# Patient Record
Sex: FEMALE | Race: WHITE | NOT HISPANIC OR LATINO | Employment: UNEMPLOYED | URBAN - NONMETROPOLITAN AREA
[De-identification: names, ages, dates, MRNs, and addresses within clinical notes are randomized per-mention and may not be internally consistent; named-entity substitution may affect disease eponyms.]

---

## 2017-06-22 PROCEDURE — 99284 EMERGENCY DEPT VISIT MOD MDM: CPT

## 2017-06-23 ENCOUNTER — APPOINTMENT (OUTPATIENT)
Dept: GENERAL RADIOLOGY | Facility: HOSPITAL | Age: 57
End: 2017-06-23

## 2017-06-23 ENCOUNTER — HOSPITAL ENCOUNTER (EMERGENCY)
Facility: HOSPITAL | Age: 57
Discharge: HOME | End: 2017-06-23
Attending: FAMILY MEDICINE | Admitting: FAMILY MEDICINE

## 2017-06-23 VITALS
HEIGHT: 60 IN | TEMPERATURE: 98.6 F | HEART RATE: 80 BPM | OXYGEN SATURATION: 94 % | BODY MASS INDEX: 43.19 KG/M2 | RESPIRATION RATE: 20 BRPM | SYSTOLIC BLOOD PRESSURE: 127 MMHG | WEIGHT: 220 LBS | DIASTOLIC BLOOD PRESSURE: 69 MMHG

## 2017-06-23 DIAGNOSIS — R19.7 DIARRHEA, UNSPECIFIED TYPE: ICD-10-CM

## 2017-06-23 DIAGNOSIS — R11.2 NON-INTRACTABLE VOMITING WITH NAUSEA, UNSPECIFIED VOMITING TYPE: Primary | ICD-10-CM

## 2017-06-23 DIAGNOSIS — E86.0 DEHYDRATION: ICD-10-CM

## 2017-06-23 DIAGNOSIS — N39.0 URINARY TRACT INFECTION, SITE UNSPECIFIED: ICD-10-CM

## 2017-06-23 LAB
ALBUMIN SERPL-MCNC: 4.6 G/DL (ref 3.4–4.8)
ALBUMIN/GLOB SERPL: 1.5 G/DL (ref 1.1–1.8)
ALP SERPL-CCNC: 71 U/L (ref 38–126)
ALT SERPL W P-5'-P-CCNC: 40 U/L (ref 9–52)
AMYLASE SERPL-CCNC: 41 U/L (ref 50–130)
ANION GAP SERPL CALCULATED.3IONS-SCNC: 18 MMOL/L (ref 5–15)
AST SERPL-CCNC: 24 U/L (ref 14–36)
BACTERIA UR QL AUTO: ABNORMAL /HPF
BASOPHILS # BLD AUTO: 0.01 10*3/MM3 (ref 0–0.2)
BASOPHILS NFR BLD AUTO: 0.1 % (ref 0–2)
BILIRUB SERPL-MCNC: 1.7 MG/DL (ref 0.2–1.3)
BILIRUB UR QL STRIP: ABNORMAL
BUN BLD-MCNC: 14 MG/DL (ref 7–21)
BUN/CREAT SERPL: 14.3 (ref 7–25)
CALCIUM SPEC-SCNC: 8.2 MG/DL (ref 8.4–10.2)
CHLORIDE SERPL-SCNC: 103 MMOL/L (ref 95–110)
CK SERPL-CCNC: 59 U/L (ref 30–135)
CLARITY UR: CLEAR
CO2 SERPL-SCNC: 21 MMOL/L (ref 22–31)
COLOR UR: YELLOW
CREAT BLD-MCNC: 0.98 MG/DL (ref 0.5–1)
DEPRECATED RDW RBC AUTO: 43.4 FL (ref 36.4–46.3)
EOSINOPHIL # BLD AUTO: 0.1 10*3/MM3 (ref 0–0.7)
EOSINOPHIL NFR BLD AUTO: 1.1 % (ref 0–7)
ERYTHROCYTE [DISTWIDTH] IN BLOOD BY AUTOMATED COUNT: 13 % (ref 11.5–14.5)
GFR SERPL CREATININE-BSD FRML MDRD: 58 ML/MIN/1.73 (ref 51–120)
GLOBULIN UR ELPH-MCNC: 3.1 GM/DL (ref 2.3–3.5)
GLUCOSE BLD-MCNC: 105 MG/DL (ref 60–100)
GLUCOSE UR STRIP-MCNC: NEGATIVE MG/DL
HCT VFR BLD AUTO: 46.5 % (ref 35–45)
HGB BLD-MCNC: 15.9 G/DL (ref 12–15.5)
HGB UR QL STRIP.AUTO: NEGATIVE
HYALINE CASTS UR QL AUTO: ABNORMAL /LPF
IMM GRANULOCYTES # BLD: 0.03 10*3/MM3 (ref 0–0.02)
IMM GRANULOCYTES NFR BLD: 0.3 % (ref 0–0.5)
KETONES UR QL STRIP: NEGATIVE
LEUKOCYTE ESTERASE UR QL STRIP.AUTO: ABNORMAL
LIPASE SERPL-CCNC: 51 U/L (ref 23–300)
LYMPHOCYTES # BLD AUTO: 2.01 10*3/MM3 (ref 0.6–4.2)
LYMPHOCYTES NFR BLD AUTO: 22.2 % (ref 10–50)
MCH RBC QN AUTO: 31.5 PG (ref 26.5–34)
MCHC RBC AUTO-ENTMCNC: 34.2 G/DL (ref 31.4–36)
MCV RBC AUTO: 92.1 FL (ref 80–98)
MONOCYTES # BLD AUTO: 0.62 10*3/MM3 (ref 0–0.9)
MONOCYTES NFR BLD AUTO: 6.8 % (ref 0–12)
NEUTROPHILS # BLD AUTO: 6.29 10*3/MM3 (ref 2–8.6)
NEUTROPHILS NFR BLD AUTO: 69.5 % (ref 37–80)
NITRITE UR QL STRIP: NEGATIVE
PH UR STRIP.AUTO: <=5 [PH] (ref 5–9)
PLATELET # BLD AUTO: 234 10*3/MM3 (ref 150–450)
PMV BLD AUTO: 10.7 FL (ref 8–12)
POTASSIUM BLD-SCNC: 3.8 MMOL/L (ref 3.5–5.1)
PROT SERPL-MCNC: 7.7 G/DL (ref 6.3–8.6)
PROT UR QL STRIP: NEGATIVE
RBC # BLD AUTO: 5.05 10*6/MM3 (ref 3.77–5.16)
RBC # UR: ABNORMAL /HPF
REF LAB TEST METHOD: ABNORMAL
SODIUM BLD-SCNC: 142 MMOL/L (ref 137–145)
SP GR UR STRIP: 1.03 (ref 1–1.03)
SQUAMOUS #/AREA URNS HPF: ABNORMAL /HPF
UROBILINOGEN UR QL STRIP: ABNORMAL
WBC NRBC COR # BLD: 9.06 10*3/MM3 (ref 3.2–9.8)
WBC UR QL AUTO: ABNORMAL /HPF

## 2017-06-23 PROCEDURE — 25010000002 KETOROLAC TROMETHAMINE PER 15 MG: Performed by: FAMILY MEDICINE

## 2017-06-23 PROCEDURE — 82150 ASSAY OF AMYLASE: CPT | Performed by: FAMILY MEDICINE

## 2017-06-23 PROCEDURE — 82550 ASSAY OF CK (CPK): CPT | Performed by: FAMILY MEDICINE

## 2017-06-23 PROCEDURE — 25010000002 ONDANSETRON PER 1 MG: Performed by: FAMILY MEDICINE

## 2017-06-23 PROCEDURE — 74022 RADEX COMPL AQT ABD SERIES: CPT

## 2017-06-23 PROCEDURE — 96374 THER/PROPH/DIAG INJ IV PUSH: CPT

## 2017-06-23 PROCEDURE — 81001 URINALYSIS AUTO W/SCOPE: CPT | Performed by: FAMILY MEDICINE

## 2017-06-23 PROCEDURE — 96361 HYDRATE IV INFUSION ADD-ON: CPT

## 2017-06-23 PROCEDURE — 85025 COMPLETE CBC W/AUTO DIFF WBC: CPT | Performed by: FAMILY MEDICINE

## 2017-06-23 PROCEDURE — 87086 URINE CULTURE/COLONY COUNT: CPT | Performed by: FAMILY MEDICINE

## 2017-06-23 PROCEDURE — 96375 TX/PRO/DX INJ NEW DRUG ADDON: CPT

## 2017-06-23 PROCEDURE — 80053 COMPREHEN METABOLIC PANEL: CPT | Performed by: FAMILY MEDICINE

## 2017-06-23 PROCEDURE — 83690 ASSAY OF LIPASE: CPT | Performed by: FAMILY MEDICINE

## 2017-06-23 RX ORDER — MELOXICAM 15 MG/1
15 TABLET ORAL DAILY
COMMUNITY
End: 2017-10-25 | Stop reason: SDUPTHER

## 2017-06-23 RX ORDER — ONDANSETRON 4 MG/1
4 TABLET, FILM COATED ORAL EVERY 8 HOURS PRN
Qty: 10 TABLET | Refills: 0 | Status: SHIPPED | OUTPATIENT
Start: 2017-06-23 | End: 2019-06-18 | Stop reason: SDUPTHER

## 2017-06-23 RX ORDER — KETOROLAC TROMETHAMINE 30 MG/ML
30 INJECTION, SOLUTION INTRAMUSCULAR; INTRAVENOUS ONCE
Status: COMPLETED | OUTPATIENT
Start: 2017-06-23 | End: 2017-06-23

## 2017-06-23 RX ORDER — CEPHALEXIN 500 MG/1
500 CAPSULE ORAL 4 TIMES DAILY
Qty: 40 CAPSULE | Refills: 0 | Status: SHIPPED | OUTPATIENT
Start: 2017-06-23 | End: 2017-09-25

## 2017-06-23 RX ORDER — ONDANSETRON 2 MG/ML
4 INJECTION INTRAMUSCULAR; INTRAVENOUS ONCE
Status: COMPLETED | OUTPATIENT
Start: 2017-06-23 | End: 2017-06-23

## 2017-06-23 RX ORDER — SODIUM CHLORIDE 9 MG/ML
125 INJECTION, SOLUTION INTRAVENOUS CONTINUOUS
Status: DISCONTINUED | OUTPATIENT
Start: 2017-06-23 | End: 2017-06-23 | Stop reason: HOSPADM

## 2017-06-23 RX ORDER — LISINOPRIL 5 MG/1
5 TABLET ORAL DAILY
COMMUNITY
End: 2017-09-25 | Stop reason: SDUPTHER

## 2017-06-23 RX ORDER — CYCLOBENZAPRINE HCL 10 MG
10 TABLET ORAL 3 TIMES DAILY PRN
COMMUNITY
End: 2017-09-25 | Stop reason: SDUPTHER

## 2017-06-23 RX ORDER — CEPHALEXIN 500 MG/1
500 CAPSULE ORAL ONCE
Status: COMPLETED | OUTPATIENT
Start: 2017-06-23 | End: 2017-06-23

## 2017-06-23 RX ORDER — LEVOTHYROXINE SODIUM 175 UG/1
175 TABLET ORAL DAILY
COMMUNITY
End: 2017-09-25 | Stop reason: SDUPTHER

## 2017-06-23 RX ADMIN — CEPHALEXIN 500 MG: 500 CAPSULE ORAL at 03:27

## 2017-06-23 RX ADMIN — KETOROLAC TROMETHAMINE 30 MG: 30 INJECTION, SOLUTION INTRAMUSCULAR; INTRAVENOUS at 01:28

## 2017-06-23 RX ADMIN — SODIUM CHLORIDE 1000 ML: 9 INJECTION, SOLUTION INTRAVENOUS at 01:27

## 2017-06-23 RX ADMIN — ONDANSETRON 4 MG: 2 INJECTION INTRAMUSCULAR; INTRAVENOUS at 01:28

## 2017-06-23 NOTE — ED TRIAGE NOTES
Pt became tearful at triage and states she lost her baby (28 yr old) not too long ago and has not been doing well since.

## 2017-06-23 NOTE — ED PROVIDER NOTES
Subjective   Patient is a 57 y.o. female presenting with vomiting.   Vomiting   The primary symptoms include fever, abdominal pain, nausea, vomiting, diarrhea, dysuria and myalgias. Primary symptoms do not include fatigue or rash. The illness began yesterday.   The dysuria is not associated with frequency or urgency.   The myalgias are not associated with weakness.   The illness is also significant for chills. The illness does not include constipation.       Review of Systems   Constitutional: Positive for chills and fever. Negative for appetite change, diaphoresis and fatigue.   HENT: Positive for ear pain. Negative for congestion, ear discharge, nosebleeds, rhinorrhea, sinus pressure, sore throat and trouble swallowing.    Eyes: Negative for discharge and redness.   Respiratory: Negative for apnea, cough, chest tightness, shortness of breath and wheezing.    Cardiovascular: Negative for chest pain.   Gastrointestinal: Positive for abdominal pain, diarrhea, nausea and vomiting. Negative for constipation.   Endocrine: Negative for polyuria.   Genitourinary: Positive for dysuria. Negative for frequency and urgency.   Musculoskeletal: Positive for myalgias. Negative for neck pain.   Skin: Negative for color change and rash.   Allergic/Immunologic: Negative for immunocompromised state.   Neurological: Negative for dizziness, seizures, syncope, weakness, light-headedness and headaches.   Hematological: Negative for adenopathy. Does not bruise/bleed easily.   Psychiatric/Behavioral: Negative for behavioral problems and confusion.   All other systems reviewed and are negative.      Past Medical History:   Diagnosis Date   • Allergy to bee sting    • Essential (primary) hypertension    • Hashimoto's thyroiditis    • Hypertensive left ventricular hypertrophy    • Osteoporosis    • Primary hyperparathyroidism     s/p parathyroidectomy      • Vitamin D deficiency        Allergies   Allergen Reactions   • Claritin [Loratadine]     • Sulfa Antibiotics        Past Surgical History:   Procedure Laterality Date   • BLADDER SURGERY  2004   • CHOLECYSTECTOMY  2002   • DILATATION AND CURETTAGE  1986   • HYSTERECTOMY  2004   • PARATHYROID GLAND SURGERY     • TUBAL ABDOMINAL LIGATION  1988       Family History   Problem Relation Age of Onset   • Diabetes Mother    • Heart disease Mother    • Heart disease Father    • Heart disease Brother    • Cancer Other    • Breast cancer Other    • Hypertension Other    • Lung cancer Other    • Lung disease Other        Social History     Social History   • Marital status:      Spouse name: N/A   • Number of children: N/A   • Years of education: N/A     Social History Main Topics   • Smoking status: Never Smoker   • Smokeless tobacco: Never Used   • Alcohol use No   • Drug use: Defer   • Sexual activity: Defer     Other Topics Concern   • None     Social History Narrative   • None           Objective   Physical Exam   Constitutional: She is oriented to person, place, and time. She appears well-developed and well-nourished.   HENT:   Head: Normocephalic and atraumatic.   Nose: Nose normal.   Mouth/Throat: Oropharynx is clear and moist. Mucous membranes are dry.   Eyes: Conjunctivae and EOM are normal. Pupils are equal, round, and reactive to light. Right eye exhibits no discharge. Left eye exhibits no discharge. No scleral icterus.   Neck: Normal range of motion. Neck supple. No tracheal deviation present.   Cardiovascular: Regular rhythm and normal heart sounds.  Tachycardia present.    No murmur heard.  Pulmonary/Chest: Effort normal and breath sounds normal. No stridor. No respiratory distress. She has no wheezes. She has no rales.   Abdominal: Soft. Bowel sounds are normal. She exhibits no distension and no mass. There is tenderness in the suprapubic area. There is CVA tenderness (right). There is no rebound and no guarding.   Musculoskeletal: She exhibits no edema.   Neurological: She is alert and  oriented to person, place, and time. Coordination normal.   Skin: Skin is warm and dry. No rash noted. No erythema.   Psychiatric: She has a normal mood and affect. Her behavior is normal. Thought content normal.   Nursing note and vitals reviewed.      Procedures         ED Course  ED Course          Labs Reviewed   COMPREHENSIVE METABOLIC PANEL - Abnormal; Notable for the following:        Result Value    Glucose 105 (*)     CO2 21.0 (*)     Calcium 8.2 (*)     Total Bilirubin 1.7 (*)     Anion Gap 18.0 (*)     All other components within normal limits   URINALYSIS W/ CULTURE IF INDICATED - Abnormal; Notable for the following:     Bilirubin, UA Small (1+) (*)     Leuk Esterase, UA Trace (*)     All other components within normal limits   AMYLASE - Abnormal; Notable for the following:     Amylase 41 (*)     All other components within normal limits   CBC WITH AUTO DIFFERENTIAL - Abnormal; Notable for the following:     Hemoglobin 15.9 (*)     Hematocrit 46.5 (*)     Immature Grans, Absolute 0.03 (*)     All other components within normal limits   URINALYSIS, MICROSCOPIC ONLY - Abnormal; Notable for the following:     RBC, UA 0-2 (*)     WBC, UA 6-12 (*)     Bacteria, UA 1+ (*)     Squamous Epithelial Cells, UA 3-5 (*)     All other components within normal limits   CK - Normal   LIPASE - Normal   URINE CULTURE   CBC AND DIFFERENTIAL    Narrative:     The following orders were created for panel order CBC & Differential.  Procedure                               Abnormality         Status                     ---------                               -----------         ------                     CBC Auto Differential[[id number]]        Abnormal            Final result                 Please view results for these tests on the individual orders.       XR Abdomen 2 View With Chest 1 View   Final Result   1. No acute pulmonary disease.   2. Nonspecific abdomen.      Electronically signed by:  Miguel Angel Card  6/23/2017  2:11 AM   CDT Workstation: [id number]                    [facility] [place]    Final diagnoses:   Non-intractable vomiting with nausea, unspecified vomiting type   Diarrhea, unspecified type   Urinary tract infection, site unspecified   Dehydration            Efrain Schultz MD  06/23/17 0056       Efrain Schultz MD  06/23/17 0257

## 2017-06-24 LAB — BACTERIA SPEC AEROBE CULT: NORMAL

## 2017-06-29 ENCOUNTER — TELEPHONE (OUTPATIENT)
Dept: FAMILY MEDICINE CLINIC | Facility: CLINIC | Age: 57
End: 2017-06-29

## 2017-06-29 NOTE — TELEPHONE ENCOUNTER
----- Message from Missy Amaroele Tacho sent at 6/27/2017  2:56 PM CDT -----  Regarding: ER F/U-APPT DECLINED  Contact: [phone]  PT SAYS SHE IS FEELING MUCH BETTER, NO APPT NECESSARY.

## 2017-08-19 ENCOUNTER — HOSPITAL ENCOUNTER (OUTPATIENT)
Dept: CT IMAGING | Facility: HOSPITAL | Age: 57
Discharge: HOME | End: 2017-08-19
Admitting: FAMILY MEDICINE

## 2017-08-19 PROCEDURE — 83690 ASSAY OF LIPASE: CPT | Performed by: FAMILY MEDICINE

## 2017-08-19 PROCEDURE — 80053 COMPREHEN METABOLIC PANEL: CPT | Performed by: FAMILY MEDICINE

## 2017-08-19 PROCEDURE — 85025 COMPLETE CBC W/AUTO DIFF WBC: CPT | Performed by: FAMILY MEDICINE

## 2017-08-19 PROCEDURE — 0 IOPAMIDOL 61 % SOLUTION: Performed by: FAMILY MEDICINE

## 2017-08-19 PROCEDURE — 82150 ASSAY OF AMYLASE: CPT | Performed by: FAMILY MEDICINE

## 2017-08-19 PROCEDURE — 74177 CT ABD & PELVIS W/CONTRAST: CPT

## 2017-08-19 RX ADMIN — IOPAMIDOL 95 ML: 612 INJECTION, SOLUTION INTRAVENOUS at 14:14

## 2017-09-25 ENCOUNTER — OFFICE VISIT (OUTPATIENT)
Dept: FAMILY MEDICINE CLINIC | Facility: CLINIC | Age: 57
End: 2017-09-25

## 2017-09-25 VITALS
BODY MASS INDEX: 44.57 KG/M2 | DIASTOLIC BLOOD PRESSURE: 110 MMHG | OXYGEN SATURATION: 98 % | SYSTOLIC BLOOD PRESSURE: 140 MMHG | WEIGHT: 227 LBS | HEART RATE: 112 BPM | HEIGHT: 60 IN

## 2017-09-25 DIAGNOSIS — I10 ESSENTIAL HYPERTENSION: ICD-10-CM

## 2017-09-25 DIAGNOSIS — E03.8 HYPOTHYROIDISM DUE TO HASHIMOTO'S THYROIDITIS: ICD-10-CM

## 2017-09-25 DIAGNOSIS — F43.21 GRIEF REACTION: ICD-10-CM
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
## 2017-09-25 DIAGNOSIS — E06.3 HYPOTHYROIDISM DUE TO HASHIMOTO'S THYROIDITIS: ICD-10-CM

## 2017-09-25 DIAGNOSIS — M25.532 LEFT WRIST PAIN: Primary | ICD-10-CM
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
## 2017-09-25 PROCEDURE — 99213 OFFICE O/P EST LOW 20 MIN: CPT | Performed by: FAMILY MEDICINE

## 2017-09-25 RX ORDER — CYCLOBENZAPRINE HCL 10 MG
10 TABLET ORAL 3 TIMES DAILY PRN
Qty: 30 TABLET | Refills: 2 | Status: SHIPPED | OUTPATIENT
Start: 2017-09-25 | End: 2018-08-20 | Stop reason: SDUPTHER

## 2017-09-25 RX ORDER — LEVOTHYROXINE SODIUM 175 UG/1
175 TABLET ORAL DAILY
Qty: 30 TABLET | Refills: 2 | Status: SHIPPED | OUTPATIENT
Start: 2017-09-25 | End: 2017-12-07 | Stop reason: SDUPTHER

## 2017-09-25 RX ORDER — LISINOPRIL 5 MG/1
5 TABLET ORAL DAILY
Qty: 30 TABLET | Refills: 2 | Status: SHIPPED | OUTPATIENT
Start: 2017-09-25 | End: 2017-12-07

## 2017-09-25 NOTE — PROGRESS NOTES
Subjective   Argenis Blair is a 57 y.o. female. She complains of left wrist pain after a fall 10 days ago. She was seen at the ED in [place]. She was told she had no fracture at that time but that she needed a repeat xray in 10 days. She has been taking her Zestril irregularly due to concerns about taking too much medicine. She has not taken her Synthroid in several months and needs a refill. She is tearful when describing her son's gunshot wound related death.     Arm Pain    The incident occurred more than 1 week ago. The incident occurred at home. The injury mechanism was a fall. The pain is present in the left forearm and left wrist. The pain does not radiate. The pain is at a severity of 6/10. The pain is moderate. The pain has been constant since the incident. Pertinent negatives include no chest pain, muscle weakness, numbness or tingling. Nothing aggravates the symptoms. She has tried NSAIDs for the symptoms. The treatment provided mild relief.   Hypothyroidism   This is a chronic problem. The current episode started more than 1 year ago. The problem occurs constantly. The problem has been unchanged. Associated symptoms include arthralgias, joint swelling and myalgias. Pertinent negatives include no abdominal pain, chest pain, coughing, fatigue, fever, headaches, nausea, numbness, rash, sore throat or weakness. Nothing aggravates the symptoms. The treatment provided mild relief.        The following portions of the patient's history were reviewed and updated as appropriate: allergies, current medications, past family history, past medical history, past social history, past surgical history and problem list.    Review of Systems   Constitutional: Negative for activity change, appetite change, fatigue and fever.   HENT: Negative for ear pain and sore throat.    Eyes: Negative for pain and visual disturbance.   Respiratory: Negative for cough and shortness of breath.    Cardiovascular: Negative for chest  pain and palpitations.   Gastrointestinal: Negative for abdominal pain and nausea.   Endocrine: Negative for cold intolerance and heat intolerance.   Genitourinary: Negative for difficulty urinating and dysuria.   Musculoskeletal: Positive for arthralgias, joint swelling and myalgias. Negative for gait problem.   Skin: Negative for color change and rash.   Neurological: Negative for dizziness, tingling, weakness, numbness and headaches.   Hematological: Negative for adenopathy. Does not bruise/bleed easily.   Psychiatric/Behavioral: Negative for agitation, confusion and sleep disturbance.       Objective   Physical Exam   Constitutional: She is oriented to person, place, and time. She appears well-developed and well-nourished.   HENT:   Head: Normocephalic and atraumatic.   Right Ear: External ear normal.   Left Ear: External ear normal.   Nose: Nose normal.   Mouth/Throat: Oropharynx is clear and moist.   Eyes: Conjunctivae and EOM are normal.   Neck: Normal range of motion. Neck supple.   Cardiovascular: Normal rate, regular rhythm and normal heart sounds.    Pulmonary/Chest: Effort normal and breath sounds normal.   Abdominal: Soft. Bowel sounds are normal. She exhibits no distension. There is no tenderness.   Musculoskeletal:        Left wrist: She exhibits decreased range of motion, tenderness and swelling.        Arms:  Immobilized and splinted left wrist and hand. Fingers are neurovascularly intact.   Neurological: She is alert and oriented to person, place, and time.   Skin: Skin is warm and dry.   Psychiatric: Her behavior is normal. Her mood appears anxious. Her speech is rapid and/or pressured. Cognition and memory are normal.       Assessment/Plan   Argenis was seen today for establish care, wrist pain and leg pain.    Diagnoses and all orders for this visit:    Left wrist pain  -     XR Forearm 2 View Left (In Office)    Hypothyroidism due to Hashimoto's thyroiditis  -     levothyroxine (SYNTHROID,  LEVOTHROID) 175 MCG tablet; Take 1 tablet by mouth Daily.    Fall, subsequent encounter  -     cyclobenzaprine (FLEXERIL) 10 MG tablet; Take 1 tablet by mouth 3 (Three) Times a Day As Needed for Muscle Spasms.    Essential hypertension  -     lisinopril (PRINIVIL,ZESTRIL) 5 MG tablet; Take 1 tablet by mouth Daily.    Grief reaction      Patient declined counseling and/or antidepressant therapy for grief related to son's death.    Will call with xray results.    Follow up in one month to recheck and monitor and check T4 and TSH.          This document has been electronically signed by Enoc Lange DO on September 25, 2017 3:53 PM

## 2017-09-28 ENCOUNTER — TELEPHONE (OUTPATIENT)
Dept: FAMILY MEDICINE CLINIC | Facility: CLINIC | Age: 57
End: 2017-09-28

## 2017-09-29 DIAGNOSIS — M25.532 LEFT WRIST PAIN: Primary | ICD-10-CM

## 2017-09-29 NOTE — TELEPHONE ENCOUNTER
MRI left wrist ordered to evaluate continued left wrist pain after fall. Plain films are normal.  Please coordinate with patient.    Thanks.    JEFF

## 2017-10-17 ENCOUNTER — APPOINTMENT (OUTPATIENT)
Dept: MRI IMAGING | Facility: HOSPITAL | Age: 57
End: 2017-10-17
Attending: FAMILY MEDICINE

## 2017-10-25 ENCOUNTER — LAB (OUTPATIENT)
Dept: LAB | Facility: HOSPITAL | Age: 57
End: 2017-10-25

## 2017-10-25 ENCOUNTER — OFFICE VISIT (OUTPATIENT)
Dept: FAMILY MEDICINE CLINIC | Facility: CLINIC | Age: 57
End: 2017-10-25

## 2017-10-25 VITALS
DIASTOLIC BLOOD PRESSURE: 90 MMHG | OXYGEN SATURATION: 99 % | WEIGHT: 222 LBS | HEIGHT: 60 IN | SYSTOLIC BLOOD PRESSURE: 142 MMHG | BODY MASS INDEX: 43.59 KG/M2 | HEART RATE: 104 BPM

## 2017-10-25 DIAGNOSIS — E03.8 HYPOTHYROIDISM DUE TO HASHIMOTO'S THYROIDITIS: ICD-10-CM

## 2017-10-25 DIAGNOSIS — K92.1 HEMATOCHEZIA: ICD-10-CM

## 2017-10-25 DIAGNOSIS — K21.00 REFLUX ESOPHAGITIS: ICD-10-CM

## 2017-10-25 DIAGNOSIS — B00.1 FEVER BLISTER: ICD-10-CM

## 2017-10-25 DIAGNOSIS — E06.3 HYPOTHYROIDISM DUE TO HASHIMOTO'S THYROIDITIS: ICD-10-CM

## 2017-10-25 DIAGNOSIS — K29.00 ACUTE SUPERFICIAL GASTRITIS WITHOUT HEMORRHAGE: ICD-10-CM

## 2017-10-25 DIAGNOSIS — F32.9 REACTIVE DEPRESSION: ICD-10-CM

## 2017-10-25 DIAGNOSIS — M25.532 LEFT WRIST PAIN: Primary | ICD-10-CM

## 2017-10-25 PROBLEM — K29.70 GASTRITIS: Status: ACTIVE | Noted: 2017-10-25

## 2017-10-25 PROBLEM — I10 ESSENTIAL HYPERTENSION: Status: RESOLVED | Noted: 2017-09-25 | Resolved: 2017-10-25

## 2017-10-25 PROBLEM — E03.9 ACQUIRED HYPOTHYROIDISM: Status: ACTIVE | Noted: 2017-10-25

## 2017-10-25 PROBLEM — K59.1 FUNCTIONAL DIARRHEA: Status: ACTIVE | Noted: 2017-10-25

## 2017-10-25 LAB
T4 FREE SERPL-MCNC: 1.65 NG/DL (ref 0.78–2.19)
TSH SERPL DL<=0.05 MIU/L-ACNC: 0.48 MIU/ML (ref 0.46–4.68)

## 2017-10-25 PROCEDURE — 36415 COLL VENOUS BLD VENIPUNCTURE: CPT

## 2017-10-25 PROCEDURE — 84443 ASSAY THYROID STIM HORMONE: CPT | Performed by: FAMILY MEDICINE

## 2017-10-25 PROCEDURE — 99214 OFFICE O/P EST MOD 30 MIN: CPT | Performed by: FAMILY MEDICINE

## 2017-10-25 PROCEDURE — 84439 ASSAY OF FREE THYROXINE: CPT | Performed by: FAMILY MEDICINE

## 2017-10-25 RX ORDER — MIRTAZAPINE 15 MG/1
15 TABLET, ORALLY DISINTEGRATING ORAL NIGHTLY
Qty: 30 TABLET | Refills: 2 | Status: SHIPPED | OUTPATIENT
Start: 2017-10-25 | End: 2017-11-09

## 2017-10-25 RX ORDER — MELOXICAM 15 MG/1
15 TABLET ORAL DAILY
Qty: 30 TABLET | Refills: 5 | Status: SHIPPED | OUTPATIENT
Start: 2017-10-25 | End: 2017-10-25 | Stop reason: SDUPTHER

## 2017-10-25 RX ORDER — PANTOPRAZOLE SODIUM 40 MG/1
40 TABLET, DELAYED RELEASE ORAL DAILY
Qty: 30 TABLET | Refills: 2 | Status: SHIPPED | OUTPATIENT
Start: 2017-10-25 | End: 2018-01-25

## 2017-10-25 RX ORDER — FAMCICLOVIR 500 MG/1
500 TABLET ORAL 3 TIMES DAILY
Qty: 21 TABLET | Refills: 2 | Status: SHIPPED | OUTPATIENT
Start: 2017-10-25 | End: 2019-02-14 | Stop reason: SDUPTHER

## 2017-10-25 RX ORDER — MELOXICAM 15 MG/1
15 TABLET ORAL DAILY
Qty: 30 TABLET | Refills: 5 | Status: SHIPPED | OUTPATIENT
Start: 2017-10-25 | End: 2018-11-12 | Stop reason: SDUPTHER

## 2017-10-25 NOTE — PATIENT INSTRUCTIONS
Major Depressive Disorder  Major depressive disorder is a mental illness. It also may be called clinical depression or unipolar depression. Major depressive disorder usually causes feelings of sadness, hopelessness, or helplessness. Some people with this disorder do not feel particularly sad but lose interest in doing things they used to enjoy (anhedonia). Major depressive disorder also can cause physical symptoms. It can interfere with work, school, relationships, and other normal everyday activities. The disorder varies in severity but is longer lasting and more serious than the sadness we all feel from time to time in our lives.  Major depressive disorder often is triggered by stressful life events or major life changes. Examples of these triggers include divorce, loss of your job or home, a move, and the death of a family member or close friend. Sometimes this disorder occurs for no obvious reason at all. People who have family members with major depressive disorder or bipolar disorder are at higher risk for developing this disorder, with or without life stressors. Major depressive disorder can occur at any age. It may occur just once in your life (single episode major depressive disorder). It may occur multiple times (recurrent major depressive disorder).  SYMPTOMS  People with major depressive disorder have either anhedonia or depressed mood on nearly a daily basis for at least 2 weeks or longer. Symptoms of depressed mood include:  · Feelings of sadness (blue or down in the dumps) or emptiness.  · Feelings of hopelessness or helplessness.  · Tearfulness or episodes of crying (may be observed by others).  · Irritability (children and adolescents).  In addition to depressed mood or anhedonia or both, people with this disorder have at least four of the following symptoms:  · Difficulty sleeping or sleeping too much.    · Significant change (increase or decrease) in appetite or weight.    · Lack of energy or  motivation.  · Feelings of guilt and worthlessness.    · Difficulty concentrating, remembering, or making decisions.  · Unusually slow movement (psychomotor retardation) or restlessness (as observed by others).    · Recurrent wishes for death, recurrent thoughts of self-harm (suicide), or a suicide attempt.  People with major depressive disorder commonly have persistent negative thoughts about themselves, other people, and the world. People with severe major depressive disorder may experience distorted beliefs or perceptions about the world (psychotic delusions). They also may see or hear things that are not real (psychotic hallucinations).  DIAGNOSIS  Major depressive disorder is diagnosed through an assessment by your health care provider. Your health care provider will ask about aspects of your daily life, such as mood, sleep, and appetite, to see if you have the diagnostic symptoms of major depressive disorder. Your health care provider may ask about your medical history and use of alcohol or drugs, including prescription medicines. Your health care provider also may do a physical exam and blood work. This is because certain medical conditions and the use of certain substances can cause major depressive disorder-like symptoms (secondary depression). Your health care provider also may refer you to a mental health specialist for further evaluation and treatment.  TREATMENT  It is important to recognize the symptoms of major depressive disorder and seek treatment. The following treatments can be prescribed for this disorder:    · Medicine. Antidepressant medicines usually are prescribed. Antidepressant medicines are thought to correct chemical imbalances in the brain that are commonly associated with major depressive disorder. Other types of medicine may be added if the symptoms do not respond to antidepressant medicines alone or if psychotic delusions or hallucinations occur.  · Talk therapy. Talk therapy can be  helpful in treating major depressive disorder by providing support, education, and guidance. Certain types of talk therapy also can help with negative thinking (cognitive behavioral therapy) and with relationship issues that trigger this disorder (interpersonal therapy).  A mental health specialist can help determine which treatment is best for you. Most people with major depressive disorder do well with a combination of medicine and talk therapy. Treatments involving electrical stimulation of the brain can be used in situations with extremely severe symptoms or when medicine and talk therapy do not work over time. These treatments include electroconvulsive therapy, transcranial magnetic stimulation, and vagal nerve stimulation.     This information is not intended to replace advice given to you by your health care provider. Make sure you discuss any questions you have with your health care provider.     Document Released: 04/14/2014 Document Revised: 01/08/2016 Document Reviewed: 04/14/2014  [employer] Interactive Patient Education ©2017 [employer]

## 2017-10-25 NOTE — PROGRESS NOTES
Subjective   Argenis Blair is a 57 y.o. female. She continues to have left wrist pain and has a fever blister. She suffers from hypothyroidism and needs labs. She has multiple psychosocial problems with deaths in the family. She is taking care of her grandchild. Her son  in a gun related accident and there is some confusion as to whether it was an accident or homicide. The matter is under investigation. She reports hematochezia and wants to see GI. She has a fever blister on her upper lip and complains of heartburn as well.  Depression   Visit Type: initial  Onset of symptoms: more than 1 year ago  Patient presents with the following symptoms: anhedonia, decreased concentration, depressed mood, fatigue, feelings of hopelessness, feelings of worthlessness, insomnia and nervousness/anxiety.  Patient is not experiencing: confusion, palpitations, shortness of breath, suicidal ideas, suicidal planning, thoughts of death and weight gain.  Severity: moderate   Aggravated by: family issues  Sleep quality: poor  Nighttime awakenings: none  Risk factors: family history and major life event  Patient has a history of: depression    Wrist Pain    The pain is present in the left wrist. This is a chronic problem. The current episode started more than 1 month ago. There has been a history of trauma. The problem occurs intermittently. The problem has been gradually improving. The pain is at a severity of 5/10. The pain is moderate. Associated symptoms include a limited range of motion. Pertinent negatives include no fever. The symptoms are aggravated by activity. She has tried NSAIDS for the symptoms. The treatment provided moderate relief.   GI Problem   The primary symptoms include fatigue, abdominal pain, hematochezia, myalgias and arthralgias. Primary symptoms do not include fever, nausea, dysuria or rash. The illness began more than 7 days ago. The problem has not changed since onset.  The arthralgias are associated with  limited range of motion.     The myalgias are not associated with weakness.   Significant associated medical issues include GERD.   Heartburn   She complains of abdominal pain. She reports no chest pain, no coughing, no nausea or no sore throat. This is a new problem. The current episode started 1 to 4 weeks ago. The problem occurs frequently. The problem has been unchanged. Nothing aggravates the symptoms. Associated symptoms include fatigue. There are no known risk factors. She has tried nothing for the symptoms. The treatment provided no relief.        The following portions of the patient's history were reviewed and updated as appropriate: allergies, current medications, past family history, past medical history, past social history, past surgical history and problem list.    Review of Systems   Constitutional: Positive for activity change, appetite change and fatigue. Negative for fever and weight gain.   HENT: Negative for ear pain and sore throat.    Eyes: Negative for pain and visual disturbance.   Respiratory: Negative for cough and shortness of breath.    Cardiovascular: Negative for chest pain and palpitations.   Gastrointestinal: Positive for abdominal pain and hematochezia. Negative for nausea.        Hematochezia   Endocrine: Negative for cold intolerance and heat intolerance.   Genitourinary: Negative for difficulty urinating and dysuria.   Musculoskeletal: Positive for arthralgias and myalgias. Negative for gait problem.   Skin: Negative for color change and rash.   Neurological: Negative for dizziness, weakness and headaches.   Hematological: Negative for adenopathy. Does not bruise/bleed easily.   Psychiatric/Behavioral: Positive for decreased concentration, dysphoric mood and sleep disturbance. Negative for agitation, confusion, self-injury and suicidal ideas. The patient is nervous/anxious and has insomnia.        Objective   Physical Exam   Constitutional: She is oriented to person, place, and  time. She appears well-developed and well-nourished.   HENT:   Head: Normocephalic and atraumatic.       Right Ear: External ear normal.   Left Ear: External ear normal.   Nose: Nose normal.   Mouth/Throat: Oropharynx is clear and moist.   Fever blister to right upper lip.   Eyes: Conjunctivae and EOM are normal.   Neck: Normal range of motion. Neck supple.   Cardiovascular: Normal rate, regular rhythm and normal heart sounds.    Pulmonary/Chest: Effort normal and breath sounds normal.   Abdominal: Soft. Bowel sounds are normal. She exhibits no distension. There is no tenderness.   Musculoskeletal:        Left wrist: She exhibits decreased range of motion, tenderness and bony tenderness. She exhibits no swelling, no effusion, no crepitus, no deformity and no laceration.   Neurological: She is alert and oriented to person, place, and time.   Skin: Skin is warm and dry.   Psychiatric: Judgment and thought content normal. Her mood appears anxious. Her speech is rapid and/or pressured. She is agitated. Cognition and memory are normal.     PROCEDURE: XR FOREARM 2 VW LEFT     VIEWS: 2     INDICATION: Pain     COMPARISON: None     FINDINGS:     - fracture: None    - alignment: Within normal limits    - misc: No significant soft tissue abnormality identified     IMPRESSION:  CONCLUSION:  Negative examination for age.       Assessment/Plan   Argenis was seen today for shoulder pain, hypertension and med refill.    Diagnoses and all orders for this visit:    Left wrist pain  -     meloxicam (MOBIC) 15 MG tablet; Take 1 tablet by mouth Daily.    Fever blister  -     famciclovir (FAMVIR) 500 MG tablet; Take 1 tablet by mouth 3 (Three) Times a Day.    Hypothyroidism due to Hashimoto's thyroiditis  -     TSH; Future  -     T4, free; Future    Reactive depression  -     mirtazapine (REMERON SOLTAB) 15 MG disintegrating tablet; Take 1 tablet by mouth Every Night.    Reflux esophagitis  -     pantoprazole (PROTONIX) 40 MG EC tablet;  Take 1 tablet by mouth Daily.  -     POCT FECAL OCCULT BLOOD BY IMMUNOASSAY    Acute superficial gastritis without hemorrhage    Hematochezia  -     Ambulatory Referral to Gastroenterology    Other orders  -     Discontinue: meloxicam (MOBIC) 15 MG tablet; Take 1 tablet by mouth Daily.      Follow up in 2 weeks to address other medical issues.          This document has been electronically signed by Enoc Lange DO on October 26, 2017 10:15 AM

## 2017-10-26 ENCOUNTER — RESULTS ENCOUNTER (OUTPATIENT)
Dept: FAMILY MEDICINE CLINIC | Facility: CLINIC | Age: 57
End: 2017-10-26

## 2017-10-26 DIAGNOSIS — K92.1 HEMATOCHEZIA: ICD-10-CM

## 2017-11-09 ENCOUNTER — OFFICE VISIT (OUTPATIENT)
Dept: FAMILY MEDICINE CLINIC | Facility: CLINIC | Age: 57
End: 2017-11-09

## 2017-11-09 VITALS
WEIGHT: 219 LBS | HEIGHT: 60 IN | DIASTOLIC BLOOD PRESSURE: 80 MMHG | HEART RATE: 120 BPM | SYSTOLIC BLOOD PRESSURE: 122 MMHG | BODY MASS INDEX: 43 KG/M2 | OXYGEN SATURATION: 98 %

## 2017-11-09 DIAGNOSIS — I10 ESSENTIAL HYPERTENSION: ICD-10-CM

## 2017-11-09 DIAGNOSIS — F43.21 GRIEF REACTION: ICD-10-CM

## 2017-11-09 DIAGNOSIS — F32.9 REACTIVE DEPRESSION: Primary | ICD-10-CM

## 2017-11-09 PROBLEM — F51.01 PRIMARY INSOMNIA: Status: ACTIVE | Noted: 2017-11-09

## 2017-11-09 PROCEDURE — 90472 IMMUNIZATION ADMIN EACH ADD: CPT | Performed by: FAMILY MEDICINE

## 2017-11-09 PROCEDURE — 99213 OFFICE O/P EST LOW 20 MIN: CPT | Performed by: FAMILY MEDICINE

## 2017-11-09 PROCEDURE — 90715 TDAP VACCINE 7 YRS/> IM: CPT | Performed by: FAMILY MEDICINE

## 2017-11-09 PROCEDURE — 90686 IIV4 VACC NO PRSV 0.5 ML IM: CPT | Performed by: FAMILY MEDICINE

## 2017-11-09 PROCEDURE — 90471 IMMUNIZATION ADMIN: CPT | Performed by: FAMILY MEDICINE

## 2017-11-09 RX ORDER — BUPROPION HYDROCHLORIDE 150 MG/1
150 TABLET, EXTENDED RELEASE ORAL 2 TIMES DAILY
Qty: 60 TABLET | Refills: 2 | Status: SHIPPED | OUTPATIENT
Start: 2017-11-09 | End: 2017-12-07 | Stop reason: SDUPTHER

## 2017-11-09 NOTE — PATIENT INSTRUCTIONS
Major Depressive Disorder  Major depressive disorder is a mental illness. It also may be called clinical depression or unipolar depression. Major depressive disorder usually causes feelings of sadness, hopelessness, or helplessness. Some people with this disorder do not feel particularly sad but lose interest in doing things they used to enjoy (anhedonia). Major depressive disorder also can cause physical symptoms. It can interfere with work, school, relationships, and other normal everyday activities. The disorder varies in severity but is longer lasting and more serious than the sadness we all feel from time to time in our lives.  Major depressive disorder often is triggered by stressful life events or major life changes. Examples of these triggers include divorce, loss of your job or home, a move, and the death of a family member or close friend. Sometimes this disorder occurs for no obvious reason at all. People who have family members with major depressive disorder or bipolar disorder are at higher risk for developing this disorder, with or without life stressors. Major depressive disorder can occur at any age. It may occur just once in your life (single episode major depressive disorder). It may occur multiple times (recurrent major depressive disorder).  SYMPTOMS  People with major depressive disorder have either anhedonia or depressed mood on nearly a daily basis for at least 2 weeks or longer. Symptoms of depressed mood include:  · Feelings of sadness (blue or down in the dumps) or emptiness.  · Feelings of hopelessness or helplessness.  · Tearfulness or episodes of crying (may be observed by others).  · Irritability (children and adolescents).  In addition to depressed mood or anhedonia or both, people with this disorder have at least four of the following symptoms:  · Difficulty sleeping or sleeping too much.    · Significant change (increase or decrease) in appetite or weight.    · Lack of energy or  motivation.  · Feelings of guilt and worthlessness.    · Difficulty concentrating, remembering, or making decisions.  · Unusually slow movement (psychomotor retardation) or restlessness (as observed by others).    · Recurrent wishes for death, recurrent thoughts of self-harm (suicide), or a suicide attempt.  People with major depressive disorder commonly have persistent negative thoughts about themselves, other people, and the world. People with severe major depressive disorder may experience distorted beliefs or perceptions about the world (psychotic delusions). They also may see or hear things that are not real (psychotic hallucinations).  DIAGNOSIS  Major depressive disorder is diagnosed through an assessment by your health care provider. Your health care provider will ask about aspects of your daily life, such as mood, sleep, and appetite, to see if you have the diagnostic symptoms of major depressive disorder. Your health care provider may ask about your medical history and use of alcohol or drugs, including prescription medicines. Your health care provider also may do a physical exam and blood work. This is because certain medical conditions and the use of certain substances can cause major depressive disorder-like symptoms (secondary depression). Your health care provider also may refer you to a mental health specialist for further evaluation and treatment.  TREATMENT  It is important to recognize the symptoms of major depressive disorder and seek treatment. The following treatments can be prescribed for this disorder:    · Medicine. Antidepressant medicines usually are prescribed. Antidepressant medicines are thought to correct chemical imbalances in the brain that are commonly associated with major depressive disorder. Other types of medicine may be added if the symptoms do not respond to antidepressant medicines alone or if psychotic delusions or hallucinations occur.  · Talk therapy. Talk therapy can be  helpful in treating major depressive disorder by providing support, education, and guidance. Certain types of talk therapy also can help with negative thinking (cognitive behavioral therapy) and with relationship issues that trigger this disorder (interpersonal therapy).  A mental health specialist can help determine which treatment is best for you. Most people with major depressive disorder do well with a combination of medicine and talk therapy. Treatments involving electrical stimulation of the brain can be used in situations with extremely severe symptoms or when medicine and talk therapy do not work over time. These treatments include electroconvulsive therapy, transcranial magnetic stimulation, and vagal nerve stimulation.     This information is not intended to replace advice given to you by your health care provider. Make sure you discuss any questions you have with your health care provider.     Document Released: 04/14/2014 Document Revised: 01/08/2016 Document Reviewed: 04/14/2014  [employer] Interactive Patient Education ©2017 [employer]

## 2017-11-09 NOTE — PROGRESS NOTES
Subjective   Argenis Blair is a 57 y.o. female. She is here for a follow up for depression. She could not tolerate Remeron because it made her feel bad.    Depression   Visit Type: follow-up  Patient presents with the following symptoms: anhedonia, decreased concentration, depressed mood, excessive worry, feelings of hopelessness, insomnia, nervousness/anxiety and restlessness.  Patient is not experiencing: confusion, palpitations, shortness of breath, suicidal ideas, suicidal planning, thoughts of death and weight gain.  Frequency of symptoms: constantly   Severity: severe   Sleep quality: poor  Nighttime awakenings: several  Compliance with medications:  51-75%  Side effects:  Dry mouth       The following portions of the patient's history were reviewed and updated as appropriate: allergies, current medications, past family history, past medical history, past social history, past surgical history and problem list.    Review of Systems   Constitutional: Negative for activity change, appetite change, fatigue, fever and weight gain.   HENT: Negative for ear pain and sore throat.    Eyes: Negative for pain and visual disturbance.   Respiratory: Negative for cough and shortness of breath.    Cardiovascular: Negative for chest pain and palpitations.   Gastrointestinal: Negative for abdominal pain and nausea.   Endocrine: Negative for cold intolerance and heat intolerance.   Genitourinary: Negative for difficulty urinating and dysuria.   Musculoskeletal: Negative for arthralgias and gait problem.   Skin: Negative for color change and rash.   Neurological: Negative for dizziness, weakness and headaches.   Hematological: Negative for adenopathy. Does not bruise/bleed easily.   Psychiatric/Behavioral: Positive for agitation, decreased concentration, dysphoric mood and sleep disturbance. Negative for confusion, hallucinations, self-injury and suicidal ideas. The patient is nervous/anxious and has insomnia. The patient is  not hyperactive.        Objective   Physical Exam   Constitutional: She is oriented to person, place, and time. She appears well-developed and well-nourished.   HENT:   Head: Normocephalic and atraumatic.   Right Ear: External ear normal.   Left Ear: External ear normal.   Nose: Nose normal.   Mouth/Throat: Oropharynx is clear and moist.   Eyes: Conjunctivae and EOM are normal.   Neck: Normal range of motion. Neck supple.   Cardiovascular: Normal rate, regular rhythm and normal heart sounds.    Pulmonary/Chest: Effort normal and breath sounds normal.   Abdominal: Soft. Bowel sounds are normal. She exhibits no distension. There is no tenderness.   Musculoskeletal: Normal range of motion.   Neurological: She is alert and oriented to person, place, and time.   Skin: Skin is warm and dry.   Psychiatric: Her mood appears anxious. Her speech is rapid and/or pressured. She is agitated. She is not withdrawn. Thought content is not delusional. Cognition and memory are normal. Cognition and memory are not impaired. She does not express impulsivity. She expresses no homicidal and no suicidal ideation. She expresses no suicidal plans and no homicidal plans. She exhibits normal recent memory and normal remote memory. She is inattentive.       Assessment/Plan   Argenis was seen today for fibromyalgia and wrist pain.    Diagnoses and all orders for this visit:    Reactive depression  -     buPROPion SR (WELLBUTRIN SR) 150 MG 12 hr tablet; Take 1 tablet by mouth 2 (Two) Times a Day.    Grief reaction    Essential hypertension    Other orders  -     Tdap Vaccine Greater Than or Equal To 8yo IM  -     Flu Vaccine Quad PF 3YR+ (FLUARIX [id number])    Patient declined my efforts to refer her to behavioral health for grief counseling    Discontinue Remeron.    Start Wellbutrin 150 mg po twice daily    Follow up in 1 month          This document has been electronically signed by Enoc Lange DO on November 9, 2017 4:40 PM

## 2017-11-30 ENCOUNTER — OFFICE VISIT (OUTPATIENT)
Dept: GASTROENTEROLOGY | Facility: CLINIC | Age: 57
End: 2017-11-30

## 2017-11-30 VITALS
DIASTOLIC BLOOD PRESSURE: 88 MMHG | SYSTOLIC BLOOD PRESSURE: 136 MMHG | WEIGHT: 223.8 LBS | HEIGHT: 60 IN | BODY MASS INDEX: 43.94 KG/M2 | HEART RATE: 106 BPM

## 2017-11-30 DIAGNOSIS — R19.4 CHANGE IN BOWEL HABITS: ICD-10-CM

## 2017-11-30 DIAGNOSIS — K21.9 GASTROESOPHAGEAL REFLUX DISEASE, ESOPHAGITIS PRESENCE NOT SPECIFIED: ICD-10-CM

## 2017-11-30 DIAGNOSIS — R11.0 NAUSEA: Primary | ICD-10-CM

## 2017-11-30 DIAGNOSIS — K59.00 CONSTIPATION, UNSPECIFIED CONSTIPATION TYPE: ICD-10-CM

## 2017-11-30 DIAGNOSIS — K62.5 HEMORRHAGE OF ANUS AND RECTUM: ICD-10-CM

## 2017-11-30 PROCEDURE — 99214 OFFICE O/P EST MOD 30 MIN: CPT | Performed by: PHYSICIAN ASSISTANT

## 2017-11-30 RX ORDER — SODIUM, POTASSIUM,MAG SULFATES 17.5-3.13G
1 SOLUTION, RECONSTITUTED, ORAL ORAL ONCE
Qty: 1 BOTTLE | Refills: 0 | Status: SHIPPED | OUTPATIENT
Start: 2017-11-30 | End: 2017-11-30

## 2017-11-30 RX ORDER — DEXTROSE AND SODIUM CHLORIDE 5; .45 G/100ML; G/100ML
30 INJECTION, SOLUTION INTRAVENOUS CONTINUOUS PRN
Status: CANCELLED | OUTPATIENT
Start: 2018-01-19

## 2017-12-07 ENCOUNTER — APPOINTMENT (OUTPATIENT)
Dept: LAB | Facility: HOSPITAL | Age: 57
End: 2017-12-07

## 2017-12-07 ENCOUNTER — OFFICE VISIT (OUTPATIENT)
Dept: FAMILY MEDICINE CLINIC | Facility: CLINIC | Age: 57
End: 2017-12-07

## 2017-12-07 VITALS
HEART RATE: 120 BPM | WEIGHT: 220 LBS | DIASTOLIC BLOOD PRESSURE: 110 MMHG | SYSTOLIC BLOOD PRESSURE: 158 MMHG | BODY MASS INDEX: 43.19 KG/M2 | OXYGEN SATURATION: 98 % | HEIGHT: 60 IN

## 2017-12-07 DIAGNOSIS — I10 ESSENTIAL HYPERTENSION: ICD-10-CM

## 2017-12-07 DIAGNOSIS — E03.8 HYPOTHYROIDISM DUE TO HASHIMOTO'S THYROIDITIS: ICD-10-CM

## 2017-12-07 DIAGNOSIS — F32.9 REACTIVE DEPRESSION: Primary | ICD-10-CM

## 2017-12-07 DIAGNOSIS — E06.3 HYPOTHYROIDISM DUE TO HASHIMOTO'S THYROIDITIS: ICD-10-CM

## 2017-12-07 LAB
ALBUMIN SERPL-MCNC: 4.7 G/DL (ref 3.4–4.8)
ALP SERPL-CCNC: 70 U/L (ref 38–126)
ALT SERPL W P-5'-P-CCNC: 33 U/L (ref 9–52)
AST SERPL-CCNC: 50 U/L (ref 14–36)
BILIRUB CONJ SERPL-MCNC: 0 MG/DL (ref 0–0.3)
BILIRUB INDIRECT SERPL-MCNC: 0.6 MG/DL (ref 0–1.1)
BILIRUB SERPL-MCNC: 1.2 MG/DL (ref 0.2–1.3)
INR PPP: 0.88 (ref 0.8–1.2)
PROT SERPL-MCNC: 8.1 G/DL (ref 6.3–8.6)
PROTHROMBIN TIME: 11.8 SECONDS (ref 11.1–15.3)

## 2017-12-07 PROCEDURE — 82247 BILIRUBIN TOTAL: CPT | Performed by: PHYSICIAN ASSISTANT

## 2017-12-07 PROCEDURE — 83883 ASSAY NEPHELOMETRY NOT SPEC: CPT | Performed by: PHYSICIAN ASSISTANT

## 2017-12-07 PROCEDURE — 82947 ASSAY GLUCOSE BLOOD QUANT: CPT | Performed by: PHYSICIAN ASSISTANT

## 2017-12-07 PROCEDURE — 84450 TRANSFERASE (AST) (SGOT): CPT | Performed by: PHYSICIAN ASSISTANT

## 2017-12-07 PROCEDURE — 83010 ASSAY OF HAPTOGLOBIN QUANT: CPT | Performed by: PHYSICIAN ASSISTANT

## 2017-12-07 PROCEDURE — 80074 ACUTE HEPATITIS PANEL: CPT | Performed by: PHYSICIAN ASSISTANT

## 2017-12-07 PROCEDURE — 82977 ASSAY OF GGT: CPT | Performed by: PHYSICIAN ASSISTANT

## 2017-12-07 PROCEDURE — 84478 ASSAY OF TRIGLYCERIDES: CPT | Performed by: PHYSICIAN ASSISTANT

## 2017-12-07 PROCEDURE — 84460 ALANINE AMINO (ALT) (SGPT): CPT | Performed by: PHYSICIAN ASSISTANT

## 2017-12-07 PROCEDURE — 82465 ASSAY BLD/SERUM CHOLESTEROL: CPT | Performed by: PHYSICIAN ASSISTANT

## 2017-12-07 PROCEDURE — 85610 PROTHROMBIN TIME: CPT | Performed by: PHYSICIAN ASSISTANT

## 2017-12-07 PROCEDURE — 80076 HEPATIC FUNCTION PANEL: CPT | Performed by: PHYSICIAN ASSISTANT

## 2017-12-07 PROCEDURE — 99214 OFFICE O/P EST MOD 30 MIN: CPT | Performed by: FAMILY MEDICINE

## 2017-12-07 PROCEDURE — 36415 COLL VENOUS BLD VENIPUNCTURE: CPT | Performed by: PHYSICIAN ASSISTANT

## 2017-12-07 RX ORDER — LISINOPRIL AND HYDROCHLOROTHIAZIDE 12.5; 1 MG/1; MG/1
1 TABLET ORAL DAILY
Qty: 90 TABLET | Refills: 2 | Status: SHIPPED | OUTPATIENT
Start: 2017-12-07 | End: 2018-07-03

## 2017-12-07 RX ORDER — BUPROPION HYDROCHLORIDE 150 MG/1
TABLET, EXTENDED RELEASE ORAL
Qty: 90 TABLET | Refills: 3 | Status: SHIPPED | OUTPATIENT
Start: 2017-12-07 | End: 2018-07-03

## 2017-12-07 NOTE — PROGRESS NOTES
Subjective   Argenis Blair is a 57 y.o. female. She is still very upset over her son who  in an accident 9 months ago. She is going to counseling at her [employer] but is still not sleeping well. Her blood pressure has been up and she cries a great deal.    Depression   Visit Type: follow-up  Patient presents with the following symptoms: anhedonia, decreased concentration, depressed mood, dizziness, excessive worry, insomnia, muscle tension and nervousness/anxiety.  Patient is not experiencing: confusion, palpitations, shortness of breath, suicidal ideas, suicidal planning, thoughts of death, weight gain and weight loss.  Frequency of symptoms: constantly   Severity: severe   Sleep quality: poor  Nighttime awakenings: occasional  Compliance with medications:  %             The following portions of the patient's history were reviewed and updated as appropriate: allergies, current medications, past family history, past medical history, past social history, past surgical history and problem list.    Review of Systems   Constitutional: Negative for activity change, appetite change, fatigue, fever, weight gain and weight loss.   HENT: Negative for ear pain and sore throat.    Eyes: Negative for pain and visual disturbance.   Respiratory: Negative for cough and shortness of breath.    Cardiovascular: Negative for chest pain and palpitations.   Gastrointestinal: Negative for abdominal pain and nausea.   Endocrine: Negative for cold intolerance and heat intolerance.   Genitourinary: Negative for difficulty urinating and dysuria.   Musculoskeletal: Negative for arthralgias and gait problem.   Skin: Negative for color change and rash.   Neurological: Negative for dizziness, weakness and headaches.   Hematological: Negative for adenopathy. Does not bruise/bleed easily.   Psychiatric/Behavioral: Positive for decreased concentration, dysphoric mood and sleep disturbance. Negative for agitation, confusion, self-injury  and suicidal ideas. The patient is nervous/anxious and has insomnia.        Objective   Physical Exam   Constitutional: She is oriented to person, place, and time. Vital signs are normal. She appears well-developed and well-nourished.   HENT:   Head: Normocephalic and atraumatic.   Right Ear: External ear normal.   Left Ear: External ear normal.   Nose: Nose normal.   Mouth/Throat: Oropharynx is clear and moist.   Eyes: Conjunctivae and EOM are normal.   Neck: Normal range of motion. Neck supple.   Cardiovascular: Normal rate, regular rhythm and normal heart sounds.    Pulmonary/Chest: Effort normal and breath sounds normal.   Abdominal: Soft. Bowel sounds are normal. She exhibits no distension. There is no tenderness.   Musculoskeletal: Normal range of motion.   Neurological: She is alert and oriented to person, place, and time.   Skin: Skin is warm and dry.   Psychiatric: Judgment normal. Her mood appears anxious. Her speech is rapid and/or pressured. She is agitated. Cognition and memory are normal.   tearful       Assessment/Plan   Argenis was seen today for depression and fibromyalgia.    Diagnoses and all orders for this visit:    Reactive depression  -     buPROPion SR (WELLBUTRIN SR) 150 MG 12 hr tablet; 1 by mouth three times daily    Essential hypertension  -     lisinopril-hydrochlorothiazide (ZESTORETIC) 10-12.5 MG per tablet; Take 1 tablet by mouth Daily.      Follow up in one month.          This document has been electronically signed by Enoc Lange DO on December 7, 2017 4:37 PM

## 2017-12-07 NOTE — PATIENT INSTRUCTIONS

## 2017-12-08 LAB
HAV IGM SERPL QL IA: NEGATIVE
HBV CORE IGM SERPL QL IA: NEGATIVE
HBV SURFACE AG SERPL QL IA: NEGATIVE
HCV AB SER DONR QL: NEGATIVE

## 2017-12-08 RX ORDER — LEVOTHYROXINE SODIUM 175 UG/1
TABLET ORAL
Qty: 30 TABLET | Refills: 2 | Status: SHIPPED | OUTPATIENT
Start: 2017-12-08 | End: 2018-04-09 | Stop reason: SDUPTHER

## 2017-12-12 LAB
A2 MACROGLOB SERPL-MCNC: 221 MG/DL (ref 110–276)
ALT SERPL W P-5'-P-CCNC: 26 IU/L (ref 0–40)
APO A-I SERPL-MCNC: 162 MG/DL (ref 116–209)
AST SERPL W P-5'-P-CCNC: 35 IU/L (ref 0–40)
BILIRUB SERPL-MCNC: 0.6 MG/DL (ref 0–1.2)
CHOLEST SERPL-MCNC: 201 MG/DL (ref 100–199)
FIBROSIS SCORING:: ABNORMAL
FIBROSIS STAGE SERPL QL: ABNORMAL
GGT SERPL-CCNC: 25 IU/L (ref 0–60)
GLUCOSE SERPL-MCNC: 99 MG/DL (ref 65–99)
HAPTOGLOB SERPL-MCNC: 223 MG/DL (ref 34–200)
INTERPRETATIONS: (REFERENCE): ABNORMAL
LABORATORY COMMENT REPORT: ABNORMAL
LIMITATIONS: (REFERENCE): ABNORMAL
LIVER FIBR SCORE SERPL CALC.FIBROSURE: 0.17 (ref 0–0.21)
NASH GRADE (REFERENCE): ABNORMAL
NASH SCORE (REFERENCE): 0.5
NASH SCORING (REFERENCE): ABNORMAL
STEATOSIS GRADE (REFERENCE): ABNORMAL
STEATOSIS GRADING (REFERENCE): ABNORMAL
STEATOSIS SCORE (REFERENCE): 0.59 (ref 0–0.3)
TRIGL SERPL-MCNC: 227 MG/DL (ref 0–149)
WEIGHT: (REFERENCE): 220 LBS

## 2018-01-05 ENCOUNTER — OFFICE VISIT (OUTPATIENT)
Dept: FAMILY MEDICINE CLINIC | Facility: CLINIC | Age: 58
End: 2018-01-05

## 2018-01-05 VITALS
WEIGHT: 220 LBS | DIASTOLIC BLOOD PRESSURE: 80 MMHG | OXYGEN SATURATION: 98 % | SYSTOLIC BLOOD PRESSURE: 122 MMHG | BODY MASS INDEX: 43.19 KG/M2 | HEART RATE: 108 BPM | HEIGHT: 60 IN

## 2018-01-05 DIAGNOSIS — Z71.89 COUNSELING FOR BEREAVEMENT: ICD-10-CM

## 2018-01-05 DIAGNOSIS — F43.21 GRIEF REACTION: Primary | ICD-10-CM

## 2018-01-05 DIAGNOSIS — F51.01 PRIMARY INSOMNIA: ICD-10-CM

## 2018-01-05 DIAGNOSIS — Z63.9 FAMILY PROBLEMS: ICD-10-CM

## 2018-01-05 DIAGNOSIS — I10 ESSENTIAL HYPERTENSION: ICD-10-CM

## 2018-01-05 DIAGNOSIS — F32.9 REACTIVE DEPRESSION: ICD-10-CM

## 2018-01-05 PROCEDURE — 99214 OFFICE O/P EST MOD 30 MIN: CPT | Performed by: FAMILY MEDICINE

## 2018-01-05 SDOH — SOCIAL STABILITY - SOCIAL INSECURITY: PROBLEM RELATED TO PRIMARY SUPPORT GROUP, UNSPECIFIED: Z63.9

## 2018-01-05 NOTE — PATIENT INSTRUCTIONS
Major Depressive Disorder  Major depressive disorder is a mental illness. It also may be called clinical depression or unipolar depression. Major depressive disorder usually causes feelings of sadness, hopelessness, or helplessness. Some people with this disorder do not feel particularly sad but lose interest in doing things they used to enjoy (anhedonia). Major depressive disorder also can cause physical symptoms. It can interfere with work, school, relationships, and other normal everyday activities. The disorder varies in severity but is longer lasting and more serious than the sadness we all feel from time to time in our lives.  Major depressive disorder often is triggered by stressful life events or major life changes. Examples of these triggers include divorce, loss of your job or home, a move, and the death of a family member or close friend. Sometimes this disorder occurs for no obvious reason at all. People who have family members with major depressive disorder or bipolar disorder are at higher risk for developing this disorder, with or without life stressors. Major depressive disorder can occur at any age. It may occur just once in your life (single episode major depressive disorder). It may occur multiple times (recurrent major depressive disorder).  SYMPTOMS  People with major depressive disorder have either anhedonia or depressed mood on nearly a daily basis for at least 2 weeks or longer. Symptoms of depressed mood include:  · Feelings of sadness (blue or down in the dumps) or emptiness.  · Feelings of hopelessness or helplessness.  · Tearfulness or episodes of crying (may be observed by others).  · Irritability (children and adolescents).  In addition to depressed mood or anhedonia or both, people with this disorder have at least four of the following symptoms:  · Difficulty sleeping or sleeping too much.    · Significant change (increase or decrease) in appetite or weight.    · Lack of energy or  motivation.  · Feelings of guilt and worthlessness.    · Difficulty concentrating, remembering, or making decisions.  · Unusually slow movement (psychomotor retardation) or restlessness (as observed by others).    · Recurrent wishes for death, recurrent thoughts of self-harm (suicide), or a suicide attempt.  People with major depressive disorder commonly have persistent negative thoughts about themselves, other people, and the world. People with severe major depressive disorder may experience distorted beliefs or perceptions about the world (psychotic delusions). They also may see or hear things that are not real (psychotic hallucinations).  DIAGNOSIS  Major depressive disorder is diagnosed through an assessment by your health care provider. Your health care provider will ask about aspects of your daily life, such as mood, sleep, and appetite, to see if you have the diagnostic symptoms of major depressive disorder. Your health care provider may ask about your medical history and use of alcohol or drugs, including prescription medicines. Your health care provider also may do a physical exam and blood work. This is because certain medical conditions and the use of certain substances can cause major depressive disorder-like symptoms (secondary depression). Your health care provider also may refer you to a mental health specialist for further evaluation and treatment.  TREATMENT  It is important to recognize the symptoms of major depressive disorder and seek treatment. The following treatments can be prescribed for this disorder:    · Medicine. Antidepressant medicines usually are prescribed. Antidepressant medicines are thought to correct chemical imbalances in the brain that are commonly associated with major depressive disorder. Other types of medicine may be added if the symptoms do not respond to antidepressant medicines alone or if psychotic delusions or hallucinations occur.  · Talk therapy. Talk therapy can be  helpful in treating major depressive disorder by providing support, education, and guidance. Certain types of talk therapy also can help with negative thinking (cognitive behavioral therapy) and with relationship issues that trigger this disorder (interpersonal therapy).  A mental health specialist can help determine which treatment is best for you. Most people with major depressive disorder do well with a combination of medicine and talk therapy. Treatments involving electrical stimulation of the brain can be used in situations with extremely severe symptoms or when medicine and talk therapy do not work over time. These treatments include electroconvulsive therapy, transcranial magnetic stimulation, and vagal nerve stimulation.     This information is not intended to replace advice given to you by your health care provider. Make sure you discuss any questions you have with your health care provider.     Document Released: 04/14/2014 Document Revised: 01/08/2016 Document Reviewed: 04/14/2014  [employer] Interactive Patient Education ©2017 [employer]
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
## 2018-01-19 ENCOUNTER — ANESTHESIA (OUTPATIENT)
Dept: GASTROENTEROLOGY | Facility: HOSPITAL | Age: 58
End: 2018-01-19

## 2018-01-19 ENCOUNTER — HOSPITAL ENCOUNTER (OUTPATIENT)
Facility: HOSPITAL | Age: 58
Setting detail: HOSPITAL OUTPATIENT SURGERY
Discharge: HOME | End: 2018-01-19
Attending: INTERNAL MEDICINE | Admitting: INTERNAL MEDICINE

## 2018-01-19 ENCOUNTER — ANESTHESIA EVENT (OUTPATIENT)
Dept: GASTROENTEROLOGY | Facility: HOSPITAL | Age: 58
End: 2018-01-19

## 2018-01-19 VITALS
SYSTOLIC BLOOD PRESSURE: 121 MMHG | RESPIRATION RATE: 18 BRPM | DIASTOLIC BLOOD PRESSURE: 70 MMHG | OXYGEN SATURATION: 99 % | BODY MASS INDEX: 42.85 KG/M2 | HEIGHT: 60 IN | HEART RATE: 90 BPM | TEMPERATURE: 97 F | WEIGHT: 218.26 LBS

## 2018-01-19 DIAGNOSIS — R11.0 NAUSEA: ICD-10-CM

## 2018-01-19 DIAGNOSIS — K62.5 HEMORRHAGE OF ANUS AND RECTUM: ICD-10-CM

## 2018-01-19 DIAGNOSIS — K21.9 GASTROESOPHAGEAL REFLUX DISEASE, ESOPHAGITIS PRESENCE NOT SPECIFIED: ICD-10-CM

## 2018-01-19 DIAGNOSIS — K59.00 CONSTIPATION, UNSPECIFIED CONSTIPATION TYPE: ICD-10-CM

## 2018-01-19 DIAGNOSIS — R19.4 CHANGE IN BOWEL HABITS: ICD-10-CM

## 2018-01-19 PROCEDURE — 43239 EGD BIOPSY SINGLE/MULTIPLE: CPT | Performed by: INTERNAL MEDICINE

## 2018-01-19 PROCEDURE — 45378 DIAGNOSTIC COLONOSCOPY: CPT | Performed by: INTERNAL MEDICINE

## 2018-01-19 PROCEDURE — 25010000002 PROPOFOL 10 MG/ML EMULSION: Performed by: NURSE ANESTHETIST, CERTIFIED REGISTERED

## 2018-01-19 PROCEDURE — 88305 TISSUE EXAM BY PATHOLOGIST: CPT | Performed by: PATHOLOGY

## 2018-01-19 PROCEDURE — 88305 TISSUE EXAM BY PATHOLOGIST: CPT | Performed by: INTERNAL MEDICINE

## 2018-01-19 RX ORDER — LIDOCAINE HYDROCHLORIDE 10 MG/ML
INJECTION, SOLUTION INFILTRATION; PERINEURAL AS NEEDED
Status: DISCONTINUED | OUTPATIENT
Start: 2018-01-19 | End: 2018-01-19 | Stop reason: SURG

## 2018-01-19 RX ORDER — PROPOFOL 10 MG/ML
VIAL (ML) INTRAVENOUS AS NEEDED
Status: DISCONTINUED | OUTPATIENT
Start: 2018-01-19 | End: 2018-01-19 | Stop reason: SURG

## 2018-01-19 RX ORDER — LISINOPRIL 5 MG/1
5 TABLET ORAL DAILY
COMMUNITY
End: 2018-07-03

## 2018-01-19 RX ORDER — ONDANSETRON 2 MG/ML
4 INJECTION INTRAMUSCULAR; INTRAVENOUS ONCE AS NEEDED
Status: DISCONTINUED | OUTPATIENT
Start: 2018-01-19 | End: 2018-01-19 | Stop reason: HOSPADM

## 2018-01-19 RX ORDER — PROMETHAZINE HYDROCHLORIDE 25 MG/ML
12.5 INJECTION, SOLUTION INTRAMUSCULAR; INTRAVENOUS ONCE AS NEEDED
Status: DISCONTINUED | OUTPATIENT
Start: 2018-01-19 | End: 2018-01-19 | Stop reason: HOSPADM

## 2018-01-19 RX ORDER — DEXTROSE AND SODIUM CHLORIDE 5; .45 G/100ML; G/100ML
20 INJECTION, SOLUTION INTRAVENOUS CONTINUOUS
Status: DISCONTINUED | OUTPATIENT
Start: 2018-01-19 | End: 2018-01-19 | Stop reason: HOSPADM

## 2018-01-19 RX ORDER — PROMETHAZINE HYDROCHLORIDE 25 MG/1
25 SUPPOSITORY RECTAL ONCE AS NEEDED
Status: DISCONTINUED | OUTPATIENT
Start: 2018-01-19 | End: 2018-01-19 | Stop reason: HOSPADM

## 2018-01-19 RX ORDER — PROMETHAZINE HYDROCHLORIDE 25 MG/1
25 TABLET ORAL ONCE AS NEEDED
Status: DISCONTINUED | OUTPATIENT
Start: 2018-01-19 | End: 2018-01-19 | Stop reason: HOSPADM

## 2018-01-19 RX ADMIN — LIDOCAINE HYDROCHLORIDE 100 MG: 10 INJECTION, SOLUTION INFILTRATION; PERINEURAL at 14:38

## 2018-01-19 RX ADMIN — PROPOFOL 100 MG: 10 INJECTION, EMULSION INTRAVENOUS at 14:40

## 2018-01-19 RX ADMIN — DEXTROSE AND SODIUM CHLORIDE 20 ML/HR: 5; 450 INJECTION, SOLUTION INTRAVENOUS at 14:22

## 2018-01-19 RX ADMIN — PROPOFOL 50 MG: 10 INJECTION, EMULSION INTRAVENOUS at 14:46

## 2018-01-19 RX ADMIN — PROPOFOL 100 MG: 10 INJECTION, EMULSION INTRAVENOUS at 14:38

## 2018-01-19 RX ADMIN — PROPOFOL 50 MG: 10 INJECTION, EMULSION INTRAVENOUS at 14:50

## 2018-01-19 RX ADMIN — PROPOFOL 50 MG: 10 INJECTION, EMULSION INTRAVENOUS at 14:43

## 2018-01-19 NOTE — ANESTHESIA POSTPROCEDURE EVALUATION
Patient: Argenis Blair    Procedure Summary     Date Anesthesia Start Anesthesia Stop Room / Location    01/19/18 1434 [id number] [facility] ENDOSCOPY 1 / [facility] ENDOSCOPY       Procedure Diagnosis Surgeon Provider    ESOPHAGOGASTRODUODENOSCOPY--before follow up (N/A Esophagus); COLONOSCOPY (N/A ) Nausea; Hemorrhage of anus and rectum; Change in bowel habits; Constipation, unspecified constipation type; Gastroesophageal reflux disease, esophagitis presence not specified  (Nausea [R11.0]; Hemorrhage of anus and rectum [K62.5]; Change in bowel habits [R19.4]; Constipation, unspecified constipation type [K59.00]; Gastroesophageal reflux disease, esophagitis presence not specified [K21.9]) MD Carmen Edge CRNA          Anesthesia Type: MAC  Last vitals  BP   146/82 (01/19/18 1409)   Temp   97 °F (36.1 °C) (01/19/18 1409)   Pulse   97 (01/19/18 1409)   Resp   16 (01/19/18 1409)     SpO2   98 % (01/19/18 1409)     Post Anesthesia Care and Evaluation    Patient location during evaluation: bedside  Patient participation: complete - patient participated  Level of consciousness: responsive to verbal stimuli  Pain management: adequate  Airway patency: patent  Anesthetic complications: No anesthetic complications    Cardiovascular status: acceptable  Respiratory status: acceptable  Hydration status: acceptable

## 2018-01-19 NOTE — H&P
Progress Notes  Encounter Date: 1/19/2018  Samson chavez  Gastroenterology   Expand All Collapse All    []Hide copied text  []Hover for attribution information  Chief Complaint   Patient presents with   • Hematochezia       Ref. Dr. Nazario GRAY PROCEDURE ORDERED: EGD/COLON, N/GERD, change bowel, blood stool        Subjective        Argenis Blair is a 57 y.o. female. she is being seen for consultation today at the request of Enoc Lange DO     History of Present Illness     This 57-year-old female lists no occupation was sent for consultation for blood in her stool and heartburn by Dr. Lange who saw the patient on 10/25/17.  TSH, T4 that date were normal.  Patient presents with 1 out of 10 abdominal pain today.  She claims to never get heartburn but is taking Zofran and Protonix regularly.  She has nausea at times.  But denied vomiting.  She does have dysphagia and states pills seem to hang in the back of her throat.  She's had increasing constipation and has been taking MiraLAX.  She has been more constipated for the past several months.  She seen bright red blood on the tissue paper and in the bowl.  She states normally she had diarrhea since cholecystectomy many years ago.  Her weight is been fairly stable.  She's never had endoscopy.     Acute abdominal series showed granulomas in the lung on 6/23/17.     Patient went to the ER on 8/19/17 showed normal amylase lipase, urinalysis.  CMP showed glucose 110, protein 5.2, total bilirubin 1.6, AST 50, ALT 54, otherwise normal.  CBC showed white count of 10.32, hemoglobin 17.2, hematocrit 49.8.  CT scan abdomen pelvis showed degeneration of lumbar spine, post-hysterectomy, cholecystectomy, diverticulosis of the colon with moderate stool.     Patient currently denies tobacco, alcohol, illicit substance use.  She is a previous half pack a day smoker off and on for 10 years.  She denied alcohol, illicit substance use.  She has a history of major  depressive disorder, DNC, tubal ligation, cholecystectomy, hysterectomy, bladder repair, parathyroid resection.  Family history diabetes, heart disease, ulcer, unknown cancer, gallstones, hypertension.  Father  age 73 with CHF.  Mother age 74 diabetes, A. fib, pacemaker.  Spouse age 57 in good health.  2 brothers still living with heart disease and arthritis.  One child living, one child  age 28 with gunshot wound.     A/P: Nausea, GERD, dysphagia at risk for Ardon's, possible peptic stricture.  Recommend EGD.  She is hoarse and describes more cervical dysphagia, would consider ENT evaluation.  Given her blood in her stool and her age, she has never had a colonoscopy, certainly at risk for occult colon lesion, recommend colonoscopy to evaluate.  Given her elevated liver enzymes suspect this is related to a fatty liver, recent imaging was okay in August of her liver.  Recommend Ledesma fibro-sure, hepatitis diagnostic panel, LFTs, INR.  Would consider further evaluation depending on the results of the above.  We'll see her in follow-up after the above, further pending clinical course and the results the above.     Thank you very much Dr. Lange for this consultation, and for allowing us to participate in care of your patient.  We'll keep you informed.      The following portions of the patient's history were reviewed and updated as appropriate:    Medical History         Past Medical History:   Diagnosis Date   • Allergy to bee sting     • Essential (primary) hypertension     • Hashimoto's thyroiditis     • Hypertensive left ventricular hypertrophy     • Osteoporosis     • Primary hyperparathyroidism       s/p parathyroidectomy      • Vitamin D deficiency            Surgical History          Past Surgical History:   Procedure Laterality Date   • BLADDER SURGERY      • CHOLECYSTECTOMY      • DILATATION AND CURETTAGE      • HYSTERECTOMY      • PARATHYROID GLAND SURGERY       • TUBAL ABDOMINAL  LIGATION   1988               Family History   Problem Relation Age of Onset   • Diabetes Mother     • Heart disease Mother     • Heart disease Father     • Heart disease Brother     • Cancer Other     • Breast cancer Other     • Hypertension Other     • Lung cancer Other     • Lung disease Other            OB History      No data available               Allergies   Allergen Reactions   • Percocet [Oxycodone-Acetaminophen] Delirium   • Claritin [Loratadine]     • Keflex [Cephalexin] Nausea And Vomiting   • Sulfa Antibiotics         Social History    Social History            Social History   • Marital status:        Spouse name: N/A   • Number of children: N/A   • Years of education: N/A           Social History Main Topics   • Smoking status: Never Smoker   • Smokeless tobacco: Never Used   • Alcohol use No   • Drug use: Defer   • Sexual activity: Defer           Other Topics Concern   • None      Social History Narrative            Current Outpatient Prescriptions:   •  cyclobenzaprine (FLEXERIL) 10 MG tablet, Take 1 tablet by mouth 3 (Three) Times a Day As Needed for Muscle Spasms., Disp: 30 tablet, Rfl: 2  •  famciclovir (FAMVIR) 500 MG tablet, Take 1 tablet by mouth 3 (Three) Times a Day., Disp: 21 tablet, Rfl: 2  •  magnesium oxide (MAGOX) 400 (241.3 MG) MG tablet tablet, Take 400 mg by mouth Daily., Disp: , Rfl:   •  meloxicam (MOBIC) 15 MG tablet, Take 1 tablet by mouth Daily., Disp: 30 tablet, Rfl: 5  •  ondansetron (ZOFRAN) 4 MG tablet, Take 1 tablet by mouth Every 8 (Eight) Hours As Needed for Nausea or Vomiting., Disp: 10 tablet, Rfl: 0  •  pantoprazole (PROTONIX) 40 MG EC tablet, Take 1 tablet by mouth Daily., Disp: 30 tablet, Rfl: 2  •  polyethylene glycol (MIRALAX) packet, Take 17 g by mouth Daily., Disp: 30 each, Rfl: 0  •  buPROPion SR (WELLBUTRIN SR) 150 MG 12 hr tablet, 1 by mouth three times daily, Disp: 90 tablet, Rfl: 3  •  levothyroxine (SYNTHROID, LEVOTHROID) 175 MCG tablet, TAKE ONE  "TABLET BY MOUTH ONCE DAILY, Disp: 30 tablet, Rfl: 2  •  lisinopril-hydrochlorothiazide (ZESTORETIC) 10-12.5 MG per tablet, Take 1 tablet by mouth Daily., Disp: 90 tablet, Rfl: 2  Review of Systems  Review of Systems   Constitutional: Negative for unexpected weight change.   HENT: Positive for trouble swallowing.    Cardiovascular: Negative for chest pain.   Gastrointestinal: Positive for abdominal pain, anal bleeding, blood in stool and constipation. Negative for abdominal distention, diarrhea, nausea, rectal pain and vomiting.   Genitourinary: Negative for difficulty urinating.   All other systems reviewed and are negative.                                 Objective        /88 (BP Location: Left arm)  Pulse 106  Ht 152.4 cm (60\")  Wt 102 kg (223 lb 12.8 oz)  BMI 43.71 kg/m2  Physical Exam   Constitutional: She is oriented to person, place, and time. She appears well-developed and well-nourished. No distress.   HENT:   Head: Normocephalic and atraumatic.   Eyes: EOM are normal. Pupils are equal, round, and reactive to light.   Neck: Normal range of motion.   Cardiovascular: Normal rate, regular rhythm and normal heart sounds.    Pulmonary/Chest: Effort normal and breath sounds normal.   Abdominal: Soft. Bowel sounds are normal. She exhibits no shifting dullness, no distension, no abdominal bruit, no ascites and no mass. There is no hepatosplenomegaly. There is tenderness. There is no rigidity, no rebound, no guarding and no CVA tenderness. No hernia. Hernia confirmed negative in the ventral area.   Obese, mild diffue   Musculoskeletal: Normal range of motion.   Neurological: She is alert and oriented to person, place, and time.   Skin: Skin is warm and dry.   Psychiatric: She has a normal mood and affect. Her behavior is normal. Judgment and thought content normal.   Nursing note and vitals reviewed.        Assessment/Plan           1. Nausea    2. Hemorrhage of anus and rectum    3. Gastroesophageal reflux " disease, esophagitis presence not specified    4. Constipation, unspecified constipation type    5. Change in bowel habits    .   Argenis was seen today for hematochezia.     Diagnoses and all orders for this visit:     Nausea  -     Hepatic Function Panel  -     Hepatitis Panel, Acute  -     VERDIN Fibrosure  -     Protime-INR  -     Case Request; Standing  -     dextrose 5 % and sodium chloride 0.45 % infusion; Infuse 30 mL/hr into a venous catheter Continuous As Needed (Start Prior to Procedure).  -     Case Request     Hemorrhage of anus and rectum  -     Hepatic Function Panel  -     Hepatitis Panel, Acute  -     VERDIN Fibrosure  -     Protime-INR  -     Case Request; Standing  -     dextrose 5 % and sodium chloride 0.45 % infusion; Infuse 30 mL/hr into a venous catheter Continuous As Needed (Start Prior to Procedure).  -     Case Request     Gastroesophageal reflux disease, esophagitis presence not specified  -     Hepatic Function Panel  -     Hepatitis Panel, Acute  -     VERDIN Fibrosure  -     Protime-INR  -     Case Request; Standing  -     dextrose 5 % and sodium chloride 0.45 % infusion; Infuse 30 mL/hr into a venous catheter Continuous As Needed (Start Prior to Procedure).  -     Case Request     Constipation, unspecified constipation type  -     Hepatic Function Panel  -     Hepatitis Panel, Acute  -     VERDIN Fibrosure  -     Protime-INR  -     Case Request; Standing  -     dextrose 5 % and sodium chloride 0.45 % infusion; Infuse 30 mL/hr into a venous catheter Continuous As Needed (Start Prior to Procedure).  -     Case Request     Change in bowel habits  -     Hepatic Function Panel  -     Hepatitis Panel, Acute  -     VERDIN Fibrosure  -     Protime-INR  -     Case Request; Standing  -     dextrose 5 % and sodium chloride 0.45 % infusion; Infuse 30 mL/hr into a venous catheter Continuous As Needed (Start Prior to Procedure).  -     Case Request     Other orders  -     sodium-potassium-magnesium sulfates  (SUPREP BOWEL PREP KIT) 17.5-3.13-1.6 GM/180ML solution oral solution; Take 1 bottle by mouth 1 (One) Time for 1 dose. Take as per instruction sheet for colonoscopy prep.  -     Obtain Informed Consent; Standing  -     POC Glucose Fingerstick; Standing           Orders placed during this encounter include:      Orders Placed This Encounter   Procedures   • Hepatic Function Panel   • Hepatitis Panel, Acute   • VERDIN Fibrosure   • Protime-INR         Medications prescribed:       New Medications Ordered This Visit   Medications   • sodium-potassium-magnesium sulfates (SUPREP BOWEL PREP KIT) 17.5-3.13-1.6 GM/180ML solution oral solution       Sig: Take 1 bottle by mouth 1 (One) Time for 1 dose. Take as per instruction sheet for colonoscopy prep.       Dispense:  1 bottle       Refill:  0         Requested Prescriptions             Signed Prescriptions Disp Refills   • sodium-potassium-magnesium sulfates (SUPREP BOWEL PREP KIT) 17.5-3.13-1.6 GM/180ML solution oral solution 1 bottle 0       Sig: Take 1 bottle by mouth 1 (One) Time for 1 dose. Take as per instruction sheet for colonoscopy prep.         Review and/or summary of lab tests, radiology, procedures, medications. Review and summary of old records and obtaining of history. The risks and benefits of my recommendations, as well as other treatment options were discussed with the patient today. Questions were answered.     Follow-up: Return in about 8 weeks (around 1/25/2018), or if symptoms worsen or fail to improve.     ESOPHAGOGASTRODUODENOSCOPY--before follow up (N/A), COLONOSCOPY (N/A)              This document has been electronically signed by Samson Smith MD on January 19, 2018 1:42 PM               Results for orders placed or performed in visit on 11/30/17   Hepatitis Panel, Acute   Result Value Ref Range     Hepatitis C Ab Negative Negative     Hep A IgM Negative Negative     Hep B C IgM Negative Negative     Hepatitis B Surface Ag Negative Negative    Protime-INR   Result Value Ref Range     Protime 11.8 11.1 - 15.3 Seconds     INR 0.88 0.80 - 1.20   Hepatic Function Panel   Result Value Ref Range     Total Protein 8.1 6.3 - 8.6 g/dL     Albumin 4.70 3.40 - 4.80 g/dL     ALT (SGPT) 33 9 - 52 U/L     AST (SGOT) 50 (H) 14 - 36 U/L     Alkaline Phosphatase 70 38 - 126 U/L     Total Bilirubin 1.2 0.2 - 1.3 mg/dL     Bilirubin, Direct 0.0 0.0 - 0.3 mg/dL     Bilirubin, Indirect 0.6 0.0 - 1.1 mg/dL   Results for orders placed or performed in visit on 10/25/17   TSH   Result Value Ref Range     TSH 0.480 0.460 - 4.680 mIU/mL   T4, free   Result Value Ref Range     Free T4 1.65 0.78 - 2.19 ng/dL   Results for orders placed or performed during the hospital encounter of 08/19/17   POCT Urinalysis (automated dipstick)   Result Value Ref Range     Color Straw Yellow, Straw, Dark Yellow, Renetta     Clarity, UA Clear Clear     Glucose, UA Negative Negative, 1000 mg/dL (3+) mg/dL     Bilirubin Negative Negative     Ketones, UA Negative Negative     Specific Gravity  1.005 1.005 - 1.030     Blood, UA Negative Negative     pH, Urine 6.0 5.0 - 8.0     Protein, POC Negative Negative mg/dL     Urobilinogen, UA Normal Normal     Leukocytes Negative Negative     Nitrite, UA Negative Negative   CBC Auto Differential   Result Value Ref Range     WBC 10.32 (H) 3.20 - 9.80 10*3/mm3     RBC 5.40 (H) 3.77 - 5.16 10*6/mm3     Hemoglobin 17.2 (H) 12.0 - 15.5 g/dL     Hematocrit 49.8 (H) 35.0 - 45.0 %     MCV 92.2 80.0 - 98.0 fL     MCH 31.9 26.5 - 34.0 pg     MCHC 34.5 31.4 - 36.0 g/dL     RDW 13.2 11.5 - 14.5 %     RDW-SD 44.2 36.4 - 46.3 fl     MPV 10.3 8.0 - 12.0 fL     Platelets 290 150 - 450 10*3/mm3     Neutrophil % 61.9 37.0 - 80.0 %     Lymphocyte % 29.4 10.0 - 50.0 %     Monocyte % 6.5 0.0 - 12.0 %     Eosinophil % 1.7 0.0 - 7.0 %     Basophil % 0.2 0.0 - 2.0 %     Immature Grans % 0.3 0.0 - 0.5 %     Neutrophils, Absolute 6.39 2.00 - 8.60 10*3/mm3     Lymphocytes, Absolute 3.03  0.60 - 4.20 10*3/mm3     Monocytes, Absolute 0.67 0.00 - 0.90 10*3/mm3     Eosinophils, Absolute 0.18 0.00 - 0.70 10*3/mm3     Basophils, Absolute 0.02 0.00 - 0.20 10*3/mm3     Immature Grans, Absolute 0.03 (H) 0.00 - 0.02 10*3/mm3   Lipase   Result Value Ref Range     Lipase 132 23 - 300 U/L   Amylase   Result Value Ref Range     Amylase 53 50 - 130 U/L   Comprehensive Metabolic Panel   Result Value Ref Range     Glucose 110 (H) 60 - 100 mg/dL     BUN 12 7 - 21 mg/dL     Creatinine 0.99 0.50 - 1.00 mg/dL     Sodium 140 137 - 145 mmol/L     Potassium 4.1 3.5 - 5.1 mmol/L     Chloride 99 95 - 110 mmol/L     CO2 27.0 22.0 - 31.0 mmol/L     Calcium 9.8 8.4 - 10.2 mg/dL     Total Protein 8.2 6.3 - 8.6 g/dL     Albumin 5.20 (H) 3.40 - 4.80 g/dL     ALT (SGPT) 54 (H) 9 - 52 U/L     AST (SGOT) 50 (H) 14 - 36 U/L     Alkaline Phosphatase 74 38 - 126 U/L     Total Bilirubin 1.6 (H) 0.2 - 1.3 mg/dL     eGFR Non  Amer 58 51 - 120 mL/min/1.73     Globulin 3.0 2.3 - 3.5 gm/dL     A/G Ratio 1.7 1.1 - 1.8 g/dL     BUN/Creatinine Ratio 12.1 7.0 - 25.0     Anion Gap 14.0 5.0 - 15.0 mmol/L   Results for orders placed or performed during the hospital encounter of 06/23/17   Urinalysis, Microscopic Only   Result Value Ref Range     RBC, UA 0-2 (A) None Seen /HPF     WBC, UA 6-12 (A) None Seen, 0-2, 3-5 /HPF     Bacteria, UA 1+ (A) None Seen /HPF     Squamous Epithelial Cells, UA 3-5 (A) None Seen, 0-2 /HPF     Hyaline Casts, UA 21-30 None Seen /LPF     Methodology Automated Microscopy     Urinalysis With / Culture If Indicated   Result Value Ref Range     Color, UA Yellow Yellow, Straw, Dark Yellow, Renetta     Appearance, UA Clear Clear     pH, UA <=5.0 5.0 - 9.0     Specific Gravity, UA 1.028 1.003 - 1.030     Glucose, UA Negative Negative     Ketones, UA Negative Negative     Bilirubin, UA Small (1+) (A) Negative     Blood, UA Negative Negative     Protein, UA Negative Negative     Leuk Esterase, UA Trace (A) Negative      Nitrite, UA Negative Negative     Urobilinogen, UA 0.2 E.U./dL 0.2 - 1.0 E.U./dL   CBC Auto Differential   Result Value Ref Range     WBC 9.06 3.20 - 9.80 10*3/mm3     RBC 5.05 3.77 - 5.16 10*6/mm3     Hemoglobin 15.9 (H) 12.0 - 15.5 g/dL     Hematocrit 46.5 (H) 35.0 - 45.0 %     MCV 92.1 80.0 - 98.0 fL     MCH 31.5 26.5 - 34.0 pg     MCHC 34.2 31.4 - 36.0 g/dL     RDW 13.0 11.5 - 14.5 %     RDW-SD 43.4 36.4 - 46.3 fl     MPV 10.7 8.0 - 12.0 fL     Platelets 234 150 - 450 10*3/mm3     Neutrophil % 69.5 37.0 - 80.0 %     Lymphocyte % 22.2 10.0 - 50.0 %     Monocyte % 6.8 0.0 - 12.0 %     Eosinophil % 1.1 0.0 - 7.0 %     Basophil % 0.1 0.0 - 2.0 %     Immature Grans % 0.3 0.0 - 0.5 %     Neutrophils, Absolute 6.29 2.00 - 8.60 10*3/mm3     Lymphocytes, Absolute 2.01 0.60 - 4.20 10*3/mm3     Monocytes, Absolute 0.62 0.00 - 0.90 10*3/mm3     Eosinophils, Absolute 0.10 0.00 - 0.70 10*3/mm3     Basophils, Absolute 0.01 0.00 - 0.20 10*3/mm3     Immature Grans, Absolute 0.03 (H) 0.00 - 0.02 10*3/mm3      *Note: Due to a large number of results and/or encounters for the requested time period, some results have not been displayed. A complete set of results can be found in Results Review.         Some portions of this note have been dictated using voice recognition software and may contain errors and/or omissions.            Office Visit on 11/30/2017              Detailed Report

## 2018-01-19 NOTE — ANESTHESIA PREPROCEDURE EVALUATION
Anesthesia Evaluation     NPO Solid Status: > 8 hours  NPO Liquid Status: > 6 hours     Airway   Mallampati: II  TM distance: <3 FB  Neck ROM: full  possible difficult intubation  Dental - normal exam     Pulmonary - normal exam   Cardiovascular - normal exam        Neuro/Psych  GI/Hepatic/Renal/Endo      Musculoskeletal     (+) myalgias,   Abdominal   (+) obese,    Substance History      OB/GYN          Other                                                Anesthesia Plan    ASA 3     MAC     intravenous induction   Anesthetic plan and risks discussed with patient.

## 2018-01-19 NOTE — PLAN OF CARE
Problem: Patient Care Overview (Adult)  Goal: Plan of Care Review  Outcome: Outcome(s) achieved Date Met: 01/19/18 01/19/18 1616   Coping/Psychosocial Response Interventions   Plan Of Care Reviewed With patient   Patient Care Overview   Progress no change       Problem: GI Endoscopy (Adult)  Goal: Signs and Symptoms of Listed Potential Problems Will be Absent or Manageable (GI Endoscopy)  Outcome: Outcome(s) achieved Date Met: 01/19/18 01/19/18 1616   GI Endoscopy   Problems Assessed (GI Endoscopy) all   Problems Present (GI Endoscopy) none

## 2018-01-19 NOTE — PLAN OF CARE
Problem: Patient Care Overview (Adult)  Goal: Plan of Care Review  Outcome: Ongoing (interventions implemented as appropriate)   01/19/18 1452   Coping/Psychosocial Response Interventions   Plan Of Care Reviewed With patient   Patient Care Overview   Progress no change       Problem: GI Endoscopy (Adult)  Goal: Signs and Symptoms of Listed Potential Problems Will be Absent or Manageable (GI Endoscopy)  Outcome: Ongoing (interventions implemented as appropriate)   01/19/18 1452   GI Endoscopy   Problems Assessed (GI Endoscopy) all   Problems Present (GI Endoscopy) none

## 2018-01-19 NOTE — NURSING NOTE
Family at BS, pt stable, emotional from previous death in family, will provide support until pt to go home

## 2018-01-22 LAB
LAB AP CASE REPORT: NORMAL
Lab: NORMAL
PATH REPORT.FINAL DX SPEC: NORMAL
PATH REPORT.GROSS SPEC: NORMAL

## 2018-01-25 ENCOUNTER — OFFICE VISIT (OUTPATIENT)
Dept: GASTROENTEROLOGY | Facility: CLINIC | Age: 58
End: 2018-01-25

## 2018-01-25 VITALS
DIASTOLIC BLOOD PRESSURE: 72 MMHG | WEIGHT: 221 LBS | HEART RATE: 122 BPM | BODY MASS INDEX: 43.39 KG/M2 | SYSTOLIC BLOOD PRESSURE: 128 MMHG | HEIGHT: 60 IN

## 2018-01-25 DIAGNOSIS — K21.00 GASTROESOPHAGEAL REFLUX DISEASE WITH ESOPHAGITIS: Primary | ICD-10-CM

## 2018-01-25 DIAGNOSIS — K76.0 FATTY LIVER: ICD-10-CM

## 2018-01-25 DIAGNOSIS — K59.00 CONSTIPATION, UNSPECIFIED CONSTIPATION TYPE: ICD-10-CM

## 2018-01-25 PROCEDURE — 99214 OFFICE O/P EST MOD 30 MIN: CPT | Performed by: PHYSICIAN ASSISTANT

## 2018-01-25 RX ORDER — ESOMEPRAZOLE MAGNESIUM 40 MG/1
40 CAPSULE, DELAYED RELEASE ORAL
Qty: 30 CAPSULE | Refills: 3 | Status: SHIPPED | OUTPATIENT
Start: 2018-01-25 | End: 2019-02-14 | Stop reason: SDUPTHER

## 2018-01-25 NOTE — PROGRESS NOTES
Chief Complaint   Patient presents with   • Nausea   • Heartburn   • Constipation       ENDO PROCEDURE ORDERED:    Subjective    Argenis Blair is a 57 y.o. female. she is here today for follow-up.    History of Present Illness    SUBJECTIVE:  The patient was seen on recheck of her GERD, abdominal pain, constipation, blood in stool.  She was last seen on 11/30/2017.  The patient underwent EGD/colonoscopy on 01/19/2018 which showed esophagitis, diffuse gastritis.  Distal esophageal biopsy showed mild inflammation, antral biopsies showed mild chronic gastritis, negative H. pylori.  Colonoscopy showed internal and external hemorrhoids, fair prep.  There was some early diverticular disease in the sigmoid colon, not mentioned in the report.  Recommended repeat colonoscopy in 5 years.    The patient still complains of 4/10 diffuse abdominal pain.  She is taking MiraLax for her constipation, denies seeing any blood in her stool since last visit.  She denies heartburn, but is still taking the Protonix.  She is taking Zofran for her nausea.  She denied dysphagia.  Weight is down 3 pounds since last visit.    Laboratories on 12/07/2017:  INR 0.88.  VERDIN FibroSure 0.17/F0, steatosis 0.59/F2, 0.50/N1.  Within the panel, cholesterol 201, triglycerides 227.  Hepatitis diagnostic panel was negative.  LFTs showed an AST of 50, otherwise normal.    ASSESSMENT/PLAN:  Esophagitis and gastritis with negative biopsies.  She is not well controlled on Protonix.  I suggested discontinuing the Protonix and switching her to Nexium.  I suspect her upper abdominal pain and nausea are related to reflux.  She may have nocturnal reflux.  I made a number of recommendations.  I recommended she avoid gastric irritants.  I encouraged dietary modification of weight loss.  She had some early diverticular disease.  I recommended high-fiber/diverticular diet.  She does have significant steatohepatitis with elevated liver enzymes secondary to above.  I  discussed dietary modification and weight loss, avoiding fructose.  We will plan follow up in 6 weeks, further pending clinical course and the results of the above.         The following portions of the patient's history were reviewed and updated as appropriate:   Past Medical History:   Diagnosis Date   • Allergy to bee sting    • Essential (primary) hypertension    • Hashimoto's thyroiditis    • Hypertensive left ventricular hypertrophy    • Osteoporosis    • PONV (postoperative nausea and vomiting)    • Primary hyperparathyroidism     s/p parathyroidectomy      • Vitamin D deficiency      Past Surgical History:   Procedure Laterality Date   • BLADDER SURGERY  2004   • CHOLECYSTECTOMY  2002   • COLONOSCOPY N/A 1/19/2018    Procedure: COLONOSCOPY;  Surgeon: Samson Smith MD;  Location: [facility] ENDOSCOPY;  Service:    • DILATATION AND CURETTAGE  1986   • ENDOSCOPY N/A 1/19/2018    Procedure: ESOPHAGOGASTRODUODENOSCOPY--before follow up;  Surgeon: Samson Smith MD;  Location: [facility] ENDOSCOPY;  Service:    • HYSTERECTOMY  2004   • PARATHYROID GLAND SURGERY     • TUBAL ABDOMINAL LIGATION  1988   • UPPER GASTROINTESTINAL ENDOSCOPY  01/19/2018     Family History   Problem Relation Age of Onset   • Diabetes Mother    • Heart disease Mother    • Heart disease Father    • Heart disease Brother    • Cancer Other    • Breast cancer Other    • Hypertension Other    • Lung cancer Other    • Lung disease Other      OB History     No data available        Allergies   Allergen Reactions   • Percocet [Oxycodone-Acetaminophen] Delirium   • Claritin [Loratadine]    • Keflex [Cephalexin] Nausea And Vomiting   • Sulfa Antibiotics      Social History     Social History   • Marital status:      Spouse name: N/A   • Number of children: N/A   • Years of education: N/A     Social History Main Topics   • Smoking status: Never Smoker   • Smokeless tobacco: Never Used   • Alcohol use No   • Drug use: Defer   • Sexual activity: Defer  "    Other Topics Concern   • None     Social History Narrative       Current Outpatient Prescriptions:   •  buPROPion SR (WELLBUTRIN SR) 150 MG 12 hr tablet, 1 by mouth three times daily (Patient taking differently: Take  by mouth 2 (Two) Times a Day. 1 by mouth three times daily), Disp: 90 tablet, Rfl: 3  •  cyclobenzaprine (FLEXERIL) 10 MG tablet, Take 1 tablet by mouth 3 (Three) Times a Day As Needed for Muscle Spasms., Disp: 30 tablet, Rfl: 2  •  famciclovir (FAMVIR) 500 MG tablet, Take 1 tablet by mouth 3 (Three) Times a Day. (Patient taking differently: Take 500 mg by mouth As Needed.), Disp: 21 tablet, Rfl: 2  •  levothyroxine (SYNTHROID, LEVOTHROID) 175 MCG tablet, TAKE ONE TABLET BY MOUTH ONCE DAILY, Disp: 30 tablet, Rfl: 2  •  lisinopril (PRINIVIL,ZESTRIL) 5 MG tablet, Take 5 mg by mouth Daily., Disp: , Rfl:   •  lisinopril-hydrochlorothiazide (ZESTORETIC) 10-12.5 MG per tablet, Take 1 tablet by mouth Daily., Disp: 90 tablet, Rfl: 2  •  magnesium oxide (MAGOX) 400 (241.3 MG) MG tablet tablet, Take 400 mg by mouth Daily., Disp: , Rfl:   •  meloxicam (MOBIC) 15 MG tablet, Take 1 tablet by mouth Daily., Disp: 30 tablet, Rfl: 5  •  ondansetron (ZOFRAN) 4 MG tablet, Take 1 tablet by mouth Every 8 (Eight) Hours As Needed for Nausea or Vomiting., Disp: 10 tablet, Rfl: 0  •  polyethylene glycol (MIRALAX) packet, Take 17 g by mouth Daily. (Patient taking differently: Take 17 g by mouth As Needed.), Disp: 30 each, Rfl: 0  •  esomeprazole (NEXIUM) 40 MG capsule, Take 1 capsule by mouth Every Morning Before Breakfast., Disp: 30 capsule, Rfl: 3  Review of Systems  Review of Systems       Objective    /72 (BP Location: Right arm)  Pulse (!) 122  Ht 152.4 cm (60\")  Wt 100 kg (221 lb)  BMI 43.16 kg/m2  Physical Exam   Constitutional: She is oriented to person, place, and time. She appears well-developed and well-nourished. No distress.   HENT:   Head: Normocephalic and atraumatic.   Eyes: EOM are normal. Pupils " are equal, round, and reactive to light.   Neck: Normal range of motion.   Cardiovascular: Normal rate, regular rhythm and normal heart sounds.    Pulmonary/Chest: Effort normal and breath sounds normal.   Abdominal: Soft. Bowel sounds are normal. She exhibits no shifting dullness, no distension, no abdominal bruit, no ascites and no mass. There is no hepatosplenomegaly. There is tenderness. There is no rigidity, no rebound, no guarding and no CVA tenderness. No hernia. Hernia confirmed negative in the ventral area.   Obese, mild diffue   Musculoskeletal: Normal range of motion.   Neurological: She is alert and oriented to person, place, and time.   Skin: Skin is warm and dry.   Psychiatric: She has a normal mood and affect. Her behavior is normal. Judgment and thought content normal.   Nursing note and vitals reviewed.    Assessment/Plan      1. Gastroesophageal reflux disease with esophagitis    2. Constipation, unspecified constipation type    3. Fatty liver    .   Argenis was seen today for nausea, heartburn and constipation.    Diagnoses and all orders for this visit:    Gastroesophageal reflux disease with esophagitis    Constipation, unspecified constipation type    Fatty liver  Comments:  F0/S2/N1    Other orders  -     esomeprazole (NEXIUM) 40 MG capsule; Take 1 capsule by mouth Every Morning Before Breakfast.        Orders placed during this encounter include:  No orders of the defined types were placed in this encounter.      Medications prescribed:  New Medications Ordered This Visit   Medications   • esomeprazole (NEXIUM) 40 MG capsule     Sig: Take 1 capsule by mouth Every Morning Before Breakfast.     Dispense:  30 capsule     Refill:  3       Requested Prescriptions     Signed Prescriptions Disp Refills   • esomeprazole (NEXIUM) 40 MG capsule 30 capsule 3     Sig: Take 1 capsule by mouth Every Morning Before Breakfast.       Review and/or summary of lab tests, radiology, procedures, medications.  Review and summary of old records and obtaining of history. The risks and benefits of my recommendations, as well as other treatment options were discussed with the patient today. Questions were answered.    Follow-up: Return in about 6 weeks (around 3/8/2018), or if symptoms worsen or fail to improve.     * Surgery not found *      This document has been electronically signed by Viraj Polanco PA-C on January 28, 2018 2:59 PM      Results for orders placed or performed during the hospital encounter of 01/19/18   Tissue Pathology Exam - Tissue, Gastric, Antrum   Result Value Ref Range    Case Report       Surgical Pathology Report                         Case: [id number]                                  Authorizing Provider:  Samson Smith MD         Collected:           01/19/2018 02:42 PM          Ordering Location:     [facility]             Received:            01/19/2018 03:14 PM                                 [facility]                                                     Pathologist:           Nikolay Yan MD                                                         Specimens:   1) - Gastric, Antrum                                                                                2) - Esophagus, Distal                                                                     Final Diagnosis       1.  GASTRIC ANTRUM, MUCOSAL BIOPSY:   MILD CHRONIC GASTRITIS.   NO EVIDENCE OF HELICOBACTER PYLORI.     2.  DISTAL ESOPHAGUS, MUCOSAL BIOPSY:   SEGMENTS OF MILDLY INFLAMED STRATIFIED SQUAMOUS EPITHELIUM.        Gross Description       In 2 containers, each of these show mucosal biopsies measuring up to 0.3 cm in greatest dimension.  Embedded accordingly:  1A antrum, 2A distal esophagus.         Embedded Images     Results for orders placed or performed in visit on 11/30/17   VERDIN Fibrosure   Result Value Ref Range    Fibrosis Score (References) 0.17 0.00 - 0.21    Fibrosis Stage (Reference) Comment      Steatosis Score (Reference) 0.59 (H) 0.00 - 0.30    Steatosis Grade (Reference) Comment     VERDIN Score (Reference) 0.50 0.25    Verdin Grade (Reference) Comment     Height: (Reference) 60 Inches    Weight: (Reference) 220 LBS    Alpha 2-Macroglobulins, Qn 221 110 - 276 mg/dL    Haptoglobin 223 (H) 34 - 200 mg/dL    Apolipoprotein A-1 162 116 - 209 mg/dL    Total Bilirubin 0.6 0.0 - 1.2 mg/dL    GGT 25 0 - 60 IU/L    ALT (SGPT) 26 0 - 40 IU/L    AST (SGOT) P5P (Reference) 35 0 - 40 IU/L    Cholesterol, Total (Reference) 201 (H) 100 - 199 mg/dL    Glucose, Serum (Reference) 99 65 - 99 mg/dL    Triglycerides 227 (H) 0 - 149 mg/dL    Interpretations: (Reference) Comment     Fibrosis Scoring: Comment     Steatosis Grading (Reference) Comment     Verdin Scoring (Reference) Comment     Limitations: (Reference) Comment     Comment (Reference) Comment    Hepatitis Panel, Acute   Result Value Ref Range    Hepatitis C Ab Negative Negative    Hep A IgM Negative Negative    Hep B C IgM Negative Negative    Hepatitis B Surface Ag Negative Negative   Protime-INR   Result Value Ref Range    Protime 11.8 11.1 - 15.3 Seconds    INR 0.88 0.80 - 1.20   Hepatic Function Panel   Result Value Ref Range    Total Protein 8.1 6.3 - 8.6 g/dL    Albumin 4.70 3.40 - 4.80 g/dL    ALT (SGPT) 33 9 - 52 U/L    AST (SGOT) 50 (H) 14 - 36 U/L    Alkaline Phosphatase 70 38 - 126 U/L    Total Bilirubin 1.2 0.2 - 1.3 mg/dL    Bilirubin, Direct 0.0 0.0 - 0.3 mg/dL    Bilirubin, Indirect 0.6 0.0 - 1.1 mg/dL   Results for orders placed or performed in visit on 10/25/17   TSH   Result Value Ref Range    TSH 0.480 0.460 - 4.680 mIU/mL   T4, free   Result Value Ref Range    Free T4 1.65 0.78 - 2.19 ng/dL   Results for orders placed or performed during the hospital encounter of 08/19/17   POCT Urinalysis (automated dipstick)   Result Value Ref Range    Color Straw Yellow, Straw, Dark Yellow, Renetta    Clarity, UA Clear Clear    Glucose, UA Negative Negative, 1000  mg/dL (3+) mg/dL    Bilirubin Negative Negative    Ketones, UA Negative Negative    Specific Gravity  1.005 1.005 - 1.030    Blood, UA Negative Negative    pH, Urine 6.0 5.0 - 8.0    Protein, POC Negative Negative mg/dL    Urobilinogen, UA Normal Normal    Leukocytes Negative Negative    Nitrite, UA Negative Negative   CBC Auto Differential   Result Value Ref Range    WBC 10.32 (H) 3.20 - 9.80 10*3/mm3    RBC 5.40 (H) 3.77 - 5.16 10*6/mm3    Hemoglobin 17.2 (H) 12.0 - 15.5 g/dL    Hematocrit 49.8 (H) 35.0 - 45.0 %    MCV 92.2 80.0 - 98.0 fL    MCH 31.9 26.5 - 34.0 pg    MCHC 34.5 31.4 - 36.0 g/dL    RDW 13.2 11.5 - 14.5 %    RDW-SD 44.2 36.4 - 46.3 fl    MPV 10.3 8.0 - 12.0 fL    Platelets 290 150 - 450 10*3/mm3    Neutrophil % 61.9 37.0 - 80.0 %    Lymphocyte % 29.4 10.0 - 50.0 %    Monocyte % 6.5 0.0 - 12.0 %    Eosinophil % 1.7 0.0 - 7.0 %    Basophil % 0.2 0.0 - 2.0 %    Immature Grans % 0.3 0.0 - 0.5 %    Neutrophils, Absolute 6.39 2.00 - 8.60 10*3/mm3    Lymphocytes, Absolute 3.03 0.60 - 4.20 10*3/mm3    Monocytes, Absolute 0.67 0.00 - 0.90 10*3/mm3    Eosinophils, Absolute 0.18 0.00 - 0.70 10*3/mm3    Basophils, Absolute 0.02 0.00 - 0.20 10*3/mm3    Immature Grans, Absolute 0.03 (H) 0.00 - 0.02 10*3/mm3   Lipase   Result Value Ref Range    Lipase 132 23 - 300 U/L   Amylase   Result Value Ref Range    Amylase 53 50 - 130 U/L   Comprehensive Metabolic Panel   Result Value Ref Range    Glucose 110 (H) 60 - 100 mg/dL    BUN 12 7 - 21 mg/dL    Creatinine 0.99 0.50 - 1.00 mg/dL    Sodium 140 137 - 145 mmol/L    Potassium 4.1 3.5 - 5.1 mmol/L    Chloride 99 95 - 110 mmol/L    CO2 27.0 22.0 - 31.0 mmol/L    Calcium 9.8 8.4 - 10.2 mg/dL    Total Protein 8.2 6.3 - 8.6 g/dL    Albumin 5.20 (H) 3.40 - 4.80 g/dL    ALT (SGPT) 54 (H) 9 - 52 U/L    AST (SGOT) 50 (H) 14 - 36 U/L    Alkaline Phosphatase 74 38 - 126 U/L    Total Bilirubin 1.6 (H) 0.2 - 1.3 mg/dL    eGFR Non  Amer 58 51 - 120 mL/min/1.73    Globulin 3.0  2.3 - 3.5 gm/dL    A/G Ratio 1.7 1.1 - 1.8 g/dL    BUN/Creatinine Ratio 12.1 7.0 - 25.0    Anion Gap 14.0 5.0 - 15.0 mmol/L     *Note: Due to a large number of results and/or encounters for the requested time period, some results have not been displayed. A complete set of results can be found in Results Review.       Some portions of this note have been dictated using voice recognition software and may contain errors and/or omissions.

## 2018-04-02 ENCOUNTER — OFFICE VISIT (OUTPATIENT)
Dept: FAMILY MEDICINE CLINIC | Facility: CLINIC | Age: 58
End: 2018-04-02

## 2018-04-02 VITALS
HEART RATE: 100 BPM | OXYGEN SATURATION: 99 % | SYSTOLIC BLOOD PRESSURE: 122 MMHG | DIASTOLIC BLOOD PRESSURE: 82 MMHG | HEIGHT: 60 IN | BODY MASS INDEX: 42.41 KG/M2 | WEIGHT: 216 LBS

## 2018-04-02 DIAGNOSIS — Z63.9 FAMILY PROBLEMS: ICD-10-CM

## 2018-04-02 DIAGNOSIS — F32.9 REACTIVE DEPRESSION: ICD-10-CM

## 2018-04-02 DIAGNOSIS — I10 ESSENTIAL HYPERTENSION: Primary | ICD-10-CM

## 2018-04-02 DIAGNOSIS — Z71.89 COUNSELING FOR BEREAVEMENT: ICD-10-CM

## 2018-04-02 DIAGNOSIS — H92.03 OTALGIA OF BOTH EARS: ICD-10-CM

## 2018-04-02 PROCEDURE — 99214 OFFICE O/P EST MOD 30 MIN: CPT | Performed by: FAMILY MEDICINE

## 2018-04-02 SDOH — SOCIAL STABILITY - SOCIAL INSECURITY: PROBLEM RELATED TO PRIMARY SUPPORT GROUP, UNSPECIFIED: Z63.9

## 2018-04-02 NOTE — PROGRESS NOTES
Subjective   Argenis Blair is a 58 y.o. female. She is here for depression follow up. Her PHQ-9 is 11. She is concerned that the police are no going to catch her son's killer. An investigation is still underway. Her ears hurt    Depression   Visit Type: follow-up (PHQ-9 is 11)  Patient presents with the following symptoms: anhedonia, decreased concentration, excessive worry, fatigue, insomnia, muscle tension, nervousness/anxiety and restlessness.  Patient is not experiencing: thoughts of death and weight gain.  Frequency of symptoms: constantly   Severity: moderate   Sleep quality: poor  Nighttime awakenings: occasional  Compliance with medications:  %        Earache    There is pain in both ears. This is a new problem. The current episode started in the past 7 days. The problem occurs constantly. The problem has been unchanged. There has been no fever. The fever has been present for less than 1 day. The pain is at a severity of 4/10. The pain is mild. Pertinent negatives include no abdominal pain, coughing, diarrhea, ear discharge, headaches, hearing loss, neck pain, rash, rhinorrhea, sore throat or vomiting. She has tried nothing for the symptoms. The treatment provided no relief. There is no history of a chronic ear infection or a tympanostomy tube.        The following portions of the patient's history were reviewed and updated as appropriate: allergies, current medications, past family history, past medical history, past social history, past surgical history and problem list.    Review of Systems   Constitutional: Negative for unexpected weight gain.   HENT: Positive for ear pain. Negative for ear discharge, hearing loss, rhinorrhea and sore throat.    Respiratory: Negative for cough.    Gastrointestinal: Negative for abdominal pain, diarrhea and vomiting.   Musculoskeletal: Negative for neck pain.   Skin: Negative for rash.   Neurological: Negative for headaches.   Psychiatric/Behavioral: Positive for  decreased concentration. The patient is nervous/anxious and has insomnia.        Objective   Physical Exam   Constitutional: She is oriented to person, place, and time. She appears well-developed and well-nourished. No distress.   HENT:   Head: Normocephalic and atraumatic.   Eyes: EOM are normal.   Neck: Normal range of motion. No tracheal deviation present. No thyromegaly present.   Cardiovascular: Normal rate, regular rhythm and normal heart sounds.  Exam reveals no gallop and no friction rub.    No murmur heard.  Pulmonary/Chest: Effort normal and breath sounds normal.   Abdominal: Soft. She exhibits no distension and no mass. There is no tenderness. There is no rebound and no guarding. No hernia.   Musculoskeletal: Normal range of motion. She exhibits no edema, tenderness or deformity.   Neurological: She is alert and oriented to person, place, and time.   Skin: Skin is warm and dry. She is not diaphoretic. No erythema.   Psychiatric: Judgment and thought content normal. Her mood appears anxious. Her speech is rapid and/or pressured. She is agitated. Cognition and memory are not impaired. She does not express inappropriate judgment. She expresses no suicidal plans and no homicidal plans. She exhibits normal recent memory and normal remote memory.   Tearful.   Nursing note and vitals reviewed.        Assessment/Plan   Argenis was seen today for depression and fibromyalgia.    Diagnoses and all orders for this visit:    Essential hypertension    Family problems    Reactive depression    Counseling for bereavement    Otalgia of both ears    Continue Wellbutrin.    Refused counseling    Follow up in 30 days.          This document has been electronically signed by Enoc Lange DO on April 2, 2018 3:56 PM

## 2018-04-09 DIAGNOSIS — E06.3 HYPOTHYROIDISM DUE TO HASHIMOTO'S THYROIDITIS: ICD-10-CM

## 2018-04-09 DIAGNOSIS — E03.8 HYPOTHYROIDISM DUE TO HASHIMOTO'S THYROIDITIS: ICD-10-CM

## 2018-04-09 RX ORDER — LEVOTHYROXINE SODIUM 175 UG/1
TABLET ORAL
Qty: 30 TABLET | Refills: 2 | Status: SHIPPED | OUTPATIENT
Start: 2018-04-09 | End: 2018-07-03 | Stop reason: SDUPTHER

## 2018-07-03 ENCOUNTER — OFFICE VISIT (OUTPATIENT)
Dept: FAMILY MEDICINE CLINIC | Facility: CLINIC | Age: 58
End: 2018-07-03

## 2018-07-03 VITALS
DIASTOLIC BLOOD PRESSURE: 86 MMHG | HEART RATE: 109 BPM | BODY MASS INDEX: 43.51 KG/M2 | HEIGHT: 60 IN | SYSTOLIC BLOOD PRESSURE: 141 MMHG | WEIGHT: 221.6 LBS | OXYGEN SATURATION: 97 %

## 2018-07-03 DIAGNOSIS — I10 ESSENTIAL HYPERTENSION: ICD-10-CM

## 2018-07-03 DIAGNOSIS — M79.7 FIBROMYALGIA: ICD-10-CM

## 2018-07-03 DIAGNOSIS — F32.9 REACTIVE DEPRESSION: Primary | ICD-10-CM

## 2018-07-03 DIAGNOSIS — E06.3 HYPOTHYROIDISM DUE TO HASHIMOTO'S THYROIDITIS: ICD-10-CM

## 2018-07-03 DIAGNOSIS — Z12.31 ENCOUNTER FOR SCREENING MAMMOGRAM FOR BREAST CANCER: ICD-10-CM

## 2018-07-03 DIAGNOSIS — E03.8 HYPOTHYROIDISM DUE TO HASHIMOTO'S THYROIDITIS: ICD-10-CM

## 2018-07-03 DIAGNOSIS — F43.21 GRIEF REACTION: ICD-10-CM

## 2018-07-03 DIAGNOSIS — M81.0 OSTEOPOROSIS WITHOUT CURRENT PATHOLOGICAL FRACTURE, UNSPECIFIED OSTEOPOROSIS TYPE: ICD-10-CM

## 2018-07-03 PROCEDURE — 99213 OFFICE O/P EST LOW 20 MIN: CPT | Performed by: FAMILY MEDICINE

## 2018-07-03 RX ORDER — LOSARTAN POTASSIUM AND HYDROCHLOROTHIAZIDE 12.5; 5 MG/1; MG/1
1 TABLET ORAL DAILY
Qty: 30 TABLET | Refills: 1 | Status: SHIPPED | OUTPATIENT
Start: 2018-07-03 | End: 2018-09-05 | Stop reason: SDUPTHER

## 2018-07-03 RX ORDER — LEVOTHYROXINE SODIUM 175 UG/1
TABLET ORAL
Qty: 30 TABLET | Refills: 2 | Status: SHIPPED | OUTPATIENT
Start: 2018-07-03 | End: 2018-09-19 | Stop reason: SDUPTHER

## 2018-07-03 RX ORDER — DULOXETIN HYDROCHLORIDE 30 MG/1
30 CAPSULE, DELAYED RELEASE ORAL DAILY
Qty: 30 CAPSULE | Refills: 1 | Status: SHIPPED | OUTPATIENT
Start: 2018-07-03 | End: 2018-09-01 | Stop reason: SDUPTHER

## 2018-07-03 NOTE — PROGRESS NOTES
Subjective   Argenis Blair is a 58 y.o. female.     Depression  Patients sone was murdered 15 months ago and patient feels that her grief is normal. She reports that she feels she has not been allowed to fully grieve because of her other family responsibilities caring for her mother and grandchild. She is seeing a group therapy for grief counseling. She was previously started on Wellbutrin but have severe oral discomfort and she titrated herself off this medication. Reports that she has also been on pristique and has been told that she had depression in the past but she does not agree. She had parathyroid disease two years ago with psychiatric symptoms and severe bone pain.    PHQ9: 9    Total Score Depression Severity  1-4 Minimal depression  5-9 Mild depression  10-14 Moderate depression  15-19 Moderately severe depression  20-27 Severe depression        Cough   This is a chronic problem. The current episode started more than 1 month ago. The problem has been unchanged. The cough is non-productive. Associated symptoms include myalgias. Pertinent negatives include no chest pain, ear pain, fever, headaches, rash, sore throat or shortness of breath.        The following portions of the patient's history were reviewed and updated as appropriate: allergies, current medications, past family history, past medical history, past social history, past surgical history and problem list.    Social History     Social History   • Marital status:      Spouse name: N/A   • Number of children: N/A   • Years of education: N/A     Occupational History   • Not on file.     Social History Main Topics   • Smoking status: Never Smoker   • Smokeless tobacco: Never Used   • Alcohol use No   • Drug use: Unknown   • Sexual activity: Defer     Other Topics Concern   • Not on file     Social History Narrative   • No narrative on file       Immunization History   Administered Date(s) Administered   • Flu Vaccine Quad PF >36MO  11/09/2017   • Tdap 11/09/2017        Current Outpatient Prescriptions on File Prior to Visit   Medication Sig Dispense Refill   • cyclobenzaprine (FLEXERIL) 10 MG tablet Take 1 tablet by mouth 3 (Three) Times a Day As Needed for Muscle Spasms. 30 tablet 2   • esomeprazole (NEXIUM) 40 MG capsule Take 1 capsule by mouth Every Morning Before Breakfast. 30 capsule 3   • famciclovir (FAMVIR) 500 MG tablet Take 1 tablet by mouth 3 (Three) Times a Day. (Patient taking differently: Take 500 mg by mouth As Needed.) 21 tablet 2   • magnesium oxide (MAGOX) 400 (241.3 MG) MG tablet tablet Take 400 mg by mouth Daily.     • meloxicam (MOBIC) 15 MG tablet Take 1 tablet by mouth Daily. 30 tablet 5   • ondansetron (ZOFRAN) 4 MG tablet Take 1 tablet by mouth Every 8 (Eight) Hours As Needed for Nausea or Vomiting. 10 tablet 0   • polyethylene glycol (MIRALAX) packet Take 17 g by mouth Daily. (Patient taking differently: Take 17 g by mouth As Needed.) 30 each 0   • [DISCONTINUED] levothyroxine (SYNTHROID, LEVOTHROID) 175 MCG tablet TAKE ONE TABLET BY MOUTH ONCE DAILY 30 tablet 2   • [DISCONTINUED] lisinopril (PRINIVIL,ZESTRIL) 5 MG tablet Take 5 mg by mouth Daily.     • [DISCONTINUED] lisinopril-hydrochlorothiazide (ZESTORETIC) 10-12.5 MG per tablet Take 1 tablet by mouth Daily. 90 tablet 2   • [DISCONTINUED] buPROPion SR (WELLBUTRIN SR) 150 MG 12 hr tablet 1 by mouth three times daily (Patient taking differently: Take  by mouth 2 (Two) Times a Day. 1 by mouth three times daily) 90 tablet 3     No current facility-administered medications on file prior to visit.        Review of Systems   Constitutional: Negative for activity change, appetite change, fatigue and fever.   HENT: Negative for ear pain and sore throat.    Eyes: Negative for pain and visual disturbance.   Respiratory: Negative for cough and shortness of breath.    Cardiovascular: Negative for chest pain and palpitations.   Gastrointestinal: Negative for abdominal pain and  nausea.   Endocrine: Negative for cold intolerance and heat intolerance.   Genitourinary: Negative for difficulty urinating and dysuria.   Musculoskeletal: Positive for myalgias. Negative for arthralgias and gait problem.   Skin: Negative for color change and rash.   Neurological: Negative for dizziness, weakness and headaches.   Hematological: Negative for adenopathy. Does not bruise/bleed easily.   Psychiatric/Behavioral: Positive for agitation, confusion, decreased concentration and dysphoric mood. Negative for behavioral problems, hallucinations, self-injury, sleep disturbance and suicidal ideas. The patient is not nervous/anxious and is not hyperactive.        Objective   Physical Exam   Constitutional: She is oriented to person, place, and time. She appears well-developed and well-nourished.   HENT:   Head: Normocephalic and atraumatic.   Eyes: Conjunctivae, EOM and lids are normal. Pupils are equal, round, and reactive to light.   Neck: Normal range of motion. Neck supple.   Cardiovascular: Normal rate, regular rhythm and normal heart sounds.  Exam reveals no gallop and no friction rub.    No murmur heard.  Pulmonary/Chest: Effort normal and breath sounds normal.   Abdominal: Soft. Normal appearance and bowel sounds are normal. There is no tenderness.   Musculoskeletal: Normal range of motion. She exhibits tenderness (generalized to touch everywhere).   Neurological: She is alert and oriented to person, place, and time.   Skin: Skin is warm, dry and intact.   Psychiatric: Her behavior is normal. Judgment and thought content normal. Her affect is labile. Her speech is tangential. Cognition and memory are normal. She exhibits a depressed mood. She expresses no suicidal plans and no homicidal plans.       Lab Results (most recent)     None          Vitals:    07/03/18 0902   BP: 141/86   Pulse: 109   SpO2: 97%       Assessment/Plan   Argenis was seen today for hypertension and depression.    Diagnoses and all  orders for this visit:    Reactive depression  -     DULoxetine (CYMBALTA) 30 MG capsule; Take 1 capsule by mouth Daily.    Osteoporosis without current pathological fracture, unspecified osteoporosis type  -     DEXA Bone Density Axial    Encounter for screening mammogram for breast cancer  -     Mammo Screening Bilateral With CAD; Future    Grief reaction  -     DULoxetine (CYMBALTA) 30 MG capsule; Take 1 capsule by mouth Daily.    Fibromyalgia  -     DULoxetine (CYMBALTA) 30 MG capsule; Take 1 capsule by mouth Daily.    Essential hypertension  -     losartan-hydrochlorothiazide (HYZAAR) 50-12.5 MG per tablet; Take 1 tablet by mouth Daily.               GOALS:  Goals     • Less pain          BARRIERS TO GOALS:  Patient is resistant to antidepressant medications, or acknowledging that crying randomly throughout the day is not normal.    Preventative:  Female Preventative: Mammogram is due  DEXA is due  Patient's Body mass index is 43.28 kg/m². BMI is above normal parameters. Recommendations include: exercise counseling and nutrition counseling.    delayed   Recommended:Shingrix  never smoker  does not drink  eat more fruits and vegetables, decrease soda or juice intake, increase water intake and increase physical activity    RISK SCORE: 4        This document has been electronically signed by Melisa Valente MD on July 3, 2018 1:43 PM

## 2018-07-05 NOTE — PROGRESS NOTES
I have reviewed the notes, assessments, and/or procedures performed by Dr. Valente, I concur with her/his documentation and assessment and plan for Argenis Blair.       This document has been electronically signed by Todd Centeno MD on July 5, 2018 2:29 PM

## 2018-08-14 ENCOUNTER — LAB (OUTPATIENT)
Dept: LAB | Facility: HOSPITAL | Age: 58
End: 2018-08-14

## 2018-08-14 ENCOUNTER — OFFICE VISIT (OUTPATIENT)
Dept: FAMILY MEDICINE CLINIC | Facility: CLINIC | Age: 58
End: 2018-08-14

## 2018-08-14 VITALS
OXYGEN SATURATION: 97 % | DIASTOLIC BLOOD PRESSURE: 72 MMHG | HEIGHT: 60 IN | BODY MASS INDEX: 42.65 KG/M2 | WEIGHT: 217.25 LBS | HEART RATE: 91 BPM | SYSTOLIC BLOOD PRESSURE: 120 MMHG

## 2018-08-14 DIAGNOSIS — M85.80 OSTEOPENIA, UNSPECIFIED LOCATION: ICD-10-CM

## 2018-08-14 DIAGNOSIS — Z00.00 ANNUAL VISIT FOR GENERAL ADULT MEDICAL EXAMINATION WITHOUT ABNORMAL FINDINGS: ICD-10-CM

## 2018-08-14 DIAGNOSIS — E03.8 HYPOTHYROIDISM DUE TO HASHIMOTO'S THYROIDITIS: Primary | ICD-10-CM

## 2018-08-14 DIAGNOSIS — E06.3 HYPOTHYROIDISM DUE TO HASHIMOTO'S THYROIDITIS: Primary | ICD-10-CM

## 2018-08-14 LAB
ALBUMIN SERPL-MCNC: 4.6 G/DL (ref 3.4–4.8)
ALBUMIN/GLOB SERPL: 1.5 G/DL (ref 1.1–1.8)
ALP SERPL-CCNC: 57 U/L (ref 38–126)
ALT SERPL W P-5'-P-CCNC: 25 U/L (ref 9–52)
ANION GAP SERPL CALCULATED.3IONS-SCNC: 10 MMOL/L (ref 5–15)
AST SERPL-CCNC: 36 U/L (ref 14–36)
BASOPHILS # BLD AUTO: 0.03 10*3/MM3 (ref 0–0.2)
BASOPHILS NFR BLD AUTO: 0.3 % (ref 0–2)
BILIRUB SERPL-MCNC: 1.1 MG/DL (ref 0.2–1.3)
BUN BLD-MCNC: 15 MG/DL (ref 7–21)
BUN/CREAT SERPL: 19.7 (ref 7–25)
CALCIUM SPEC-SCNC: 9.1 MG/DL (ref 8.4–10.2)
CHLORIDE SERPL-SCNC: 101 MMOL/L (ref 95–110)
CO2 SERPL-SCNC: 29 MMOL/L (ref 22–31)
CREAT BLD-MCNC: 0.76 MG/DL (ref 0.5–1)
DEPRECATED RDW RBC AUTO: 42 FL (ref 36.4–46.3)
EOSINOPHIL # BLD AUTO: 0.18 10*3/MM3 (ref 0–0.7)
EOSINOPHIL NFR BLD AUTO: 2.1 % (ref 0–7)
ERYTHROCYTE [DISTWIDTH] IN BLOOD BY AUTOMATED COUNT: 13.1 % (ref 11.5–14.5)
GFR SERPL CREATININE-BSD FRML MDRD: 78 ML/MIN/1.73 (ref 51–120)
GLOBULIN UR ELPH-MCNC: 3.1 GM/DL (ref 2.3–3.5)
GLUCOSE BLD-MCNC: 106 MG/DL (ref 60–100)
HCT VFR BLD AUTO: 42.9 % (ref 35–45)
HGB BLD-MCNC: 15 G/DL (ref 12–15.5)
IMM GRANULOCYTES # BLD: 0.04 10*3/MM3 (ref 0–0.02)
IMM GRANULOCYTES NFR BLD: 0.5 % (ref 0–0.5)
LYMPHOCYTES # BLD AUTO: 2.23 10*3/MM3 (ref 0.6–4.2)
LYMPHOCYTES NFR BLD AUTO: 25.5 % (ref 10–50)
MCH RBC QN AUTO: 30.8 PG (ref 26.5–34)
MCHC RBC AUTO-ENTMCNC: 35 G/DL (ref 31.4–36)
MCV RBC AUTO: 88.1 FL (ref 80–98)
MONOCYTES # BLD AUTO: 0.62 10*3/MM3 (ref 0–0.9)
MONOCYTES NFR BLD AUTO: 7.1 % (ref 0–12)
NEUTROPHILS # BLD AUTO: 5.64 10*3/MM3 (ref 2–8.6)
NEUTROPHILS NFR BLD AUTO: 64.5 % (ref 37–80)
PLATELET # BLD AUTO: 275 10*3/MM3 (ref 150–450)
PMV BLD AUTO: 10.4 FL (ref 8–12)
POTASSIUM BLD-SCNC: 3.7 MMOL/L (ref 3.5–5.1)
PROT SERPL-MCNC: 7.7 G/DL (ref 6.3–8.6)
RBC # BLD AUTO: 4.87 10*6/MM3 (ref 3.77–5.16)
SODIUM BLD-SCNC: 140 MMOL/L (ref 137–145)
T4 FREE SERPL-MCNC: 1.77 NG/DL (ref 0.78–2.19)
TSH SERPL DL<=0.05 MIU/L-ACNC: <0.02 MIU/ML (ref 0.46–4.68)
WBC NRBC COR # BLD: 8.74 10*3/MM3 (ref 3.2–9.8)

## 2018-08-14 PROCEDURE — 84439 ASSAY OF FREE THYROXINE: CPT | Performed by: FAMILY MEDICINE

## 2018-08-14 PROCEDURE — 84443 ASSAY THYROID STIM HORMONE: CPT | Performed by: FAMILY MEDICINE

## 2018-08-14 PROCEDURE — 80053 COMPREHEN METABOLIC PANEL: CPT | Performed by: FAMILY MEDICINE

## 2018-08-14 PROCEDURE — 85025 COMPLETE CBC W/AUTO DIFF WBC: CPT | Performed by: FAMILY MEDICINE

## 2018-08-14 PROCEDURE — 36415 COLL VENOUS BLD VENIPUNCTURE: CPT | Performed by: FAMILY MEDICINE

## 2018-08-14 PROCEDURE — 99213 OFFICE O/P EST LOW 20 MIN: CPT | Performed by: FAMILY MEDICINE

## 2018-08-14 PROCEDURE — 82652 VIT D 1 25-DIHYDROXY: CPT | Performed by: FAMILY MEDICINE

## 2018-08-14 PROCEDURE — 83970 ASSAY OF PARATHORMONE: CPT | Performed by: FAMILY MEDICINE

## 2018-08-14 NOTE — PROGRESS NOTES
Subjective:     Argenis Blair is a 58 y.o. female who presents for follow up for depression and grief reaction    Patient has had depression for several years after the death of her son. We started her on duloxetine at her last visit 6 weeks ago. Patient reports she initially had side effects of fatigue, dizziness, and poor appetite but those issues resolved. She is now feeling well except will occasionally find herself tensing for no reason and have to force herself to relax. She reports that she has had decreased anhedonia, improved sleep quality, and is seeing a , continues her group therapy sessions, and has begun to reconnect with old friends. She is still emotionally labile and easily becomes tearful in office; she does not wish to increase her Cymbalta at this time but would consider potentially in the future.    PHQ9: 4    Total Score Depression Severity  1-4 Minimal depression  5-9 Mild depression  10-14 Moderate depression  15-19 Moderately severe depression  20-27 Severe depression      Patient was concerned about her last DEXA scan, which demonstrated mild improvement from prior. Patient reports that she is already taking a calcium supplement. She has a history of parathyroid tumor that was previously excised. We discussed follow up labs for monitoring especially given history of thyroid disease and osteopenia.    Past Medical Hx:  Past Medical History:   Diagnosis Date   • Allergy to bee sting    • Essential (primary) hypertension    • Fibrocystic breast    • Hashimoto's thyroiditis    • Hypertensive left ventricular hypertrophy    • Osteoporosis    • PONV (postoperative nausea and vomiting)    • Primary hyperparathyroidism (CMS/HCC)     s/p parathyroidectomy      • Vitamin D deficiency        Past Surgical Hx:  Past Surgical History:   Procedure Laterality Date   • BLADDER SURGERY  2004   • CHOLECYSTECTOMY  2002   • COLONOSCOPY N/A 1/19/2018    Procedure: COLONOSCOPY;  Surgeon: Samson NOGUERA  MD Luis;  Location: [facility] ENDOSCOPY;  Service:    • DILATATION AND CURETTAGE  1986   • ENDOSCOPY N/A 1/19/2018    Procedure: ESOPHAGOGASTRODUODENOSCOPY--before follow up;  Surgeon: Samson Smith MD;  Location: [facility] ENDOSCOPY;  Service:    • HYSTERECTOMY  2004   • PARATHYROID GLAND SURGERY     • TUBAL ABDOMINAL LIGATION  1988   • UPPER GASTROINTESTINAL ENDOSCOPY  01/19/2018       Health Maintenance:  Health Maintenance   Topic Date Due   • ANNUAL PHYSICAL  02/01/1963   • ZOSTER VACCINE (1 of 2) 02/01/2010   • PAP SMEAR  06/22/2017   • INFLUENZA VACCINE  08/01/2018   • MAMMOGRAM  07/27/2020   • DXA SCAN  07/27/2020   • COLONOSCOPY  01/19/2023   • TDAP/TD VACCINES (2 - Td) 11/09/2027   • HEPATITIS C SCREENING  Completed       Current Meds:    Current Outpatient Prescriptions:   •  cyclobenzaprine (FLEXERIL) 10 MG tablet, Take 1 tablet by mouth 3 (Three) Times a Day As Needed for Muscle Spasms., Disp: 30 tablet, Rfl: 2  •  DULoxetine (CYMBALTA) 30 MG capsule, Take 1 capsule by mouth Daily., Disp: 30 capsule, Rfl: 1  •  esomeprazole (NEXIUM) 40 MG capsule, Take 1 capsule by mouth Every Morning Before Breakfast., Disp: 30 capsule, Rfl: 3  •  famciclovir (FAMVIR) 500 MG tablet, Take 1 tablet by mouth 3 (Three) Times a Day. (Patient taking differently: Take 500 mg by mouth As Needed.), Disp: 21 tablet, Rfl: 2  •  levothyroxine (SYNTHROID, LEVOTHROID) 175 MCG tablet, TAKE 1 TABLET BY MOUTH ONCE DAILY, Disp: 30 tablet, Rfl: 2  •  losartan-hydrochlorothiazide (HYZAAR) 50-12.5 MG per tablet, Take 1 tablet by mouth Daily., Disp: 30 tablet, Rfl: 1  •  magnesium oxide (MAGOX) 400 (241.3 MG) MG tablet tablet, Take 400 mg by mouth Daily., Disp: , Rfl:   •  meloxicam (MOBIC) 15 MG tablet, Take 1 tablet by mouth Daily., Disp: 30 tablet, Rfl: 5  •  ondansetron (ZOFRAN) 4 MG tablet, Take 1 tablet by mouth Every 8 (Eight) Hours As Needed for Nausea or Vomiting., Disp: 10 tablet, Rfl: 0  •  polyethylene glycol (MIRALAX) packet,  Take 17 g by mouth Daily. (Patient taking differently: Take 17 g by mouth As Needed.), Disp: 30 each, Rfl: 0    Allergies:  Percocet [oxycodone-acetaminophen]; Claritin [loratadine]; Keflex [cephalexin]; and Sulfa antibiotics    Family Hx:  Family History   Problem Relation Age of Onset   • Diabetes Mother    • Heart disease Mother    • Heart disease Father    • Heart disease Brother    • Cancer Other    • Breast cancer Other    • Hypertension Other    • Lung cancer Other    • Lung disease Other    • Breast cancer Maternal Aunt    • Breast cancer Paternal Aunt         Social History:  Social History     Social History   • Marital status:      Spouse name: N/A   • Number of children: N/A   • Years of education: N/A     Occupational History   • Not on file.     Social History Main Topics   • Smoking status: Never Smoker   • Smokeless tobacco: Never Used   • Alcohol use No   • Drug use: Unknown   • Sexual activity: Defer     Other Topics Concern   • Not on file     Social History Narrative   • No narrative on file       Review of Systems  Review of Systems   Constitutional: Negative for activity change, appetite change, fatigue and fever.   HENT: Negative for ear pain and sore throat.    Eyes: Negative for pain and visual disturbance.   Respiratory: Negative for cough and shortness of breath.    Cardiovascular: Negative for chest pain and palpitations.   Gastrointestinal: Negative for abdominal pain and nausea.   Endocrine: Negative for cold intolerance and heat intolerance.   Genitourinary: Negative for difficulty urinating and dysuria.   Musculoskeletal: Negative for arthralgias and gait problem.   Skin: Negative for color change and rash.   Neurological: Positive for headaches. Negative for dizziness and weakness.   Hematological: Negative for adenopathy. Does not bruise/bleed easily.   Psychiatric/Behavioral: Negative for agitation, confusion and sleep disturbance.       Objective:     /72 (BP  "Location: Left arm, Patient Position: Sitting, Cuff Size: Large Adult)   Pulse 91   Ht 152.4 cm (60\")   Wt 98.5 kg (217 lb 4 oz)   SpO2 97%   BMI 42.43 kg/m²     Physical Exam   Constitutional: She is oriented to person, place, and time. She appears well-developed and well-nourished.   HENT:   Head: Normocephalic and atraumatic.   Eyes: Pupils are equal, round, and reactive to light. Conjunctivae, EOM and lids are normal.   Neck: Normal range of motion. Neck supple.   Cardiovascular: Normal rate, regular rhythm and normal heart sounds.  Exam reveals no gallop and no friction rub.    No murmur heard.  Pulmonary/Chest: Effort normal and breath sounds normal.   Abdominal: Soft. Normal appearance and bowel sounds are normal. There is no tenderness.   Musculoskeletal: Normal range of motion.   Neurological: She is alert and oriented to person, place, and time.   Skin: Skin is warm, dry and intact.   Psychiatric: Her speech is normal and behavior is normal. Judgment and thought content normal. Her affect is labile. Cognition and memory are normal. She exhibits a depressed mood.        Visual Acuity Screening    Right eye Left eye Both eyes   Without correction:      With correction: 20/50 20/40 20/40     Assessment/Plan:     Argenis was seen today for depression.    Diagnoses and all orders for this visit:    Hypothyroidism due to Hashimoto's thyroiditis  -     TSH  -     T4, free    Osteopenia, unspecified location  -     Vitamin D 1,25 Dihydroxy    Annual visit for general adult medical examination without abnormal findings  -     CBC Auto Differential  -     Comprehensive Metabolic Panel  -     PTH, Intact & Calcium           Follow-up:     Return in about 6 months (around 2/14/2019).      Goals     • Less pain           Preventative:    Vaccines Recommended at this visit:   Shingrix    Screenings Recommended at this visit:  No Screenings offered today. Patient is up to date on all screenings at this time. "     Smoking Status:  Patient has never smoked.    Alcohol Intake:  Patient does not drink    Patient's Body mass index is 42.43 kg/m². BMI is above normal parameters. Recommendations include: exercise counseling and nutrition counseling.        RISK SCORE: 4      Signature:  Melisa Valente MD [facility] PGY3  [place] [address]  Office: [phone]          This document has been electronically signed by Melisa Valente MD on August 14, 2018 10:03 AM

## 2018-08-14 NOTE — PATIENT INSTRUCTIONS
Preventing Unhealthy Weight Gain, Adult  Staying at a healthy weight is important. When fat builds up in your body, you may become overweight or obese. These conditions put you at greater risk for developing certain health problems, such as heart disease, diabetes, sleeping problems, joint problems, and some cancers.  Unhealthy weight gain is often the result of making unhealthy choices in what you eat. It is also a result of not getting enough exercise. You can make changes to your lifestyle to prevent obesity and stay as healthy as possible.  What nutrition changes can be made?  To maintain a healthy weight and prevent obesity:  · Eat only as much as your body needs. To do this:  ? Pay attention to signs that you are hungry or full. Stop eating as soon as you feel full.  ? If you feel hungry, try drinking water first. Drink enough water so your urine is clear or pale yellow.  ? Eat smaller portions.  ? Look at serving sizes on food labels. Most foods contain more than one serving per container.  ? Eat the recommended amount of calories for your gender and activity level. While most active people should eat around 2,000 calories per day, if you are trying to lose weight or are not very active, you main need to eat less calories. Talk to your health care provider or dietitian about how many calories you should eat each day.  · Choose healthy foods, such as:  ? Fruits and vegetables. Try to fill at least half of your plate at each meal with fruits and vegetables.  ? Whole grains, such as whole wheat bread, brown rice, and quinoa.  ? Lean meats, such as chicken or fish.  ? Other healthy proteins, such as beans, eggs, or tofu.  ? Healthy fats, such as nuts, seeds, fatty fish, and olive oil.  ? Low-fat or fat-free dairy.  · Check food labels and avoid food and drinks that:  ? Are high in calories.  ? Have added sugar.  ? Are high in sodium.  ? Have saturated fats or trans fats.  · Limit how much you eat of the following  foods:  ? Prepackaged meals.  ? Fast food.  ? Fried foods.  ? Processed meat, such as davis, sausage, and deli meats.  ? Fatty cuts of red meat and poultry with skin.  · Cook foods in healthier ways, such as by baking, broiling, or grilling.  · When grocery shopping, try to shop around the outside of the store. This helps you buy mostly fresh foods and avoid canned and prepackaged foods.    What lifestyle changes can be made?  · Exercise at least 30 minutes 5 or more days each week. Exercising includes brisk walking, yard work, biking, running, swimming, and team sports like basketball and soccer. Ask your health care provider which exercises are safe for you.  · Do not use any products that contain nicotine or tobacco, such as cigarettes and e-cigarettes. If you need help quitting, ask your health care provider.  · Limit alcohol intake to no more than 1 drink a day for nonpregnant women and 2 drinks a day for men. One drink equals 12 oz of beer, 5 oz of wine, or 1½ oz of hard liquor.  · Try to get 7-9 hours of sleep each night.  What other changes can be made?  · Keep a food and activity journal to keep track of:  ? What you ate and how many calories you had. Remember to count sauces, dressings, and side dishes.  ? Whether you were active, and what exercises you did.  ? Your calorie, weight, and activity goals.  · Check your weight regularly. Track any changes. If you notice you have gained weight, make changes to your diet or activity routine.  · Avoid taking weight-loss medicines or supplements. Talk to your health care provider before starting any new medicine or supplement.  · Talk to your health care provider before trying any new diet or exercise plan.  Why are these changes important?  Eating healthy, staying active, and having healthy habits not only help prevent obesity, they also:  · Help you to manage stress and emotions.  · Help you to connect with friends and family.  · Improve your  self-esteem.  · Improve your sleep.  · Prevent long-term health problems.    What can happen if changes are not made?  Being obese or overweight can cause you to develop joint or bone problems, which can make it hard for you to stay active or do activities you enjoy. Being obese or overweight also puts stress on your heart and lungs and can lead to health problems like diabetes, heart disease, and some cancers.  Where to find more information:  Talk with your health care provider or a dietitian about healthy eating and healthy lifestyle choices. You may also find other information through these resources:  · U.S. Department of Agriculture MyPlate: www.choosemyplate.gov  · American Heart Association: www.heart.org  · Centers for Disease Control and Prevention: www.cdc.gov    Summary  · Staying at a healthy weight is important. It helps prevent certain diseases and health problems, such as heart disease, diabetes, joint problems, sleep disorders, and some cancers.  · Being obese or overweight can cause you to develop joint or bone problems, which can make it hard for you to stay active or do activities you enjoy.  · You can prevent unhealthy weight gain by eating a healthy diet, exercising regularly, not smoking, limiting alcohol, and getting enough sleep.  · Talk with your health care provider or a dietitian for guidance about healthy eating and healthy lifestyle choices.  This information is not intended to replace advice given to you by your health care provider. Make sure you discuss any questions you have with your health care provider.  Document Released: 12/19/2017 Document Revised: 01/24/2018 Document Reviewed: 01/24/2018  [employer] Interactive Patient Education © 2018 [employer]    Osteoporosis  Osteoporosis happens when your bones become thinner and weaker. Weak bones can break (fracture) more easily when you slip or fall. Bones most at risk of breaking are in the hip, wrist, and spine.  Follow these  instructions at home:  · Get enough calcium and vitamin D. These nutrients are good for your bones.  · Exercise as told by your doctor.  · Do not use any tobacco products. This includes cigarettes, chewing tobacco, and electronic cigarettes. If you need help quitting, ask your doctor.  · Limit the amount of alcohol you drink.  · Take medicines only as told by your doctor.  · Keep all follow-up visits as told by your doctor. This is important.  · Take care at home to prevent falls. Some ways to do this are:  ? Keep rooms well lit and tidy.  ? Put safety rails on your stairs.  ? Put a rubber mat in the bathroom and other places that are often wet or slippery.  Get help right away if:  · You fall.  · You hurt yourself.  This information is not intended to replace advice given to you by your health care provider. Make sure you discuss any questions you have with your health care provider.  Document Released: 03/11/2013 Document Revised: 05/25/2017 Document Reviewed: 05/28/2015  [employer] Interactive Patient Education © 2018 [employer]    Preventing Osteoporosis, Adult  Osteoporosis is a condition that causes the bones to get weaker. With osteoporosis, the bones become thinner, and the normal spaces in bone tissue become larger. This can make the bones weak and cause them to break more easily. People who have osteoporosis are more likely to break their wrist, spine, or hip. Even a minor accident or injury can be enough to break weak bones.  Osteoporosis can occur with aging. Your body constantly replaces old bone tissue with new tissue. As you get older, you may lose bone tissue more quickly, or it may be replaced more slowly. Osteoporosis is more likely to develop if you have poor nutrition or do not get enough calcium or vitamin D. Other lifestyle factors can also play a role. By making some diet and lifestyle changes, you can help to keep your bones healthy and help to prevent osteoporosis.  What nutrition changes  can be made?  Nutrition plays an important role in maintaining healthy, strong bones.  · Make sure you get enough calcium every day from food or from calcium supplements.  ? If you are age 50 or younger, aim to get 1,000 mg of calcium every day.  ? If you are older than age 50, aim to get 1,200 mg of calcium every day.  · Try to get enough vitamin D every day.  ? If you are age 70 or younger, aim to get 600 international units (IU) every day.  ? If you are older than age 70, aim to get 800 international units every day.  · Follow a healthy diet. Eat plenty of foods that contain calcium and vitamin D.  ? Calcium is in milk, cheese, yogurt, and other dairy products. Some fish and vegetables are also good sources of calcium. Many foods such as cereals and breads have had calcium added to them (are fortified). Check nutrition labels to see how much calcium is in a food or drink.  ? Foods that contain vitamin D include milk, cereals, salmon, and tuna. Your body also makes vitamin D when you are out in the sun. Bare skin exposure to the sun on your face, arms, legs, or back for no more than 30 minutes a day, 2 times per week is more than enough. Beyond that, it is important to use sunblock to protect your skin from sunburn, which increases your risk for skin cancer.    What lifestyle changes can be made?  Making changes in your everyday life can also play an important role in preventing osteoporosis.  · Stay active and get exercise every day. Ask your health care provider what types of exercise are best for you.  · Do not use any products that contain nicotine or tobacco, such as cigarettes and e-cigarettes. If you need help quitting, ask your health care provider.  · Limit alcohol intake to no more than 1 drink a day for nonpregnant women and 2 drinks a day for men. One drink equals 12 oz of beer, 5 oz of wine, or 1½ oz of hard liquor.    Why are these changes important?  Making these nutrition and lifestyle changes  can:  · Help you develop and maintain healthy, strong bones.  · Prevent loss of bone mass and the problems that are caused by that loss, such as broken bones and delayed healing.  · Make you feel better mentally and physically.    What can happen if changes are not made?  Problems that can result from osteoporosis can be very serious. These may include:  · A higher risk of broken bones that are painful and do not heal well.  · Physical malformations, such as a collapsed spine or a hunched back.  · Problems with movement.    Where to find support:  If you need help making changes to prevent osteoporosis, talk with your health care provider. You can ask for a referral to a diet and nutrition specialist (dietitian) and a physical therapist.  Where to find more information:  Learn more about osteoporosis from:  · NIH Osteoporosis and Related Bone Diseases National Resource Center: www.niams.nih.gov/health_info/bone/osteoporosis/osteoporosis_ff.asp  · U.S. Office on Women’s Health: www.womenshealth.gov/publications/our-publications/fact-sheet/osteoporosis.html  · National Osteoporosis Foundation: www.nof.org/patients/what-is-osteoporosis/    Summary  · Osteoporosis is a condition that causes weak bones that are more likely to break.  · Eating a healthy diet and making sure you get enough calcium and vitamin D can help prevent osteoporosis.  · Other ways to reduce your risk of osteoporosis include getting regular exercise and avoiding alcohol and products that contain nicotine or tobacco.  This information is not intended to replace advice given to you by your health care provider. Make sure you discuss any questions you have with your health care provider.  Document Released: 01/01/2017 Document Revised: 08/28/2017 Document Reviewed: 08/28/2017  [employer] Interactive Patient Education © 2018 [employer].

## 2018-08-15 LAB
CALCIUM SPEC-SCNC: 9.4 MG/DL (ref 8.4–10.2)
PTH-INTACT SERPL-MCNC: 50.3 PG/ML (ref 10–65)

## 2018-08-16 LAB — 1,25(OH)2D3 SERPL-MCNC: 37.4 PG/ML (ref 19.9–79.3)
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
## 2018-08-20 RX ORDER — CYCLOBENZAPRINE HCL 10 MG
TABLET ORAL
Qty: 30 TABLET | Refills: 2 | Status: SHIPPED | OUTPATIENT
Start: 2018-08-20 | End: 2018-09-19 | Stop reason: SDUPTHER

## 2018-09-01 DIAGNOSIS — F43.21 GRIEF REACTION: ICD-10-CM

## 2018-09-01 DIAGNOSIS — M79.7 FIBROMYALGIA: ICD-10-CM

## 2018-09-01 DIAGNOSIS — F32.9 REACTIVE DEPRESSION: ICD-10-CM

## 2018-09-04 RX ORDER — DULOXETIN HYDROCHLORIDE 30 MG/1
CAPSULE, DELAYED RELEASE ORAL
Qty: 30 CAPSULE | Refills: 1 | Status: SHIPPED | OUTPATIENT
Start: 2018-09-04 | End: 2018-09-19 | Stop reason: SDUPTHER

## 2018-09-05 DIAGNOSIS — I10 ESSENTIAL HYPERTENSION: ICD-10-CM

## 2018-09-05 RX ORDER — LOSARTAN POTASSIUM AND HYDROCHLOROTHIAZIDE 12.5; 5 MG/1; MG/1
TABLET ORAL
Qty: 30 TABLET | Refills: 1 | Status: SHIPPED | OUTPATIENT
Start: 2018-09-05 | End: 2018-09-19 | Stop reason: SDUPTHER

## 2018-09-17 ENCOUNTER — TELEPHONE (OUTPATIENT)
Dept: FAMILY MEDICINE CLINIC | Facility: CLINIC | Age: 58
End: 2018-09-17

## 2018-09-19 DIAGNOSIS — E03.8 HYPOTHYROIDISM DUE TO HASHIMOTO'S THYROIDITIS: ICD-10-CM
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
## 2018-09-19 DIAGNOSIS — E06.3 HYPOTHYROIDISM DUE TO HASHIMOTO'S THYROIDITIS: ICD-10-CM

## 2018-09-19 DIAGNOSIS — I10 ESSENTIAL HYPERTENSION: ICD-10-CM

## 2018-09-19 DIAGNOSIS — M79.7 FIBROMYALGIA: ICD-10-CM

## 2018-09-19 DIAGNOSIS — F32.9 REACTIVE DEPRESSION: ICD-10-CM

## 2018-09-19 DIAGNOSIS — F43.21 GRIEF REACTION: ICD-10-CM

## 2018-09-19 RX ORDER — LEVOTHYROXINE SODIUM 175 UG/1
175 TABLET ORAL DAILY
Qty: 90 TABLET | Refills: 1 | Status: SHIPPED | OUTPATIENT
Start: 2018-09-19 | End: 2018-10-04 | Stop reason: SDUPTHER

## 2018-09-19 RX ORDER — LOSARTAN POTASSIUM AND HYDROCHLOROTHIAZIDE 12.5; 5 MG/1; MG/1
1 TABLET ORAL DAILY
Qty: 90 TABLET | Refills: 1 | Status: SHIPPED | OUTPATIENT
Start: 2018-09-19 | End: 2019-02-14 | Stop reason: SDUPTHER

## 2018-09-19 RX ORDER — DULOXETIN HYDROCHLORIDE 30 MG/1
30 CAPSULE, DELAYED RELEASE ORAL DAILY
Qty: 30 CAPSULE | Refills: 5 | Status: SHIPPED | OUTPATIENT
Start: 2018-09-19 | End: 2019-12-13

## 2018-09-19 RX ORDER — CYCLOBENZAPRINE HCL 10 MG
10 TABLET ORAL 3 TIMES DAILY PRN
Qty: 45 TABLET | Refills: 2 | Status: SHIPPED | OUTPATIENT
Start: 2018-09-19 | End: 2019-02-14 | Stop reason: SDUPTHER

## 2018-10-04 ENCOUNTER — TELEPHONE (OUTPATIENT)
Dept: FAMILY MEDICINE CLINIC | Facility: CLINIC | Age: 58
End: 2018-10-04

## 2018-10-04 DIAGNOSIS — E06.3 HYPOTHYROIDISM DUE TO HASHIMOTO'S THYROIDITIS: ICD-10-CM

## 2018-10-04 DIAGNOSIS — E03.8 HYPOTHYROIDISM DUE TO HASHIMOTO'S THYROIDITIS: ICD-10-CM

## 2018-10-04 RX ORDER — LEVOTHYROXINE SODIUM 175 UG/1
175 TABLET ORAL DAILY
Qty: 90 TABLET | Refills: 1 | Status: SHIPPED | OUTPATIENT
Start: 2018-10-04 | End: 2019-02-14 | Stop reason: SDUPTHER

## 2018-10-11 ENCOUNTER — TELEPHONE (OUTPATIENT)
Dept: FAMILY MEDICINE CLINIC | Facility: CLINIC | Age: 58
End: 2018-10-11

## 2018-10-11 NOTE — TELEPHONE ENCOUNTER
CALLED PATIENT TO REMIND THEM OF THE CLEAR VIEW MEETING TOMORROW LUNCH IS AT 11:30 AND MEETING STARTS AT 12

## 2018-11-12 DIAGNOSIS — M25.532 LEFT WRIST PAIN: ICD-10-CM

## 2018-11-12 RX ORDER — MELOXICAM 15 MG/1
15 TABLET ORAL DAILY
Qty: 30 TABLET | Refills: 5 | Status: SHIPPED | OUTPATIENT
Start: 2018-11-12 | End: 2019-02-14 | Stop reason: SDUPTHER

## 2019-02-14 ENCOUNTER — OFFICE VISIT (OUTPATIENT)
Dept: FAMILY MEDICINE CLINIC | Facility: CLINIC | Age: 59
End: 2019-02-14

## 2019-02-14 ENCOUNTER — APPOINTMENT (OUTPATIENT)
Dept: LAB | Facility: HOSPITAL | Age: 59
End: 2019-02-14

## 2019-02-14 VITALS
BODY MASS INDEX: 43.84 KG/M2 | DIASTOLIC BLOOD PRESSURE: 70 MMHG | WEIGHT: 223.31 LBS | HEIGHT: 60 IN | SYSTOLIC BLOOD PRESSURE: 122 MMHG

## 2019-02-14 DIAGNOSIS — Z23 INFLUENZA VACCINE ADMINISTERED: ICD-10-CM

## 2019-02-14 DIAGNOSIS — M25.532 LEFT WRIST PAIN: ICD-10-CM

## 2019-02-14 DIAGNOSIS — I10 ESSENTIAL HYPERTENSION: ICD-10-CM

## 2019-02-14 DIAGNOSIS — F43.21 GRIEF REACTION: Primary | ICD-10-CM

## 2019-02-14 DIAGNOSIS — M62.838 MUSCLE SPASM: ICD-10-CM

## 2019-02-14 DIAGNOSIS — E03.8 HYPOTHYROIDISM DUE TO HASHIMOTO'S THYROIDITIS: ICD-10-CM

## 2019-02-14 DIAGNOSIS — E06.3 HYPOTHYROIDISM DUE TO HASHIMOTO'S THYROIDITIS: ICD-10-CM

## 2019-02-14 DIAGNOSIS — K59.09 OTHER CONSTIPATION: ICD-10-CM

## 2019-02-14 DIAGNOSIS — B00.1 FEVER BLISTER: ICD-10-CM

## 2019-02-14 LAB
T4 FREE SERPL-MCNC: 1.42 NG/DL (ref 0.78–2.19)
TSH SERPL DL<=0.05 MIU/L-ACNC: 0.12 MIU/ML (ref 0.46–4.68)

## 2019-02-14 PROCEDURE — 90471 IMMUNIZATION ADMIN: CPT | Performed by: FAMILY MEDICINE

## 2019-02-14 PROCEDURE — 84443 ASSAY THYROID STIM HORMONE: CPT | Performed by: FAMILY MEDICINE

## 2019-02-14 PROCEDURE — 84439 ASSAY OF FREE THYROXINE: CPT | Performed by: FAMILY MEDICINE

## 2019-02-14 PROCEDURE — 36415 COLL VENOUS BLD VENIPUNCTURE: CPT | Performed by: FAMILY MEDICINE

## 2019-02-14 PROCEDURE — 99213 OFFICE O/P EST LOW 20 MIN: CPT | Performed by: FAMILY MEDICINE

## 2019-02-14 PROCEDURE — 90674 CCIIV4 VAC NO PRSV 0.5 ML IM: CPT | Performed by: FAMILY MEDICINE

## 2019-02-14 RX ORDER — LEVOTHYROXINE SODIUM 175 UG/1
175 TABLET ORAL DAILY
Qty: 90 TABLET | Refills: 1 | Status: SHIPPED | OUTPATIENT
Start: 2019-02-14 | End: 2019-06-18 | Stop reason: SDUPTHER

## 2019-02-14 RX ORDER — FAMCICLOVIR 500 MG/1
500 TABLET ORAL 3 TIMES DAILY
Qty: 21 TABLET | Refills: 2 | Status: CANCELLED | OUTPATIENT
Start: 2019-02-14

## 2019-02-14 RX ORDER — LOSARTAN POTASSIUM AND HYDROCHLOROTHIAZIDE 12.5; 5 MG/1; MG/1
1 TABLET ORAL DAILY
Qty: 90 TABLET | Refills: 1 | Status: SHIPPED | OUTPATIENT
Start: 2019-02-14 | End: 2019-03-18

## 2019-02-14 RX ORDER — FOLIC ACID 5 MG
1 CAPSULE ORAL DAILY
COMMUNITY
End: 2019-06-18 | Stop reason: SDUPTHER

## 2019-02-14 RX ORDER — FAMCICLOVIR 500 MG/1
1500 TABLET ORAL ONCE AS NEEDED
Qty: 3 TABLET | Refills: 2 | Status: SHIPPED | OUTPATIENT
Start: 2019-02-14 | End: 2019-03-18 | Stop reason: SDUPTHER

## 2019-02-14 RX ORDER — CYCLOBENZAPRINE HCL 10 MG
10 TABLET ORAL 3 TIMES DAILY PRN
Qty: 45 TABLET | Refills: 2 | Status: SHIPPED | OUTPATIENT
Start: 2019-02-14 | End: 2019-03-18

## 2019-02-14 RX ORDER — ESOMEPRAZOLE MAGNESIUM 40 MG/1
40 CAPSULE, DELAYED RELEASE ORAL
Qty: 30 CAPSULE | Refills: 3 | Status: SHIPPED | OUTPATIENT
Start: 2019-02-14 | End: 2019-12-13

## 2019-02-14 RX ORDER — POLYETHYLENE GLYCOL 3350 17 G/17G
17 POWDER, FOR SOLUTION ORAL DAILY
Qty: 30 EACH | Refills: 0 | Status: SHIPPED | OUTPATIENT
Start: 2019-02-14

## 2019-02-14 RX ORDER — MELOXICAM 15 MG/1
15 TABLET ORAL DAILY
Qty: 30 TABLET | Refills: 5 | Status: SHIPPED | OUTPATIENT
Start: 2019-02-14 | End: 2019-06-18 | Stop reason: SDUPTHER

## 2019-02-14 NOTE — PROGRESS NOTES
Subjective:     Argenis Blair is a 59 y.o. female who presents for follow up for depression and prolonged grief     Patient refused PHQ 9 today.  She reports that she is weaning herself off of her Cymbalta, reports that it makes her very sweaty.  She has been doing this over the past 1-1/2 months, initially taking the medication every other day, and for the past week has been taking it about 3 times a week.  We discussed cessation of the Cymbalta with a dose as needed for symptoms of withdrawal.    She reports intermittent outbreaks of fever blisters on her lip that occur sometimes as frequently as twice a month, and sometimes as infrequently as once every 2-3 months.  She has been taking her Famvir when she has an outbreak 500 mg twice a day for his many days as she has the fever blister.  We discussed the appropriate dosing for this medication.  Patient is to call when she has an outbreak so we can look at whether she requires a refill of the medication.  She is not interested on chronic suppressive therapy.    Patient reports that she has chronic seasonal allergies.  She currently uses a humidifier at night with essential oils, with improvement of her symptoms for several years since initiation of this use.  Reports that she still has some congestion.        Past Medical Hx:  Past Medical History:   Diagnosis Date   • Allergy to bee sting    • Essential (primary) hypertension    • Fibrocystic breast    • Hashimoto's thyroiditis    • Hypertensive left ventricular hypertrophy    • Osteoporosis    • PONV (postoperative nausea and vomiting)    • Primary hyperparathyroidism (CMS/HCC)     s/p parathyroidectomy      • Vitamin D deficiency        Past Surgical Hx:  Past Surgical History:   Procedure Laterality Date   • BLADDER SURGERY  2004   • CHOLECYSTECTOMY  2002   • COLONOSCOPY N/A 1/19/2018    Procedure: COLONOSCOPY;  Surgeon: Smason Smith MD;  Location: [facility] ENDOSCOPY;  Service:    • DILATATION AND  CURETTAGE  1986   • ENDOSCOPY N/A 1/19/2018    Procedure: ESOPHAGOGASTRODUODENOSCOPY--before follow up;  Surgeon: Samson Smith MD;  Location: [facility] ENDOSCOPY;  Service:    • HYSTERECTOMY  2004   • PARATHYROID GLAND SURGERY     • TUBAL ABDOMINAL LIGATION  1988   • UPPER GASTROINTESTINAL ENDOSCOPY  01/19/2018       Health Maintenance:  Health Maintenance   Topic Date Due   • ZOSTER VACCINE (1 of 2) 02/01/2010   • PAP SMEAR  06/22/2017   • INFLUENZA VACCINE  08/01/2018   • ANNUAL PHYSICAL  08/15/2019   • MAMMOGRAM  07/27/2020   • DXA SCAN  07/27/2020   • COLONOSCOPY  01/19/2023   • TDAP/TD VACCINES (2 - Td) 11/09/2027   • HEPATITIS C SCREENING  Completed       Current Meds:    Current Outpatient Medications:   •  cyclobenzaprine (FLEXERIL) 10 MG tablet, Take 1 tablet by mouth 3 (Three) Times a Day As Needed for Muscle Spasms., Disp: 45 tablet, Rfl: 2  •  DULoxetine (CYMBALTA) 30 MG capsule, Take 1 capsule by mouth Daily., Disp: 30 capsule, Rfl: 5  •  esomeprazole (NEXIUM) 40 MG capsule, Take 1 capsule by mouth Every Morning Before Breakfast., Disp: 30 capsule, Rfl: 3  •  famciclovir (FAMVIR) 500 MG tablet, Take 3 tablets by mouth 1 (One) Time As Needed (symptom recurrence) for up to 1 dose., Disp: 3 tablet, Rfl: 2  •  Folic Acid 5 MG capsule, Take 1 capsule by mouth Daily., Disp: , Rfl:   •  levothyroxine (SYNTHROID, LEVOTHROID) 175 MCG tablet, Take 1 tablet by mouth Daily., Disp: 90 tablet, Rfl: 1  •  losartan-hydrochlorothiazide (HYZAAR) 50-12.5 MG per tablet, Take 1 tablet by mouth Daily., Disp: 90 tablet, Rfl: 1  •  magnesium oxide (MAGOX) 400 (241.3 Mg) MG tablet tablet, Take 1 tablet by mouth Daily., Disp: 30 each, Rfl: 5  •  meloxicam (MOBIC) 15 MG tablet, Take 1 tablet by mouth Daily., Disp: 30 tablet, Rfl: 5  •  ondansetron (ZOFRAN) 4 MG tablet, Take 1 tablet by mouth Every 8 (Eight) Hours As Needed for Nausea or Vomiting., Disp: 10 tablet, Rfl: 0  •  polyethylene glycol (MIRALAX) packet, Take 17 g by  mouth Daily., Disp: 30 each, Rfl: 0    Allergies:  Percocet [oxycodone-acetaminophen]; Claritin [loratadine]; Keflex [cephalexin]; and Sulfa antibiotics    Family Hx:  Family History   Problem Relation Age of Onset   • Diabetes Mother    • Heart disease Mother    • Heart disease Father    • Heart disease Brother    • Cancer Other    • Breast cancer Other    • Hypertension Other    • Lung cancer Other    • Lung disease Other    • Breast cancer Maternal Aunt    • Breast cancer Paternal Aunt         Social History:  Social History     Socioeconomic History   • Marital status:      Spouse name: Not on file   • Number of children: Not on file   • Years of education: Not on file   • Highest education level: Not on file   Social Needs   • Financial resource strain: Not on file   • Food insecurity - worry: Not on file   • Food insecurity - inability: Not on file   • Transportation needs - medical: Not on file   • Transportation needs - non-medical: Not on file   Occupational History   • Not on file   Tobacco Use   • Smoking status: Never Smoker   • Smokeless tobacco: Never Used   Substance and Sexual Activity   • Alcohol use: No   • Drug use: No   • Sexual activity: Defer   Other Topics Concern   • Not on file   Social History Narrative   • Not on file       Review of Systems  Review of Systems   Constitutional: Negative for activity change, appetite change, fatigue and fever.   HENT: Positive for congestion, nosebleeds, postnasal drip and rhinorrhea. Negative for ear pain and sore throat.    Eyes: Negative for pain and visual disturbance.   Respiratory: Negative for cough and shortness of breath.    Cardiovascular: Negative for chest pain and palpitations.   Gastrointestinal: Negative for abdominal pain and nausea.   Endocrine: Negative for cold intolerance and heat intolerance.   Genitourinary: Negative for difficulty urinating and dysuria.   Musculoskeletal: Negative for arthralgias and gait problem.   Skin:  "Negative for color change and rash.   Neurological: Negative for dizziness, weakness and headaches.   Hematological: Negative for adenopathy. Does not bruise/bleed easily.   Psychiatric/Behavioral: Negative for agitation, confusion and sleep disturbance.       Objective:     /70 (BP Location: Left arm, Patient Position: Sitting, Cuff Size: Large Adult)   Ht 152.4 cm (60\")   Wt 101 kg (223 lb 5 oz)   BMI 43.61 kg/m²     Physical Exam   Constitutional: She is oriented to person, place, and time. She appears well-developed and well-nourished.   HENT:   Head: Normocephalic and atraumatic.       Eyes: Conjunctivae, EOM and lids are normal. Pupils are equal, round, and reactive to light.   Neck: Normal range of motion. Neck supple.   Cardiovascular: Normal rate, regular rhythm and normal heart sounds. Exam reveals no gallop and no friction rub.   No murmur heard.  Pulmonary/Chest: Effort normal and breath sounds normal.   Abdominal: Soft. Normal appearance and bowel sounds are normal. There is no tenderness.   Musculoskeletal: Normal range of motion.   Neurological: She is alert and oriented to person, place, and time.   Skin: Skin is warm, dry and intact.   Psychiatric: Her speech is normal and behavior is normal. Judgment and thought content normal. Her affect is labile. Cognition and memory are normal. She exhibits a depressed mood.       No exam data present  Assessment/Plan:     Argenis was seen today for depression.    Diagnoses and all orders for this visit:    Grief reaction    Fever blister  -     famciclovir (FAMVIR) 500 MG tablet; Take 3 tablets by mouth 1 (One) Time As Needed (symptom recurrence) for up to 1 dose.    Hypothyroidism due to Hashimoto's thyroiditis  -     levothyroxine (SYNTHROID, LEVOTHROID) 175 MCG tablet; Take 1 tablet by mouth Daily.  -     TSH  -     T4, free    Other constipation  -     polyethylene glycol (MIRALAX) packet; Take 17 g by mouth Daily.    Left wrist pain  -     " meloxicam (MOBIC) 15 MG tablet; Take 1 tablet by mouth Daily.    Essential hypertension  -     losartan-hydrochlorothiazide (HYZAAR) 50-12.5 MG per tablet; Take 1 tablet by mouth Daily.    Muscle spasm  -     cyclobenzaprine (FLEXERIL) 10 MG tablet; Take 1 tablet by mouth 3 (Three) Times a Day As Needed for Muscle Spasms.    Influenza vaccine administered  -     Flucelvax Quad=>4Years ([id number])    Other orders  -     magnesium oxide (MAGOX) 400 (241.3 Mg) MG tablet tablet; Take 1 tablet by mouth Daily.  -     Cancel: famciclovir (FAMVIR) 500 MG tablet; Take 1 tablet by mouth 3 (Three) Times a Day.  -     esomeprazole (NEXIUM) 40 MG capsule; Take 1 capsule by mouth Every Morning Before Breakfast.           Follow-up:     Return in about 4 weeks (around 3/14/2019) for pap smear.      Goals     • Less pain     • Less sadness/anxiety      Barriers: patient does not wish for treatment of her grief            Preventative:    Vaccines Recommended at this visit:   Influenza    Screenings Recommended at this visit:  No Screenings offered today. Patient is up to date on all screenings at this time.     Smoking Status:  Patient has never smoked.    Alcohol Intake:  Patient does not drink    Patient's Body mass index is 43.61 kg/m². BMI is above normal parameters. Recommendations include: exercise counseling and nutrition counseling.        RISK SCORE: 3      Signature:  Melisa Valente MD [facility] PGY3  [place] [address]  Office: [phone]          This document has been electronically signed by Melisa Valente MD on February 14, 2019 9:20 AM

## 2019-02-15 NOTE — PROGRESS NOTES
I have reviewed the notes, assessments, and/or procedures performed by Melisa Valente MD , I concur with her/his documentation of Argenis Blair.

## 2019-03-18 ENCOUNTER — PROCEDURE VISIT (OUTPATIENT)
Dept: FAMILY MEDICINE CLINIC | Facility: CLINIC | Age: 59
End: 2019-03-18

## 2019-03-18 ENCOUNTER — APPOINTMENT (OUTPATIENT)
Dept: LAB | Facility: HOSPITAL | Age: 59
End: 2019-03-18

## 2019-03-18 VITALS
HEART RATE: 107 BPM | WEIGHT: 227.3 LBS | OXYGEN SATURATION: 98 % | DIASTOLIC BLOOD PRESSURE: 88 MMHG | SYSTOLIC BLOOD PRESSURE: 128 MMHG | HEIGHT: 60 IN | BODY MASS INDEX: 44.62 KG/M2

## 2019-03-18 DIAGNOSIS — Z01.419 ENCOUNTER FOR WELL WOMAN EXAM WITH ROUTINE GYNECOLOGICAL EXAM: Primary | ICD-10-CM

## 2019-03-18 DIAGNOSIS — B00.1 FEVER BLISTER: ICD-10-CM

## 2019-03-18 DIAGNOSIS — N76.0 BACTERIAL VAGINOSIS: ICD-10-CM

## 2019-03-18 DIAGNOSIS — B96.89 BACTERIAL VAGINOSIS: ICD-10-CM

## 2019-03-18 LAB
CANDIDA ALBICANS: NEGATIVE
GARDNERELLA VAGINALIS: POSITIVE
TRICHOMONAS VAGINALIS PCR: NEGATIVE

## 2019-03-18 PROCEDURE — 87510 GARDNER VAG DNA DIR PROBE: CPT | Performed by: FAMILY MEDICINE

## 2019-03-18 PROCEDURE — 87480 CANDIDA DNA DIR PROBE: CPT | Performed by: FAMILY MEDICINE

## 2019-03-18 PROCEDURE — 87660 TRICHOMONAS VAGIN DIR PROBE: CPT | Performed by: FAMILY MEDICINE

## 2019-03-18 PROCEDURE — 99396 PREV VISIT EST AGE 40-64: CPT | Performed by: FAMILY MEDICINE

## 2019-03-18 RX ORDER — METRONIDAZOLE 500 MG/1
500 TABLET ORAL 3 TIMES DAILY
Qty: 21 TABLET | Refills: 0 | Status: SHIPPED | OUTPATIENT
Start: 2019-03-18 | End: 2019-03-25

## 2019-03-18 RX ORDER — FAMCICLOVIR 500 MG/1
1500 TABLET ORAL ONCE AS NEEDED
Qty: 3 TABLET | Refills: 2 | Status: SHIPPED | OUTPATIENT
Start: 2019-03-18 | End: 2019-12-13

## 2019-03-18 RX ORDER — VALACYCLOVIR HYDROCHLORIDE 500 MG/1
500 TABLET, FILM COATED ORAL DAILY
Qty: 30 TABLET | Refills: 3 | Status: SHIPPED | OUTPATIENT
Start: 2019-03-18 | End: 2019-06-18 | Stop reason: SDUPTHER

## 2019-03-18 NOTE — PROGRESS NOTES
I have reviewed the notes, assessments, and/or procedures performed by Melisa Valente MD, I concur with her/his documentation and assessment and plan for Argenis Blair.                This document has been electronically signed by Marcin Hardy MD on March 18, 2019 2:32 PM

## 2019-03-18 NOTE — PROGRESS NOTES
Subjective:     Argenis Blair is a 59 y.o. female who presents for pelvic exam     Patient is here today for pelvic exam.  She has had a total abdominal hysterectomy following leiomyoma.  She says that she has not had a Pap smear since then, no history of cervical cancer.  She has had no change in sexual partners since last STD screening and declines STD screening today.  She has had no vaginal discharge, pruritus, or dysparunia.     Patient reports that she has had 3 outbreaks of oral herpes this month and 2 last month.  She says she takes the Famvir at the first sign of tingling.  She was previously taking Famvir as a suppressive therapy, which we changed to as needed for outbreaks, and patient is currently having more frequent outbreaks, will prescribe her a new suppressive therapy.         Past Medical Hx:  Past Medical History:   Diagnosis Date   • Allergy to bee sting    • Essential (primary) hypertension    • Fibrocystic breast    • Hashimoto's thyroiditis    • Hypertensive left ventricular hypertrophy    • Osteoporosis    • PONV (postoperative nausea and vomiting)    • Primary hyperparathyroidism (CMS/HCC)     s/p parathyroidectomy      • Vitamin D deficiency        Past Surgical Hx:  Past Surgical History:   Procedure Laterality Date   • BLADDER SURGERY  2004   • CHOLECYSTECTOMY  2002   • COLONOSCOPY N/A 1/19/2018    Procedure: COLONOSCOPY;  Surgeon: Samson Smith MD;  Location: [facility] ENDOSCOPY;  Service:    • DILATATION AND CURETTAGE  1986   • ENDOSCOPY N/A 1/19/2018    Procedure: ESOPHAGOGASTRODUODENOSCOPY--before follow up;  Surgeon: Samson Smith MD;  Location: [facility] ENDOSCOPY;  Service:    • HYSTERECTOMY  2004   • PARATHYROID GLAND SURGERY     • TUBAL ABDOMINAL LIGATION  1988   • UPPER GASTROINTESTINAL ENDOSCOPY  01/19/2018       Health Maintenance:  Health Maintenance   Topic Date Due   • ZOSTER VACCINE (1 of 2) 02/01/2010   • ANNUAL PHYSICAL  08/15/2019   • MAMMOGRAM  07/27/2020   • DXA  SCAN  07/27/2020   • COLONOSCOPY  01/19/2023   • TDAP/TD VACCINES (2 - Td) 11/09/2027   • HEPATITIS C SCREENING  Completed   • INFLUENZA VACCINE  Completed   • PAP SMEAR  Discontinued       Current Meds:    Current Outpatient Medications:   •  esomeprazole (NEXIUM) 40 MG capsule, Take 1 capsule by mouth Every Morning Before Breakfast., Disp: 30 capsule, Rfl: 3  •  famciclovir (FAMVIR) 500 MG tablet, Take 3 tablets by mouth 1 (One) Time As Needed (symptom recurrence) for up to 1 dose., Disp: 3 tablet, Rfl: 2  •  Folic Acid 5 MG capsule, Take 1 capsule by mouth Daily., Disp: , Rfl:   •  levothyroxine (SYNTHROID, LEVOTHROID) 175 MCG tablet, Take 1 tablet by mouth Daily., Disp: 90 tablet, Rfl: 1  •  magnesium oxide (MAGOX) 400 (241.3 Mg) MG tablet tablet, Take 1 tablet by mouth Daily., Disp: 30 each, Rfl: 5  •  meloxicam (MOBIC) 15 MG tablet, Take 1 tablet by mouth Daily., Disp: 30 tablet, Rfl: 5  •  ondansetron (ZOFRAN) 4 MG tablet, Take 1 tablet by mouth Every 8 (Eight) Hours As Needed for Nausea or Vomiting., Disp: 10 tablet, Rfl: 0  •  polyethylene glycol (MIRALAX) packet, Take 17 g by mouth Daily., Disp: 30 each, Rfl: 0  •  DULoxetine (CYMBALTA) 30 MG capsule, Take 1 capsule by mouth Daily., Disp: 30 capsule, Rfl: 5  •  metroNIDAZOLE (FLAGYL) 500 MG tablet, Take 1 tablet by mouth 3 (Three) Times a Day for 7 days., Disp: 21 tablet, Rfl: 0  •  valACYclovir (VALTREX) 500 MG tablet, Take 1 tablet by mouth Daily., Disp: 30 tablet, Rfl: 3    Allergies:  Percocet [oxycodone-acetaminophen]; Claritin [loratadine]; Keflex [cephalexin]; and Sulfa antibiotics    Family Hx:  Family History   Problem Relation Age of Onset   • Diabetes Mother    • Heart disease Mother    • Heart disease Father    • Heart disease Brother    • Cancer Other    • Breast cancer Other    • Hypertension Other    • Lung cancer Other    • Lung disease Other    • Breast cancer Maternal Aunt    • Breast cancer Paternal Aunt         Social History:  Social  "History     Socioeconomic History   • Marital status:      Spouse name: Not on file   • Number of children: Not on file   • Years of education: Not on file   • Highest education level: Not on file   Social Needs   • Financial resource strain: Not on file   • Food insecurity - worry: Not on file   • Food insecurity - inability: Not on file   • Transportation needs - medical: Not on file   • Transportation needs - non-medical: Not on file   Occupational History   • Not on file   Tobacco Use   • Smoking status: Never Smoker   • Smokeless tobacco: Never Used   Substance and Sexual Activity   • Alcohol use: No   • Drug use: No   • Sexual activity: Defer   Other Topics Concern   • Not on file   Social History Narrative   • Not on file       Review of Systems  Review of Systems   Constitutional: Negative for activity change, appetite change, fatigue and fever.   HENT: Negative for ear pain and sore throat.    Eyes: Negative for pain and visual disturbance.   Respiratory: Negative for cough and shortness of breath.    Cardiovascular: Negative for chest pain and palpitations.   Gastrointestinal: Negative for abdominal pain and nausea.   Endocrine: Negative for cold intolerance and heat intolerance.   Genitourinary: Negative for decreased urine volume, difficulty urinating, dyspareunia, dysuria, frequency, genital sores, hematuria, menstrual problem, pelvic pain, urgency, vaginal bleeding, vaginal discharge and vaginal pain.   Musculoskeletal: Negative for arthralgias and gait problem.   Skin: Negative for color change and rash.   Neurological: Negative for dizziness, weakness and headaches.   Hematological: Negative for adenopathy. Does not bruise/bleed easily.   Psychiatric/Behavioral: Negative for agitation, confusion and sleep disturbance.       Objective:     /88   Pulse 107   Ht 152.4 cm (60\")   Wt 103 kg (227 lb 4.8 oz)   SpO2 98%   BMI 44.39 kg/m²     Physical Exam   Constitutional: She is oriented " to person, place, and time. She appears well-developed and well-nourished.   HENT:   Head: Normocephalic and atraumatic.   Eyes: Conjunctivae, EOM and lids are normal. Pupils are equal, round, and reactive to light.   Neck: Normal range of motion. Neck supple.   Cardiovascular: Normal rate, regular rhythm and normal heart sounds. Exam reveals no gallop and no friction rub.   No murmur heard.  Pulmonary/Chest: Effort normal and breath sounds normal. Right breast exhibits tenderness. Right breast exhibits no inverted nipple, no mass, no nipple discharge and no skin change. Left breast exhibits tenderness. Left breast exhibits no inverted nipple, no mass, no nipple discharge and no skin change.   Patient had significant tenderness to breast exam, reports history of fibrocystic disease, no masses noted on palpation.   Abdominal: Soft. Normal appearance and bowel sounds are normal. There is no tenderness.   Genitourinary: No labial fusion. There is no rash, tenderness, lesion or injury on the right labia. There is no rash, tenderness, lesion or injury on the left labia. Right adnexum displays no mass. Left adnexum displays no mass. No erythema, tenderness or bleeding in the vagina. No foreign body in the vagina. No signs of injury around the vagina. Vaginal discharge found.   Musculoskeletal: Normal range of motion.   Neurological: She is alert and oriented to person, place, and time.   Skin: Skin is warm, dry and intact.   Psychiatric: She has a normal mood and affect. Her speech is normal and behavior is normal. Judgment and thought content normal. Cognition and memory are normal.       No exam data present  Assessment/Plan:     Argenis was seen today for gynecologic exam.    Diagnoses and all orders for this visit:    Encounter for well woman exam with routine gynecological exam  -     Gardnerella vaginalis, Trichomonas vaginalis, Candida albicans, PCR - Swab, Vagina; Future  -     Gardnerella vaginalis, Trichomonas  vaginalis, Candida albicans, PCR - Swab, Vagina    Fever blister  -     famciclovir (FAMVIR) 500 MG tablet; Take 3 tablets by mouth 1 (One) Time As Needed (symptom recurrence) for up to 1 dose.  -     valACYclovir (VALTREX) 500 MG tablet; Take 1 tablet by mouth Daily.    Bacterial vaginosis  -     metroNIDAZOLE (FLAGYL) 500 MG tablet; Take 1 tablet by mouth 3 (Three) Times a Day for 7 days.           Follow-up:     Return in about 6 months (around 9/18/2019).      Goals     • Less pain     • Less sadness/anxiety      Barriers: patient does not wish for treatment of her grief            Preventative:    Vaccines Recommended at this visit:   No Vaccines recommended today. Patient is up to date on all vaccines.     Screenings Recommended at this visit:  Pelvic Exam    Smoking Status:  Patient has never smoked.    Alcohol Intake:  Patient does not drink    Patient's Body mass index is 44.39 kg/m². BMI is above normal parameters. Recommendations include: exercise counseling and nutrition counseling.        RISK SCORE: 3      Signature:  Melisa Valente MD [facility] PGY3  [place] [address]  Office: [phone]          This document has been electronically signed by Melisa Valente MD on March 18, 2019 1:58 PM

## 2019-06-18 ENCOUNTER — OFFICE VISIT (OUTPATIENT)
Dept: FAMILY MEDICINE CLINIC | Facility: CLINIC | Age: 59
End: 2019-06-18

## 2019-06-18 VITALS
WEIGHT: 231.5 LBS | HEART RATE: 57 BPM | SYSTOLIC BLOOD PRESSURE: 120 MMHG | DIASTOLIC BLOOD PRESSURE: 72 MMHG | BODY MASS INDEX: 45.45 KG/M2 | OXYGEN SATURATION: 99 % | HEIGHT: 60 IN

## 2019-06-18 DIAGNOSIS — E06.3 HYPOTHYROIDISM DUE TO HASHIMOTO'S THYROIDITIS: ICD-10-CM

## 2019-06-18 DIAGNOSIS — M25.532 LEFT WRIST PAIN: ICD-10-CM

## 2019-06-18 DIAGNOSIS — B00.1 FEVER BLISTER: ICD-10-CM

## 2019-06-18 DIAGNOSIS — E03.8 HYPOTHYROIDISM DUE TO HASHIMOTO'S THYROIDITIS: ICD-10-CM

## 2019-06-18 PROCEDURE — 99213 OFFICE O/P EST LOW 20 MIN: CPT | Performed by: FAMILY MEDICINE

## 2019-06-18 RX ORDER — FOLIC ACID 5 MG
1 CAPSULE ORAL DAILY
Qty: 30 EACH | Refills: 2 | Status: SHIPPED | OUTPATIENT
Start: 2019-06-18 | End: 2019-08-09 | Stop reason: SDUPTHER

## 2019-06-18 RX ORDER — MELOXICAM 15 MG/1
15 TABLET ORAL DAILY
Qty: 30 TABLET | Refills: 5 | Status: SHIPPED | OUTPATIENT
Start: 2019-06-18 | End: 2019-12-13

## 2019-06-18 RX ORDER — LOSARTAN POTASSIUM AND HYDROCHLOROTHIAZIDE 12.5; 5 MG/1; MG/1
1 TABLET ORAL DAILY
Qty: 30 TABLET | Refills: 2 | Status: SHIPPED | OUTPATIENT
Start: 2019-06-18 | End: 2019-10-23 | Stop reason: SDUPTHER

## 2019-06-18 RX ORDER — CYCLOBENZAPRINE HCL 10 MG
10 TABLET ORAL 3 TIMES DAILY PRN
Qty: 90 TABLET | Refills: 2 | Status: SHIPPED | OUTPATIENT
Start: 2019-06-18 | End: 2020-05-18 | Stop reason: SDUPTHER

## 2019-06-18 RX ORDER — LOSARTAN POTASSIUM AND HYDROCHLOROTHIAZIDE 12.5; 5 MG/1; MG/1
1 TABLET ORAL DAILY
Refills: 1 | COMMUNITY
Start: 2019-05-26 | End: 2019-06-18 | Stop reason: SDUPTHER

## 2019-06-18 RX ORDER — VALACYCLOVIR HYDROCHLORIDE 500 MG/1
500 TABLET, FILM COATED ORAL DAILY
Qty: 30 TABLET | Refills: 3 | Status: SHIPPED | OUTPATIENT
Start: 2019-06-18 | End: 2019-08-09 | Stop reason: SDUPTHER

## 2019-06-18 RX ORDER — LEVOTHYROXINE SODIUM 0.15 MG/1
150 TABLET ORAL DAILY
Qty: 30 TABLET | Refills: 2 | Status: SHIPPED | OUTPATIENT
Start: 2019-06-18 | End: 2019-09-19 | Stop reason: SDUPTHER

## 2019-06-18 RX ORDER — ONDANSETRON 4 MG/1
4 TABLET, FILM COATED ORAL EVERY 8 HOURS PRN
Qty: 10 TABLET | Refills: 0 | Status: SHIPPED | OUTPATIENT
Start: 2019-06-18 | End: 2021-01-20 | Stop reason: SDUPTHER

## 2019-06-18 RX ORDER — CYCLOBENZAPRINE HCL 10 MG
1 TABLET ORAL 3 TIMES DAILY PRN
Refills: 2 | COMMUNITY
Start: 2019-03-21 | End: 2019-06-18 | Stop reason: SDUPTHER

## 2019-06-18 NOTE — PROGRESS NOTES
Subjective:     Argenis Blair is a 59 y.o. female who presents for initial evaluation  for ear pain and fibromyalgia    Patient reports that her fibromyalgia pain is especially bad today.  She reports all of her body aches that are especially worse considering the change in weather.  Patient reports that storming weather frequently exacerbates her fibromyalgia and joint pain.  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    Patient reports that she has felt discomfort in her left ear for the past couple of days.  She does have access to Flonase at home.  She does not take any antihistamines.  She does have a history of seasonal allergies.        Past Medical Hx:  Past Medical History:   Diagnosis Date   • Allergy to bee sting    • Essential (primary) hypertension    • Fibrocystic breast    • Hashimoto's thyroiditis    • Hypertensive left ventricular hypertrophy    • Osteoporosis    • PONV (postoperative nausea and vomiting)    • Primary hyperparathyroidism (CMS/[facility])     s/p parathyroidectomy      • Vitamin D deficiency        Past Surgical Hx:  Past Surgical History:   Procedure Laterality Date   • BLADDER SURGERY  2004   • CHOLECYSTECTOMY  2002   • COLONOSCOPY N/A 1/19/2018    Procedure: COLONOSCOPY;  Surgeon: Samson Smith MD;  Location: [facility] ENDOSCOPY;  Service:    • DILATATION AND CURETTAGE  1986   • ENDOSCOPY N/A 1/19/2018    Procedure: ESOPHAGOGASTRODUODENOSCOPY--before follow up;  Surgeon: Samson Smith MD;  Location: [facility] ENDOSCOPY;  Service:    • HYSTERECTOMY  2004   • PARATHYROID GLAND SURGERY     • TUBAL ABDOMINAL LIGATION  1988   • UPPER GASTROINTESTINAL ENDOSCOPY  01/19/2018       Health Maintenance:  Health Maintenance   Topic Date Due   • ZOSTER VACCINE (1 of 2) 02/01/2010   • INFLUENZA VACCINE  08/01/2019   • ANNUAL PHYSICAL  08/15/2019   •  MAMMOGRAM  07/27/2020   • DXA SCAN  07/27/2020   • COLONOSCOPY  01/19/2023   • TDAP/TD VACCINES (2 - Td) 11/09/2027   • HEPATITIS C SCREENING  Completed   • PAP SMEAR  Discontinued       Current Meds:    Current Outpatient Medications:   •  cyclobenzaprine (FLEXERIL) 10 MG tablet, Take 1 tablet by mouth 3 (Three) Times a Day As Needed for Muscle Spasms., Disp: 90 tablet, Rfl: 2  •  esomeprazole (NEXIUM) 40 MG capsule, Take 1 capsule by mouth Every Morning Before Breakfast., Disp: 30 capsule, Rfl: 3  •  famciclovir (FAMVIR) 500 MG tablet, Take 3 tablets by mouth 1 (One) Time As Needed (symptom recurrence) for up to 1 dose., Disp: 3 tablet, Rfl: 2  •  Folic Acid 5 MG capsule, Take 1 capsule by mouth Daily., Disp: 30 each, Rfl: 2  •  levothyroxine (SYNTHROID, LEVOTHROID) 150 MCG tablet, Take 1 tablet by mouth Daily., Disp: 30 tablet, Rfl: 2  •  losartan-hydrochlorothiazide (HYZAAR) 50-12.5 MG per tablet, Take 1 tablet by mouth Daily., Disp: 30 tablet, Rfl: 2  •  magnesium oxide (MAGOX) 400 (241.3 Mg) MG tablet tablet, Take 1 tablet by mouth Daily., Disp: 30 each, Rfl: 5  •  meloxicam (MOBIC) 15 MG tablet, Take 1 tablet by mouth Daily., Disp: 30 tablet, Rfl: 5  •  ondansetron (ZOFRAN) 4 MG tablet, Take 1 tablet by mouth Every 8 (Eight) Hours As Needed for Nausea or Vomiting., Disp: 10 tablet, Rfl: 0  •  polyethylene glycol (MIRALAX) packet, Take 17 g by mouth Daily., Disp: 30 each, Rfl: 0  •  valACYclovir (VALTREX) 500 MG tablet, Take 1 tablet by mouth Daily., Disp: 30 tablet, Rfl: 3  •  DULoxetine (CYMBALTA) 30 MG capsule, Take 1 capsule by mouth Daily., Disp: 30 capsule, Rfl: 5    Allergies:  Percocet [oxycodone-acetaminophen]; Claritin [loratadine]; Keflex [cephalexin]; and Sulfa antibiotics    Family Hx:  Family History   Problem Relation Age of Onset   • Diabetes Mother    • Heart disease Mother    • Heart disease Father    • Heart disease Brother    • Cancer Other    • Breast cancer Other    • Hypertension Other   "  • Lung cancer Other    • Lung disease Other    • Breast cancer Maternal Aunt    • Breast cancer Paternal Aunt         Social History:  Social History     Socioeconomic History   • Marital status:      Spouse name: Not on file   • Number of children: Not on file   • Years of education: Not on file   • Highest education level: Not on file   Tobacco Use   • Smoking status: Never Smoker   • Smokeless tobacco: Never Used   Substance and Sexual Activity   • Alcohol use: No   • Drug use: No   • Sexual activity: Defer       Review of Systems  Review of Systems   Constitutional: Negative for activity change, appetite change, fatigue and fever.   HENT: Positive for congestion. Negative for ear pain and sore throat.    Eyes: Negative for pain and visual disturbance.   Respiratory: Negative for cough and shortness of breath.    Cardiovascular: Negative for chest pain and palpitations.   Gastrointestinal: Negative for abdominal pain and nausea.   Endocrine: Negative for cold intolerance and heat intolerance.   Genitourinary: Negative for difficulty urinating and dysuria.   Musculoskeletal: Positive for arthralgias and myalgias. Negative for gait problem.   Skin: Negative for color change and rash.   Neurological: Negative for dizziness, weakness and headaches.   Hematological: Negative for adenopathy. Does not bruise/bleed easily.   Psychiatric/Behavioral: Negative for agitation, confusion and sleep disturbance.       Objective:     /72 (BP Location: Left arm, Patient Position: Sitting, Cuff Size: Large Adult)   Pulse 57   Ht 152.4 cm (60\")   Wt 105 kg (231 lb 8 oz)   SpO2 99%   BMI 45.21 kg/m²     Physical Exam   Constitutional: She is oriented to person, place, and time. She appears well-developed and well-nourished.   HENT:   Head: Normocephalic and atraumatic.   Right Ear: Hearing, tympanic membrane, external ear and ear canal normal.   Left Ear: Hearing, tympanic membrane, external ear and ear canal " normal.   Mouth/Throat: Uvula is midline, oropharynx is clear and moist and mucous membranes are normal.   Eyes: Conjunctivae, EOM and lids are normal. Pupils are equal, round, and reactive to light.   Neck: Normal range of motion. Neck supple.   Cardiovascular: Normal rate, regular rhythm and normal heart sounds. Exam reveals no gallop and no friction rub.   No murmur heard.  Pulmonary/Chest: Effort normal and breath sounds normal.   Abdominal: Soft. Normal appearance and bowel sounds are normal. There is no tenderness.   Musculoskeletal: Normal range of motion.   Neurological: She is alert and oriented to person, place, and time.   Skin: Skin is warm, dry and intact.   Psychiatric: She has a normal mood and affect. Her speech is normal and behavior is normal. Judgment and thought content normal. Cognition and memory are normal.       No exam data present  Assessment/Plan:     Argenis was seen today for depression.    Diagnoses and all orders for this visit:    Fever blister  -     valACYclovir (VALTREX) 500 MG tablet; Take 1 tablet by mouth Daily.    Left wrist pain  -     meloxicam (MOBIC) 15 MG tablet; Take 1 tablet by mouth Daily.    Hypothyroidism due to Hashimoto's thyroiditis  -     levothyroxine (SYNTHROID, LEVOTHROID) 150 MCG tablet; Take 1 tablet by mouth Daily.  -     TSH; Future  -     DEXA Bone Density Axial    Other orders  -     ondansetron (ZOFRAN) 4 MG tablet; Take 1 tablet by mouth Every 8 (Eight) Hours As Needed for Nausea or Vomiting.  -     losartan-hydrochlorothiazide (HYZAAR) 50-12.5 MG per tablet; Take 1 tablet by mouth Daily.  -     Folic Acid 5 MG capsule; Take 1 capsule by mouth Daily.  -     cyclobenzaprine (FLEXERIL) 10 MG tablet; Take 1 tablet by mouth 3 (Three) Times a Day As Needed for Muscle Spasms.       Patient denied discussed with the use of CBD and associated risks and benefits as well as a small amount of research for pain in fibromyalgia patients daily.  Encourage patient to  discuss with her pharmacist about how CBD oil may affect her other medications based on  metabolism.  Patient reports that she had gotten a free sample of a herbal supplement called [employer] that may be related to cannabis plant in someway.    Patient believes that she has never had chickenpox as a child, but reports that all of her children and siblings did have chickenpox and she is never had any symptoms in the past.  Discussed possible indications for varicella vaccine to prevent any future chickenpox.  Considering her prior exposure she likely did have chickenpox with few symptoms.    Follow-up:     Return in about 6 months (around 12/18/2019).      Goals     • Less pain     • Less sadness/anxiety      Barriers: patient does not wish for treatment of her grief            Preventative:    Vaccines Recommended at this visit:   Shingrix    Screenings Recommended at this visit:  DEXA Scan    Smoking Status:  Patient has never smoked.    Alcohol Intake:  Patient does not drink    Patient's Body mass index is 45.21 kg/m². BMI is above normal parameters. Recommendations include: educational material, exercise counseling and nutrition counseling.        RISK SCORE: 3      Signature:  Melisa Valente MD [facility] PGY3  [place] [address]  Office: [phone]          This document has been electronically signed by Melisa Valente MD on June 18, 2019 10:48 AM

## 2019-06-25 NOTE — PROGRESS NOTES
I have reviewed the notes, assessments, and/or procedures performed by Melisa Valente MD, I concur with her/his documentation and assessment and plan for Argenis Blair.                This document has been electronically signed by Marcin Hardy MD on June 25, 2019 4:07 PM

## 2019-08-01 ENCOUNTER — LAB (OUTPATIENT)
Dept: LAB | Facility: HOSPITAL | Age: 59
End: 2019-08-01

## 2019-08-01 ENCOUNTER — TELEPHONE (OUTPATIENT)
Dept: FAMILY MEDICINE CLINIC | Facility: CLINIC | Age: 59
End: 2019-08-01

## 2019-08-01 DIAGNOSIS — Z00.00 HEALTHCARE MAINTENANCE: Primary | ICD-10-CM

## 2019-08-01 DIAGNOSIS — E06.3 HYPOTHYROIDISM DUE TO HASHIMOTO'S THYROIDITIS: ICD-10-CM

## 2019-08-01 DIAGNOSIS — E03.8 HYPOTHYROIDISM DUE TO HASHIMOTO'S THYROIDITIS: ICD-10-CM

## 2019-08-01 PROCEDURE — 84443 ASSAY THYROID STIM HORMONE: CPT

## 2019-08-01 PROCEDURE — 36415 COLL VENOUS BLD VENIPUNCTURE: CPT

## 2019-08-01 NOTE — TELEPHONE ENCOUNTER
Pt came by the desk and said she made her appt for her bone density from a referral that Dr. Valente put in, but that [facility] needed to have a referral for a [facility]ogram. They went ahead and made her an appointment for a mammogram on 8/12/2019.      They need the referral before her appointment or she wont be able to be seen.

## 2019-08-01 NOTE — TELEPHONE ENCOUNTER
Pt came by the desk and said she made her appt for her bone density from a referral that Dr. Valente put in, but that [facility] needed to have a referral for a [facility]ogram. They went ahead and made her an appointment for a mammogram on 8/12/2019.     They need the referral before her appointment or she wont be able to be seen.     Thank you.

## 2019-08-02 LAB — TSH SERPL DL<=0.05 MIU/L-ACNC: 1.43 MIU/ML (ref 0.27–4.2)

## 2019-08-09 DIAGNOSIS — B00.1 FEVER BLISTER: ICD-10-CM

## 2019-08-09 RX ORDER — FOLIC ACID 5 MG
1 CAPSULE ORAL DAILY
Qty: 30 EACH | Refills: 2 | Status: SHIPPED | OUTPATIENT
Start: 2019-08-09 | End: 2021-01-20 | Stop reason: SDUPTHER

## 2019-08-09 RX ORDER — VALACYCLOVIR HYDROCHLORIDE 500 MG/1
500 TABLET, FILM COATED ORAL DAILY
Qty: 30 TABLET | Refills: 3 | Status: SHIPPED | OUTPATIENT
Start: 2019-08-09 | End: 2019-12-13 | Stop reason: SDUPTHER

## 2019-08-09 NOTE — TELEPHONE ENCOUNTER
Patient called asking for a medication refill on her valACYclovir (VALTREX) 500 MG tablet and her Folic Acid 5 MG capsule. Patients last provider instruction states doesn't need to come back until 12/18/2019.    Thanks,  Kaycee

## 2019-09-19 DIAGNOSIS — E06.3 HYPOTHYROIDISM DUE TO HASHIMOTO'S THYROIDITIS: ICD-10-CM

## 2019-09-19 DIAGNOSIS — E03.8 HYPOTHYROIDISM DUE TO HASHIMOTO'S THYROIDITIS: ICD-10-CM

## 2019-09-19 RX ORDER — LEVOTHYROXINE SODIUM 0.15 MG/1
TABLET ORAL
Qty: 30 TABLET | Refills: 2 | Status: SHIPPED | OUTPATIENT
Start: 2019-09-19 | End: 2019-12-18 | Stop reason: SDUPTHER

## 2019-10-23 RX ORDER — HYDROCHLOROTHIAZIDE 12.5 MG/1
TABLET ORAL
Qty: 90 TABLET | Refills: 0 | OUTPATIENT
Start: 2019-10-23

## 2019-10-23 RX ORDER — LOSARTAN POTASSIUM 50 MG/1
TABLET ORAL
Qty: 90 TABLET | Refills: 0 | OUTPATIENT
Start: 2019-10-23

## 2019-10-24 RX ORDER — LOSARTAN POTASSIUM AND HYDROCHLOROTHIAZIDE 12.5; 5 MG/1; MG/1
1 TABLET ORAL DAILY
Qty: 30 TABLET | Refills: 2 | Status: SHIPPED | OUTPATIENT
Start: 2019-10-24 | End: 2019-12-13 | Stop reason: SDUPTHER

## 2019-12-01 DIAGNOSIS — M62.838 MUSCLE SPASM: ICD-10-CM

## 2019-12-02 RX ORDER — CYCLOBENZAPRINE HCL 10 MG
TABLET ORAL
Qty: 45 TABLET | Refills: 2 | OUTPATIENT
Start: 2019-12-02

## 2019-12-13 ENCOUNTER — LAB (OUTPATIENT)
Dept: LAB | Facility: HOSPITAL | Age: 59
End: 2019-12-13

## 2019-12-13 ENCOUNTER — OFFICE VISIT (OUTPATIENT)
Dept: FAMILY MEDICINE CLINIC | Facility: CLINIC | Age: 59
End: 2019-12-13

## 2019-12-13 VITALS
OXYGEN SATURATION: 98 % | BODY MASS INDEX: 44.59 KG/M2 | RESPIRATION RATE: 17 BRPM | TEMPERATURE: 98.8 F | DIASTOLIC BLOOD PRESSURE: 68 MMHG | HEIGHT: 60 IN | HEART RATE: 93 BPM | WEIGHT: 227.13 LBS | SYSTOLIC BLOOD PRESSURE: 122 MMHG

## 2019-12-13 DIAGNOSIS — E06.3 HYPOTHYROIDISM DUE TO HASHIMOTO'S THYROIDITIS: ICD-10-CM

## 2019-12-13 DIAGNOSIS — M25.532 LEFT WRIST PAIN: ICD-10-CM

## 2019-12-13 DIAGNOSIS — I10 ESSENTIAL HYPERTENSION: ICD-10-CM

## 2019-12-13 DIAGNOSIS — M81.0 OSTEOPOROSIS WITHOUT CURRENT PATHOLOGICAL FRACTURE, UNSPECIFIED OSTEOPOROSIS TYPE: Primary | ICD-10-CM

## 2019-12-13 DIAGNOSIS — E03.8 HYPOTHYROIDISM DUE TO HASHIMOTO'S THYROIDITIS: ICD-10-CM

## 2019-12-13 DIAGNOSIS — M81.0 OSTEOPOROSIS WITHOUT CURRENT PATHOLOGICAL FRACTURE, UNSPECIFIED OSTEOPOROSIS TYPE: ICD-10-CM

## 2019-12-13 DIAGNOSIS — B00.1 FEVER BLISTER: ICD-10-CM

## 2019-12-13 LAB
25(OH)D3 SERPL-MCNC: 30.8 NG/ML (ref 30–100)
T4 FREE SERPL-MCNC: 1.07 NG/DL (ref 0.93–1.7)
TSH SERPL DL<=0.05 MIU/L-ACNC: 12.4 UIU/ML (ref 0.27–4.2)

## 2019-12-13 PROCEDURE — 84443 ASSAY THYROID STIM HORMONE: CPT

## 2019-12-13 PROCEDURE — 36415 COLL VENOUS BLD VENIPUNCTURE: CPT

## 2019-12-13 PROCEDURE — 84439 ASSAY OF FREE THYROXINE: CPT

## 2019-12-13 PROCEDURE — 99213 OFFICE O/P EST LOW 20 MIN: CPT | Performed by: STUDENT IN AN ORGANIZED HEALTH CARE EDUCATION/TRAINING PROGRAM

## 2019-12-13 PROCEDURE — 82306 VITAMIN D 25 HYDROXY: CPT

## 2019-12-13 RX ORDER — CHOLECALCIFEROL (VITAMIN D3) 125 MCG
5000 CAPSULE ORAL DAILY
Qty: 30 CAPSULE | Refills: 11 | Status: SHIPPED | OUTPATIENT
Start: 2019-12-13 | End: 2021-01-20 | Stop reason: SDUPTHER

## 2019-12-13 RX ORDER — VALACYCLOVIR HYDROCHLORIDE 500 MG/1
500 TABLET, FILM COATED ORAL DAILY
Qty: 30 TABLET | Refills: 3 | Status: SHIPPED | OUTPATIENT
Start: 2019-12-13 | End: 2020-05-18 | Stop reason: SDUPTHER

## 2019-12-13 RX ORDER — FAMCICLOVIR 500 MG/1
1500 TABLET ORAL ONCE AS NEEDED
Qty: 3 TABLET | Refills: 2 | Status: SHIPPED | OUTPATIENT
Start: 2019-12-13 | End: 2021-01-20 | Stop reason: SDUPTHER

## 2019-12-13 RX ORDER — LOSARTAN POTASSIUM AND HYDROCHLOROTHIAZIDE 12.5; 5 MG/1; MG/1
1 TABLET ORAL DAILY
Qty: 30 TABLET | Refills: 2 | Status: SHIPPED | OUTPATIENT
Start: 2019-12-13 | End: 2020-05-18 | Stop reason: SDUPTHER

## 2019-12-13 NOTE — PROGRESS NOTES
Subjective   Argenis Blair is a 59 y.o. female who presents for initial evaluation  for establish care visit. Pt previously seen by Dr. Valente. She has a CMHx of hypothyroidism, HTN, and osteoporosis. Pt states all her symptoms are currently well controlled. She does endorse decreased mood, but relates this to the recent passing of her son. She has attended a grief support group and believes this has helped. She would not like therapy or medication at this time. Pt states she was previously on Vitamin D but this prescription was cancelled, she is wondering if she no longer needs to take this. No concerns of weight gain, fatigue. No other concerns at this time.       Past Medical Hx:  Past Medical History:   Diagnosis Date   • Allergy to bee sting    • Essential (primary) hypertension    • Fibrocystic breast    • Hashimoto's thyroiditis    • Hypertensive left ventricular hypertrophy    • Osteoporosis    • PONV (postoperative nausea and vomiting)    • Primary hyperparathyroidism (CMS/HCC)     s/p parathyroidectomy      • Vitamin D deficiency        Past Surgical Hx:  Past Surgical History:   Procedure Laterality Date   • BLADDER SURGERY  2004   • CHOLECYSTECTOMY  2002   • COLONOSCOPY N/A 1/19/2018    Procedure: COLONOSCOPY;  Surgeon: Samson Smith MD;  Location: [facility] ENDOSCOPY;  Service:    • DILATATION AND CURETTAGE  1986   • ENDOSCOPY N/A 1/19/2018    Procedure: ESOPHAGOGASTRODUODENOSCOPY--before follow up;  Surgeon: Samson Smith MD;  Location: [facility] ENDOSCOPY;  Service:    • HYSTERECTOMY  2004   • PARATHYROID GLAND SURGERY     • TUBAL ABDOMINAL LIGATION  1988   • UPPER GASTROINTESTINAL ENDOSCOPY  01/19/2018       Health Maintenance:  Health Maintenance   Topic Date Due   • ZOSTER VACCINE (1 of 2) 02/01/2010   • ANNUAL PHYSICAL  08/15/2019   • MAMMOGRAM  08/14/2021   • DXA SCAN  08/14/2021   • COLONOSCOPY  01/19/2023   • TDAP/TD VACCINES (2 - Td) 11/09/2027   • HEPATITIS C  SCREENING  Completed   • INFLUENZA VACCINE  Discontinued   • PAP SMEAR  Discontinued       Current Meds:    Current Outpatient Medications:   •  cyclobenzaprine (FLEXERIL) 10 MG tablet, Take 1 tablet by mouth 3 (Three) Times a Day As Needed for Muscle Spasms., Disp: 90 tablet, Rfl: 2  •  famciclovir (FAMVIR) 500 MG tablet, Take 3 tablets by mouth 1 (One) Time As Needed (symptom recurrence) for up to 1 dose., Disp: 3 tablet, Rfl: 2  •  Folic Acid 5 MG capsule, Take 1 capsule by mouth Daily., Disp: 30 each, Rfl: 2  •  levothyroxine (SYNTHROID, LEVOTHROID) 150 MCG tablet, TAKE 1 TABLET BY MOUTH ONCE DAILY, Disp: 30 tablet, Rfl: 2  •  losartan-hydrochlorothiazide (HYZAAR) 50-12.5 MG per tablet, Take 1 tablet by mouth Daily., Disp: 30 tablet, Rfl: 2  •  magnesium oxide (MAGOX) 400 (241.3 Mg) MG tablet tablet, Take 1 tablet by mouth Daily., Disp: 30 each, Rfl: 5  •  ondansetron (ZOFRAN) 4 MG tablet, Take 1 tablet by mouth Every 8 (Eight) Hours As Needed for Nausea or Vomiting., Disp: 10 tablet, Rfl: 0  •  valACYclovir (VALTREX) 500 MG tablet, Take 1 tablet by mouth Daily., Disp: 30 tablet, Rfl: 3  •  Cholecalciferol (VITAMIN D) 125 MCG (5000 UT) capsule capsule, Take 1 capsule by mouth Daily., Disp: 30 capsule, Rfl: 11  •  diclofenac (VOLTAREN) 50 MG EC tablet, Take 1 tablet by mouth 2 (Two) Times a Day As Needed (For pain)., Disp: 60 tablet, Rfl: 2  •  polyethylene glycol (MIRALAX) packet, Take 17 g by mouth Daily., Disp: 30 each, Rfl: 0    Allergies:  Percocet [oxycodone-acetaminophen]; Claritin [loratadine]; Keflex [cephalexin]; and Sulfa antibiotics    Family Hx:  Family History   Problem Relation Age of Onset   • Diabetes Mother    • Heart disease Mother    • Heart disease Father    • Heart disease Brother    • Cancer Other    • Breast cancer Other    • Hypertension Other    • Lung cancer Other    • Lung disease Other    • Breast cancer Maternal Aunt    • Breast cancer Paternal Aunt         Social History:  Social  "History     Socioeconomic History   • Marital status:      Spouse name: Not on file   • Number of children: Not on file   • Years of education: Not on file   • Highest education level: Not on file   Tobacco Use   • Smoking status: Never Smoker   • Smokeless tobacco: Never Used   Substance and Sexual Activity   • Alcohol use: No   • Drug use: No   • Sexual activity: Defer       Review of Systems  Review of Systems   Constitutional: Negative for activity change, chills, diaphoresis and fever.   HENT: Negative for dental problem, drooling, ear discharge, ear pain, facial swelling, mouth sores, nosebleeds, rhinorrhea, sinus pressure, sinus pain, sneezing and sore throat.    Eyes: Negative for pain, discharge and visual disturbance.   Respiratory: Negative for apnea, cough, shortness of breath, wheezing and stridor.    Cardiovascular: Negative for chest pain, palpitations and leg swelling.   Gastrointestinal: Negative for abdominal distention, abdominal pain, constipation, diarrhea, nausea and vomiting.   Genitourinary: Negative for dysuria, frequency and urgency.   Musculoskeletal: Negative for arthralgias and back pain.   Skin: Negative for color change, pallor, rash and wound.   Neurological: Negative for dizziness, facial asymmetry, light-headedness and headaches.   Psychiatric/Behavioral: Positive for dysphoric mood. Negative for agitation and decreased concentration. The patient is not nervous/anxious.             Objective:     /68 (BP Location: Left arm, Patient Position: Sitting, Cuff Size: Large Adult)   Pulse 93   Temp 98.8 °F (37.1 °C) (Tympanic)   Resp 17   Ht 152.4 cm (60\")   Wt 103 kg (227 lb 2 oz)   SpO2 98%   BMI 44.36 kg/m²       Physical Exam   Constitutional: She is oriented to person, place, and time. She appears well-developed and well-nourished.   HENT:   Head: Normocephalic and atraumatic.   Right Ear: External ear normal.   Left Ear: External ear normal.   Nose: Nose normal. "   Mouth/Throat: Oropharynx is clear and moist. No oropharyngeal exudate.   Eyes: Pupils are equal, round, and reactive to light. Conjunctivae and EOM are normal. Right eye exhibits no discharge. Left eye exhibits no discharge. No scleral icterus.   Neck: Normal range of motion. Neck supple. No JVD present.   Cardiovascular: Normal rate, regular rhythm, normal heart sounds and intact distal pulses. Exam reveals no gallop and no friction rub.   No murmur heard.  Pulmonary/Chest: Effort normal and breath sounds normal. No stridor. No respiratory distress. She has no wheezes. She has no rales.   Abdominal: Soft. Bowel sounds are normal. She exhibits no distension. There is no tenderness.   Musculoskeletal: Normal range of motion. She exhibits no edema, tenderness or deformity.   Neurological: She is alert and oriented to person, place, and time.   Skin: Skin is warm and dry. Capillary refill takes less than 2 seconds. No rash noted. No erythema. No pallor.   Psychiatric: She has a normal mood and affect. Her behavior is normal. Thought content normal.       Assessment/Plan:     Argenis was seen today for depression.    Diagnoses and all orders for this visit:    Osteoporosis without current pathological fracture, unspecified osteoporosis type  -     Cholecalciferol (VITAMIN D) 125 MCG (5000 UT) capsule capsule; Take 1 capsule by mouth Daily.  -     Vitamin D 25 hydroxy; Future    Fever blister  -     valACYclovir (VALTREX) 500 MG tablet; Take 1 tablet by mouth Daily.  -     famciclovir (FAMVIR) 500 MG tablet; Take 3 tablets by mouth 1 (One) Time As Needed (symptom recurrence) for up to 1 dose.    Left wrist pain  -     diclofenac (VOLTAREN) 50 MG EC tablet; Take 1 tablet by mouth 2 (Two) Times a Day As Needed (For pain).    Essential hypertension  -     losartan-hydrochlorothiazide (HYZAAR) 50-12.5 MG per tablet; Take 1 tablet by mouth Daily.    Hypothyroidism due to Hashimoto's thyroiditis  -     TSH; Future  -     T4,  free; Future    Other orders  -     magnesium oxide (MAGOX) 400 (241.3 Mg) MG tablet tablet; Take 1 tablet by mouth Daily.         Follow-up:     Return in about 3 months (around 3/13/2020).    Goals     • Less pain     • Less sadness/anxiety      Barriers: patient does not wish for treatment of her grief            Preventative:    Vaccines Recommended at this visit:   No Vaccines recommended today. Patient is up to date on all vaccines.     Vaccines Received at this visit:  No Vaccines recommended today. Patient is up to date on all vaccines.     Screenings Recommended at this visit:  TSH, Free T4    Screenings Ordered at this visit:  TSH, Free T4    Smoking Status:  Patient has never smoked.    Alcohol Intake:  Patient does not drink    Patient's Body mass index is 44.36 kg/m². BMI is above normal parameters. Recommendations include: exercise counseling and nutrition counseling.         RISK SCORE: 3          This document has been electronically signed by Juliann Segovia MD on December 13, 2019 11:37 AM

## 2019-12-18 DIAGNOSIS — E06.3 HYPOTHYROIDISM DUE TO HASHIMOTO'S THYROIDITIS: ICD-10-CM

## 2019-12-18 DIAGNOSIS — E03.8 HYPOTHYROIDISM DUE TO HASHIMOTO'S THYROIDITIS: ICD-10-CM

## 2019-12-18 RX ORDER — LEVOTHYROXINE SODIUM 175 UG/1
175 TABLET ORAL DAILY
Qty: 30 TABLET | Refills: 1 | Status: SHIPPED | OUTPATIENT
Start: 2019-12-18 | End: 2020-03-10 | Stop reason: SDUPTHER

## 2019-12-18 NOTE — PROGRESS NOTES
Patient's case discussed with supervising attending, Dr. Sean Morrison, who does agree that the patient's Synthroid should be increased to 175 mcg daily in light of her recent elevated TSH (12.4, increased from 1.43 on 8/1/2019).  The Synthroid 175 mcg has been sent to the patient's pharmacy on file, [facility] in [place], [place].      Rhonda Hathaway M.D. PGY3  [facility]  [address] [place] [address]  Office: [phone]      This document has been electronically signed by Rhonda Hathaway MD on December 18, 2019 2:52 PM

## 2020-03-10 ENCOUNTER — OFFICE VISIT (OUTPATIENT)
Dept: FAMILY MEDICINE CLINIC | Facility: CLINIC | Age: 60
End: 2020-03-10

## 2020-03-10 VITALS
WEIGHT: 232.1 LBS | HEART RATE: 102 BPM | DIASTOLIC BLOOD PRESSURE: 72 MMHG | HEIGHT: 60 IN | OXYGEN SATURATION: 98 % | SYSTOLIC BLOOD PRESSURE: 130 MMHG | BODY MASS INDEX: 45.57 KG/M2 | RESPIRATION RATE: 20 BRPM | TEMPERATURE: 97.4 F

## 2020-03-10 DIAGNOSIS — J06.9 VIRAL URI: Primary | ICD-10-CM

## 2020-03-10 DIAGNOSIS — E06.3 HYPOTHYROIDISM DUE TO HASHIMOTO'S THYROIDITIS: ICD-10-CM

## 2020-03-10 DIAGNOSIS — E03.8 HYPOTHYROIDISM DUE TO HASHIMOTO'S THYROIDITIS: ICD-10-CM

## 2020-03-10 DIAGNOSIS — J02.9 SORE THROAT: ICD-10-CM

## 2020-03-10 LAB
EXPIRATION DATE: NORMAL
INTERNAL CONTROL: NORMAL
Lab: 5627
S PYO AG THROAT QL: NEGATIVE

## 2020-03-10 PROCEDURE — 87880 STREP A ASSAY W/OPTIC: CPT | Performed by: STUDENT IN AN ORGANIZED HEALTH CARE EDUCATION/TRAINING PROGRAM

## 2020-03-10 PROCEDURE — 99213 OFFICE O/P EST LOW 20 MIN: CPT | Performed by: STUDENT IN AN ORGANIZED HEALTH CARE EDUCATION/TRAINING PROGRAM

## 2020-03-10 RX ORDER — LEVOTHYROXINE SODIUM 175 UG/1
175 TABLET ORAL DAILY
Qty: 30 TABLET | Refills: 1 | Status: SHIPPED | OUTPATIENT
Start: 2020-03-10 | End: 2020-05-18 | Stop reason: SDUPTHER

## 2020-03-10 RX ORDER — HYDROCHLOROTHIAZIDE 12.5 MG/1
12.5 CAPSULE, GELATIN COATED ORAL DAILY
COMMUNITY
Start: 2020-02-13 | End: 2020-05-20

## 2020-03-10 RX ORDER — LOSARTAN POTASSIUM 50 MG/1
50 TABLET ORAL DAILY
COMMUNITY
Start: 2020-02-13 | End: 2020-05-18 | Stop reason: SDUPTHER

## 2020-03-10 RX ORDER — FOLIC ACID 5 MG
1 CAPSULE ORAL DAILY
Qty: 30 EACH | Refills: 2 | Status: CANCELLED | OUTPATIENT
Start: 2020-03-10

## 2020-03-10 RX ORDER — LEVOTHYROXINE SODIUM 175 UG/1
175 TABLET ORAL DAILY
Qty: 30 TABLET | Refills: 1 | Status: CANCELLED | OUTPATIENT
Start: 2020-03-10

## 2020-03-10 NOTE — PROGRESS NOTES
"Subjective   Argenis Blair is a 60 y.o. female who presents for sore throat and ear pain.     History of Present Illness     The patient reports a 3-day history of sore throat and bilateral ear pain. She feels sharp pain in her ears. She endorses some rhinorrhea as well as congestion. She has been coughing up green phlegm that tasted like metal. She reports subjective fevers yesterday. She has tried Tylenol and essential oils, which have been helpful to open up her sinuses. She reports that she feels better than she did yesterday. She has not tried a netipot, because she cannot handle water on her face.    The patient has a history of hypothyroidism. She is currently on Synthroid 175 mcg. She is requesting a refill on this medicine, as she has been out of it for the last 4-5 days.    The following portions of the patient's history were reviewed and updated as appropriate: allergies, current medications, past social history and problem list.    Review of Systems   Constitutional: Negative for activity change, appetite change, chills and fever.   HENT: Positive for congestion, ear pain, rhinorrhea and sore throat. Negative for sneezing.    Eyes: Negative for discharge and visual disturbance.   Respiratory: Positive for cough. Negative for shortness of breath and wheezing.    Cardiovascular: Negative for chest pain, palpitations and leg swelling.   Gastrointestinal: Negative for abdominal pain, constipation, diarrhea, nausea and vomiting.   Genitourinary: Negative for difficulty urinating and dysuria.   Musculoskeletal: Negative for joint swelling and myalgias.   Skin: Negative for rash and wound.   Neurological: Negative for dizziness and syncope.   Psychiatric/Behavioral: Negative for confusion and sleep disturbance.     Objective   /72 (BP Location: Right arm, Patient Position: Sitting, Cuff Size: Adult)   Pulse 102   Temp 97.4 °F (36.3 °C) (Temporal)   Resp 20   Ht 152.4 cm (60\")   Wt 105 kg (232 lb " 1.6 oz)   SpO2 98%   BMI 45.33 kg/m²     Physical Exam   Constitutional: She is oriented to person, place, and time. She appears well-developed and well-nourished. No distress.   HENT:   Head: Normocephalic and atraumatic.   Right Ear: Tympanic membrane normal. Tympanic membrane is not erythematous. No middle ear effusion.   Left Ear: Tympanic membrane normal. Tympanic membrane is not erythematous.  No middle ear effusion.   Nose: Nose normal.   Eyes: Conjunctivae and EOM are normal. Right eye exhibits no discharge. Left eye exhibits no discharge. No scleral icterus.   Neck: Normal range of motion. Neck supple.   Cardiovascular: Normal rate, regular rhythm and normal heart sounds.   No murmur heard.  Pulmonary/Chest: Effort normal and breath sounds normal. No respiratory distress. She has no wheezes. She has no rales.   Abdominal: Soft. Bowel sounds are normal. She exhibits no distension. There is no tenderness.   Musculoskeletal: She exhibits no edema.   Neurological: She is alert and oriented to person, place, and time.   Skin: Skin is warm. No rash noted. She is not diaphoretic. No erythema.   Psychiatric: She has a normal mood and affect. Her behavior is normal.   Vitals reviewed.    Assessment/Plan   Diagnoses and all orders for this visit:    Viral URI        -     Supportive care is encouraged at this time.  She is educated about the double thickening phenomenon and advised to call if this happens.  Patient has an appointment with her PCP on 3/12/2020.  She is advised to follow her symptoms at that time.    Sore throat  -     POCT rapid strep A; REVIEWED - negative.    Hypothyroidism due to Hashimoto's thyroiditis  -     Patient states that she has been out of her medication for a few days.  She is requesting refill at this time.  She is declining lab draw at this time.  -     levothyroxine (SYNTHROID, LEVOTHROID) 175 MCG tablet; Take 1 tablet by mouth Daily.      Return in about 2 days (around 3/12/2020)  for Recheck with Dr. Juliann Segovia, as previously scheduled.      Goals     • Less pain     • Less sadness/anxiety      Barriers: patient does not wish for treatment of her grief            Preventative:  Health Maintenance   Topic Date Due   • PNEUMOCOCCAL VACCINE (19-64 MEDIUM RISK) (1 of 1 - PPSV23) 02/01/1979   • ZOSTER VACCINE (1 of 2) 02/01/2010   • ANNUAL PHYSICAL  08/15/2019   • MAMMOGRAM  08/14/2021   • DXA SCAN  08/14/2021   • COLONOSCOPY  01/19/2023   • TDAP/TD VACCINES (2 - Td) 11/09/2027   • HEPATITIS C SCREENING  Completed   • INFLUENZA VACCINE  Discontinued   • PAP SMEAR  Discontinued       Patient's Body mass index is 45.33 kg/m². BMI is above normal parameters. Recommendations include: exercise counseling and nutrition counseling.      RISK SCORE: 3      Rhonda Hathaway M.D. PGY3  [facility]  [address] [place] [address]  Office: [phone]      This document has been electronically signed by Rhonda Hathaway MD on March 11, 2020 10:25

## 2020-03-11 NOTE — PROGRESS NOTES
I have reviewed the notes, assessments, and/or procedures performed by dr Hathaway, I concur with her/his documentation and assessment and plan for Argenis Blair.                This document has been electronically signed by Marcin Hardy MD on March 11, 2020 11:30

## 2020-04-27 DIAGNOSIS — M25.532 LEFT WRIST PAIN: ICD-10-CM

## 2020-05-16 DIAGNOSIS — E06.3 HYPOTHYROIDISM DUE TO HASHIMOTO'S THYROIDITIS: ICD-10-CM

## 2020-05-16 DIAGNOSIS — E03.8 HYPOTHYROIDISM DUE TO HASHIMOTO'S THYROIDITIS: ICD-10-CM

## 2020-05-18 DIAGNOSIS — E03.8 HYPOTHYROIDISM DUE TO HASHIMOTO'S THYROIDITIS: ICD-10-CM

## 2020-05-18 DIAGNOSIS — I10 ESSENTIAL HYPERTENSION: ICD-10-CM

## 2020-05-18 DIAGNOSIS — B00.1 FEVER BLISTER: ICD-10-CM

## 2020-05-18 DIAGNOSIS — E06.3 HYPOTHYROIDISM DUE TO HASHIMOTO'S THYROIDITIS: ICD-10-CM

## 2020-05-20 RX ORDER — LEVOTHYROXINE SODIUM 175 UG/1
175 TABLET ORAL DAILY
Qty: 30 TABLET | Refills: 3 | Status: SHIPPED | OUTPATIENT
Start: 2020-05-20 | End: 2020-10-06

## 2020-05-20 RX ORDER — CYCLOBENZAPRINE HCL 10 MG
10 TABLET ORAL 3 TIMES DAILY PRN
Qty: 90 TABLET | Refills: 3 | Status: SHIPPED | OUTPATIENT
Start: 2020-05-20 | End: 2021-01-20 | Stop reason: SDUPTHER

## 2020-05-20 RX ORDER — LEVOTHYROXINE SODIUM 175 UG/1
TABLET ORAL
Qty: 30 TABLET | Refills: 0 | Status: SHIPPED | OUTPATIENT
Start: 2020-05-20 | End: 2020-11-05

## 2020-05-20 RX ORDER — VALACYCLOVIR HYDROCHLORIDE 500 MG/1
500 TABLET, FILM COATED ORAL DAILY
Qty: 30 TABLET | Refills: 3 | Status: SHIPPED | OUTPATIENT
Start: 2020-05-20 | End: 2020-06-10 | Stop reason: SDUPTHER

## 2020-05-20 RX ORDER — LOSARTAN POTASSIUM AND HYDROCHLOROTHIAZIDE 12.5; 5 MG/1; MG/1
1 TABLET ORAL DAILY
Qty: 30 TABLET | Refills: 3 | Status: SHIPPED | OUTPATIENT
Start: 2020-05-20 | End: 2020-10-06

## 2020-05-20 RX ORDER — LOSARTAN POTASSIUM 50 MG/1
50 TABLET ORAL DAILY
Qty: 30 TABLET | Refills: 3 | Status: SHIPPED | OUTPATIENT
Start: 2020-05-20 | End: 2020-10-06

## 2020-06-09 ENCOUNTER — TELEPHONE (OUTPATIENT)
Dept: FAMILY MEDICINE CLINIC | Facility: CLINIC | Age: 60
End: 2020-06-09

## 2020-06-09 NOTE — TELEPHONE ENCOUNTER
PATIENT CALLED ASKING FOR A MEDICATION REFILL ON HER valACYclovir (Valtrex) 500 MG tablet. PATIENT WOULD LIKE THAT SENT TO [facility] IN [place].    THANKS,  JHOAN

## 2020-06-10 DIAGNOSIS — B00.1 FEVER BLISTER: ICD-10-CM

## 2020-06-10 RX ORDER — VALACYCLOVIR HYDROCHLORIDE 500 MG/1
500 TABLET, FILM COATED ORAL DAILY
Qty: 30 TABLET | Refills: 3 | Status: SHIPPED | OUTPATIENT
Start: 2020-06-10 | End: 2021-01-20 | Stop reason: SDUPTHER

## 2020-08-28 DIAGNOSIS — M25.532 LEFT WRIST PAIN: ICD-10-CM

## 2020-08-31 DIAGNOSIS — M25.532 LEFT WRIST PAIN: ICD-10-CM

## 2020-10-06 DIAGNOSIS — E03.8 HYPOTHYROIDISM DUE TO HASHIMOTO'S THYROIDITIS: ICD-10-CM

## 2020-10-06 DIAGNOSIS — I10 ESSENTIAL HYPERTENSION: ICD-10-CM

## 2020-10-06 DIAGNOSIS — E06.3 HYPOTHYROIDISM DUE TO HASHIMOTO'S THYROIDITIS: ICD-10-CM

## 2020-10-06 RX ORDER — LEVOTHYROXINE SODIUM 175 UG/1
TABLET ORAL
Qty: 30 TABLET | Refills: 0 | Status: SHIPPED | OUTPATIENT
Start: 2020-10-06 | End: 2020-11-05

## 2020-10-06 RX ORDER — LOSARTAN POTASSIUM AND HYDROCHLOROTHIAZIDE 12.5; 5 MG/1; MG/1
TABLET ORAL
Qty: 90 TABLET | Refills: 0 | Status: SHIPPED | OUTPATIENT
Start: 2020-10-06 | End: 2020-11-06

## 2020-11-05 RX ORDER — LEVOTHYROXINE SODIUM 175 UG/1
175 TABLET ORAL DAILY
Qty: 90 TABLET | Refills: 0 | Status: SHIPPED | OUTPATIENT
Start: 2020-11-05 | End: 2021-01-20 | Stop reason: SDUPTHER

## 2020-11-05 NOTE — TELEPHONE ENCOUNTER
Pt called and said she lost her insurance due to COVID and that she was wondering if Dr. Segovia could send in refills of her Synthroid to last til the end of the year. I told her I would send a message to see what we could do.     Pt uses [facility] [place]    [phone]    Thank you.

## 2020-11-06 DIAGNOSIS — I10 ESSENTIAL HYPERTENSION: ICD-10-CM

## 2020-11-06 DIAGNOSIS — M25.532 LEFT WRIST PAIN: ICD-10-CM

## 2020-11-06 RX ORDER — LOSARTAN POTASSIUM AND HYDROCHLOROTHIAZIDE 12.5; 5 MG/1; MG/1
TABLET ORAL
Qty: 90 TABLET | Refills: 0 | Status: SHIPPED | OUTPATIENT
Start: 2020-11-06 | End: 2021-01-20 | Stop reason: SDUPTHER

## 2021-01-12 DIAGNOSIS — M25.532 LEFT WRIST PAIN: ICD-10-CM

## 2021-01-13 ENCOUNTER — TELEPHONE (OUTPATIENT)
Dept: FAMILY MEDICINE CLINIC | Facility: CLINIC | Age: 61
End: 2021-01-13

## 2021-01-15 ENCOUNTER — TELEPHONE (OUTPATIENT)
Dept: FAMILY MEDICINE CLINIC | Facility: CLINIC | Age: 61
End: 2021-01-15

## 2021-01-15 DIAGNOSIS — E06.3 HYPOTHYROIDISM DUE TO HASHIMOTO'S THYROIDITIS: Primary | ICD-10-CM

## 2021-01-15 DIAGNOSIS — E03.8 HYPOTHYROIDISM DUE TO HASHIMOTO'S THYROIDITIS: Primary | ICD-10-CM

## 2021-01-15 NOTE — TELEPHONE ENCOUNTER
Pt has apt Wednesday and wants to know if lab work for thyroid can be put in so that she can go ahead and get results and possible med changes.    Her call back number is [phone].

## 2021-01-20 ENCOUNTER — LAB (OUTPATIENT)
Dept: LAB | Facility: HOSPITAL | Age: 61
End: 2021-01-20

## 2021-01-20 ENCOUNTER — OFFICE VISIT (OUTPATIENT)
Dept: FAMILY MEDICINE CLINIC | Facility: CLINIC | Age: 61
End: 2021-01-20

## 2021-01-20 VITALS
DIASTOLIC BLOOD PRESSURE: 78 MMHG | SYSTOLIC BLOOD PRESSURE: 126 MMHG | TEMPERATURE: 98 F | WEIGHT: 231 LBS | BODY MASS INDEX: 45.11 KG/M2 | OXYGEN SATURATION: 98 % | HEART RATE: 86 BPM

## 2021-01-20 DIAGNOSIS — M81.0 OSTEOPOROSIS WITHOUT CURRENT PATHOLOGICAL FRACTURE, UNSPECIFIED OSTEOPOROSIS TYPE: ICD-10-CM

## 2021-01-20 DIAGNOSIS — Z00.00 HEALTHCARE MAINTENANCE: ICD-10-CM

## 2021-01-20 DIAGNOSIS — M62.830 BACK MUSCLE SPASM: ICD-10-CM

## 2021-01-20 DIAGNOSIS — E03.8 HYPOTHYROIDISM DUE TO HASHIMOTO'S THYROIDITIS: ICD-10-CM

## 2021-01-20 DIAGNOSIS — I10 ESSENTIAL HYPERTENSION: ICD-10-CM

## 2021-01-20 DIAGNOSIS — R11.0 NAUSEA: ICD-10-CM

## 2021-01-20 DIAGNOSIS — S83.281A ACUTE LATERAL MENISCAL TEAR, RIGHT, INITIAL ENCOUNTER: Primary | ICD-10-CM

## 2021-01-20 DIAGNOSIS — M25.532 LEFT WRIST PAIN: ICD-10-CM

## 2021-01-20 DIAGNOSIS — E03.9 HYPOTHYROIDISM, UNSPECIFIED TYPE: ICD-10-CM

## 2021-01-20 DIAGNOSIS — B00.1 FEVER BLISTER: ICD-10-CM

## 2021-01-20 DIAGNOSIS — E06.3 HYPOTHYROIDISM DUE TO HASHIMOTO'S THYROIDITIS: ICD-10-CM

## 2021-01-20 PROCEDURE — 99213 OFFICE O/P EST LOW 20 MIN: CPT | Performed by: STUDENT IN AN ORGANIZED HEALTH CARE EDUCATION/TRAINING PROGRAM

## 2021-01-20 PROCEDURE — 36415 COLL VENOUS BLD VENIPUNCTURE: CPT

## 2021-01-20 PROCEDURE — 84443 ASSAY THYROID STIM HORMONE: CPT

## 2021-01-20 PROCEDURE — 84439 ASSAY OF FREE THYROXINE: CPT

## 2021-01-20 RX ORDER — VALACYCLOVIR HYDROCHLORIDE 500 MG/1
500 TABLET, FILM COATED ORAL DAILY
Qty: 30 TABLET | Refills: 3 | Status: SHIPPED | OUTPATIENT
Start: 2021-01-20 | End: 2021-07-14 | Stop reason: SDUPTHER

## 2021-01-20 RX ORDER — CYCLOBENZAPRINE HCL 10 MG
10 TABLET ORAL 3 TIMES DAILY PRN
Qty: 90 TABLET | Refills: 3 | Status: SHIPPED | OUTPATIENT
Start: 2021-01-20 | End: 2021-12-27 | Stop reason: SDUPTHER

## 2021-01-20 RX ORDER — FAMCICLOVIR 500 MG/1
1500 TABLET ORAL ONCE AS NEEDED
Qty: 3 TABLET | Refills: 2 | Status: SHIPPED | OUTPATIENT
Start: 2021-01-20 | End: 2022-01-12 | Stop reason: SDUPTHER

## 2021-01-20 RX ORDER — ONDANSETRON 4 MG/1
4 TABLET, FILM COATED ORAL EVERY 8 HOURS PRN
Qty: 10 TABLET | Refills: 0 | Status: SHIPPED | OUTPATIENT
Start: 2021-01-20

## 2021-01-20 RX ORDER — LEVOTHYROXINE SODIUM 175 UG/1
175 TABLET ORAL DAILY
Qty: 90 TABLET | Refills: 0 | Status: SHIPPED | OUTPATIENT
Start: 2021-01-20 | End: 2021-02-23

## 2021-01-20 RX ORDER — LOSARTAN POTASSIUM AND HYDROCHLOROTHIAZIDE 12.5; 5 MG/1; MG/1
1 TABLET ORAL DAILY
Qty: 90 TABLET | Refills: 0 | Status: SHIPPED | OUTPATIENT
Start: 2021-01-20 | End: 2021-02-23

## 2021-01-20 RX ORDER — FOLIC ACID 5 MG
1 CAPSULE ORAL DAILY
Qty: 30 EACH | Refills: 2 | Status: SHIPPED | OUTPATIENT
Start: 2021-01-20 | End: 2021-06-18 | Stop reason: SDUPTHER

## 2021-01-20 NOTE — PROGRESS NOTES
Subjective   Argenis Blair is a 60 y.o. female who presents for follow up for for HTN, Hypothyroidism, Back spasms, and Osteoporosis. Pt states she is currently tolerating medications well with no ill effects. She states 1 month ago she was lifting [employer] decorations when she twisted and felt 7/10 pain in her knee. States minimal resolution with pain medication. Notes swelling of knee. She has not seen a provider for this previously.       Past Medical Hx:  Past Medical History:   Diagnosis Date   • Allergy to bee sting    • Essential (primary) hypertension    • Fibrocystic breast    • Hashimoto's thyroiditis    • Hypertensive left ventricular hypertrophy    • Osteoporosis    • PONV (postoperative nausea and vomiting)    • Primary hyperparathyroidism (CMS/HCC)     s/p parathyroidectomy      • Vitamin D deficiency        Past Surgical Hx:  Past Surgical History:   Procedure Laterality Date   • BLADDER SURGERY  2004   • CHOLECYSTECTOMY  2002   • COLONOSCOPY N/A 1/19/2018    Procedure: COLONOSCOPY;  Surgeon: Samson Smith MD;  Location: [facility] ENDOSCOPY;  Service:    • DILATATION AND CURETTAGE  1986   • ENDOSCOPY N/A 1/19/2018    Procedure: ESOPHAGOGASTRODUODENOSCOPY--before follow up;  Surgeon: Samson Smith MD;  Location: [facility] ENDOSCOPY;  Service:    • HYSTERECTOMY  2004   • PARATHYROID GLAND SURGERY     • TUBAL ABDOMINAL LIGATION  1988   • UPPER GASTROINTESTINAL ENDOSCOPY  01/19/2018       Health Maintenance:  Health Maintenance   Topic Date Due   • ZOSTER VACCINE (1 of 2) 02/01/2010   • ANNUAL PHYSICAL  08/15/2019   • MAMMOGRAM  08/14/2021   • DXA SCAN  08/14/2021   • COLONOSCOPY  01/19/2023   • TDAP/TD VACCINES (2 - Td) 11/09/2027   • HEPATITIS C SCREENING  Completed   • Pneumococcal Vaccine 0-64  Aged Out   • MENINGOCOCCAL VACCINE  Aged Out   • INFLUENZA VACCINE  Discontinued   • PAP SMEAR  Discontinued       Current Meds:    Current Outpatient Medications:   •  cyclobenzaprine  (FLEXERIL) 10 MG tablet, Take 1 tablet by mouth 3 (Three) Times a Day As Needed for Muscle Spasms., Disp: 90 tablet, Rfl: 3  •  diclofenac (VOLTAREN) 50 MG EC tablet, Take 1 tablet by mouth 2 (Two) Times a Day As Needed (For pain). for pain, Disp: 60 tablet, Rfl: 0  •  famciclovir (Famvir) 500 MG tablet, Take 3 tablets by mouth 1 (One) Time As Needed (symptom recurrence) for up to 1 dose., Disp: 3 tablet, Rfl: 2  •  Folic Acid 5 MG capsule, Take 1 capsule by mouth Daily., Disp: 30 each, Rfl: 2  •  levothyroxine (Synthroid) 175 MCG tablet, Take 1 tablet by mouth Daily., Disp: 90 tablet, Rfl: 0  •  losartan-hydrochlorothiazide (HYZAAR) 50-12.5 MG per tablet, Take 1 tablet by mouth Daily., Disp: 90 tablet, Rfl: 0  •  magnesium oxide (MAGOX) 400 (241.3 Mg) MG tablet tablet, Take 1 tablet by mouth Daily., Disp: 30 each, Rfl: 5  •  ondansetron (ZOFRAN) 4 MG tablet, Take 1 tablet by mouth Every 8 (Eight) Hours As Needed for Nausea or Vomiting., Disp: 10 tablet, Rfl: 0  •  polyethylene glycol (MIRALAX) packet, Take 17 g by mouth Daily., Disp: 30 each, Rfl: 0  •  valACYclovir (Valtrex) 500 MG tablet, Take 1 tablet by mouth Daily., Disp: 30 tablet, Rfl: 3  •  vitamin D3 (Vitamin D) 125 MCG (5000 UT) capsule capsule, Take 1 capsule by mouth Daily., Disp: 30 capsule, Rfl: 11    Allergies:  Percocet [oxycodone-acetaminophen], Claritin [loratadine], Keflex [cephalexin], and Sulfa antibiotics    Family Hx:  Family History   Problem Relation Age of Onset   • Diabetes Mother    • Heart disease Mother    • Heart disease Father    • Heart disease Brother    • Cancer Other    • Breast cancer Other    • Hypertension Other    • Lung cancer Other    • Lung disease Other    • Breast cancer Maternal Aunt    • Breast cancer Paternal Aunt         Social History:  Social History     Socioeconomic History   • Marital status:      Spouse name: Not on file   • Number of children: Not on file   • Years of education: Not on file   • Highest  education level: Not on file   Tobacco Use   • Smoking status: Never Smoker   • Smokeless tobacco: Never Used   Substance and Sexual Activity   • Alcohol use: No   • Drug use: No   • Sexual activity: Defer       Review of Systems  Review of Systems   Constitutional: Negative for activity change, chills, diaphoresis and fever.   HENT: Negative for dental problem, drooling, ear discharge, ear pain, facial swelling, mouth sores, nosebleeds, rhinorrhea, sinus pressure, sinus pain, sneezing and sore throat.    Eyes: Negative for pain, discharge and visual disturbance.   Respiratory: Negative for apnea, cough, shortness of breath, wheezing and stridor.    Cardiovascular: Negative for chest pain, palpitations and leg swelling.   Gastrointestinal: Negative for abdominal distention, abdominal pain, constipation, diarrhea, nausea and vomiting.   Genitourinary: Negative for dysuria, frequency and urgency.   Musculoskeletal: Positive for arthralgias. Negative for back pain.   Skin: Negative for rash and wound.   Neurological: Negative for dizziness, facial asymmetry, light-headedness and headaches.   Psychiatric/Behavioral: Negative for agitation and decreased concentration. The patient is not nervous/anxious.             Objective:     /78   Pulse 86   Temp 98 °F (36.7 °C)   Wt 105 kg (231 lb)   SpO2 98%   BMI 45.11 kg/m²       Physical Exam  Constitutional:       Appearance: She is well-developed.   HENT:      Head: Normocephalic and atraumatic.      Right Ear: External ear normal.      Left Ear: External ear normal.      Nose: Nose normal.      Mouth/Throat:      Pharynx: No oropharyngeal exudate.   Eyes:      General: No scleral icterus.        Right eye: No discharge.         Left eye: No discharge.      Conjunctiva/sclera: Conjunctivae normal.      Pupils: Pupils are equal, round, and reactive to light.   Neck:      Musculoskeletal: Normal range of motion and neck supple.      Vascular: No JVD.    Cardiovascular:      Rate and Rhythm: Normal rate and regular rhythm.      Heart sounds: Normal heart sounds. No murmur. No friction rub. No gallop.    Pulmonary:      Effort: Pulmonary effort is normal. No respiratory distress.      Breath sounds: Normal breath sounds. No stridor. No wheezing or rales.   Abdominal:      General: Bowel sounds are normal. There is no distension.      Palpations: Abdomen is soft.      Tenderness: There is no abdominal tenderness.   Musculoskeletal: Normal range of motion.         General: Swelling and tenderness present. No deformity.      Comments: R knee swelling. Pain with bearing weight. No ligamental tears appreciated. Pain with twisting knee when weight is applied to that leg.   Skin:     General: Skin is warm and dry.      Capillary Refill: Capillary refill takes less than 2 seconds.      Coloration: Skin is not pale.      Findings: No erythema or rash.   Neurological:      Mental Status: She is alert and oriented to person, place, and time.   Psychiatric:         Behavior: Behavior normal.         Assessment/Plan:     Diagnoses and all orders for this visit:    1. Acute lateral meniscal tear, right, initial encounter (Primary)  -     XR knee 3 vw right; Future  -     MRI knee right w wo contrast; Future  -     Ambulatory Referral to Orthopedic Surgery    2. Osteoporosis without current pathological fracture, unspecified osteoporosis type  -     vitamin D3 (Vitamin D) 125 MCG (5000 UT) capsule capsule; Take 1 capsule by mouth Daily.  Dispense: 30 capsule; Refill: 11    3. Left wrist pain  -     diclofenac (VOLTAREN) 50 MG EC tablet; Take 1 tablet by mouth 2 (Two) Times a Day As Needed (For pain). for pain  Dispense: 60 tablet; Refill: 0    4. Fever blister  -     famciclovir (Famvir) 500 MG tablet; Take 3 tablets by mouth 1 (One) Time As Needed (symptom recurrence) for up to 1 dose.  Dispense: 3 tablet; Refill: 2  -     valACYclovir (Valtrex) 500 MG tablet; Take 1 tablet by  mouth Daily.  Dispense: 30 tablet; Refill: 3    5. Essential hypertension  -     losartan-hydrochlorothiazide (HYZAAR) 50-12.5 MG per tablet; Take 1 tablet by mouth Daily.  Dispense: 90 tablet; Refill: 0    6. Hypothyroidism, unspecified type  -     levothyroxine (Synthroid) 175 MCG tablet; Take 1 tablet by mouth Daily.  Dispense: 90 tablet; Refill: 0  -     TSH  -     T4, Free    7. Healthcare maintenance  -     CBC & Differential  -     Comprehensive Metabolic Panel  -     Lipid Panel    8. Nausea  -     ondansetron (ZOFRAN) 4 MG tablet; Take 1 tablet by mouth Every 8 (Eight) Hours As Needed for Nausea or Vomiting.  Dispense: 10 tablet; Refill: 0    9. Back muscle spasm  -     cyclobenzaprine (FLEXERIL) 10 MG tablet; Take 1 tablet by mouth 3 (Three) Times a Day As Needed for Muscle Spasms.  Dispense: 90 tablet; Refill: 3    Other orders  -     Folic Acid 5 MG capsule; Take 1 capsule by mouth Daily.  Dispense: 30 each; Refill: 2  -     magnesium oxide (MAGOX) 400 (241.3 Mg) MG tablet tablet; Take 1 tablet by mouth Daily.  Dispense: 30 each; Refill: 5     Pt's current chronic conditions well controlled with medication. Will refill medications at this time and obtain labs as she is currently due. Will order Xray and MRI of right knee to evaluate for suspected meniscal tear. Will provide referral to Ortho at this time. Advised to continue use of NSAID for antiinflammatory effect.     Follow-up:     No follow-ups on file.    Goals     • Less pain     • Less sadness/anxiety      Barriers: patient does not wish for treatment of her grief            Preventative:    Vaccines Recommended at this visit:   No Vaccines recommended today. Patient is up to date on all vaccines.     Vaccines Received at this visit:  No Vaccines recommended today. Patient is up to date on all vaccines.     Screenings Recommended at this visit:  No Screenings offered today. Patient is up to date on all screenings at this time.     Screenings  Ordered at this visit:  No screenings were offered today. Patient is up to date on all screenings.     Smoking Status:  Patient has never smoked.    Alcohol Intake:  Patient does not drink    Patient's Body mass index is 45.11 kg/m². BMI is above normal parameters. Recommendations include: exercise counseling and nutrition counseling.         RISK SCORE: 3          This document has been electronically signed by Juliann Segovia MD on January 20, 2021 16:50 CST

## 2021-01-21 LAB
T4 FREE SERPL-MCNC: 1.7 NG/DL (ref 0.93–1.7)
TSH SERPL DL<=0.05 MIU/L-ACNC: 1.16 UIU/ML (ref 0.27–4.2)

## 2021-01-22 DIAGNOSIS — M25.561 ACUTE PAIN OF RIGHT KNEE: Primary | ICD-10-CM

## 2021-01-25 ENCOUNTER — OFFICE VISIT (OUTPATIENT)
Dept: ORTHOPEDIC SURGERY | Facility: CLINIC | Age: 61
End: 2021-01-25

## 2021-01-25 VITALS — BODY MASS INDEX: 46.13 KG/M2 | WEIGHT: 235 LBS | HEIGHT: 60 IN

## 2021-01-25 DIAGNOSIS — M25.561 ACUTE PAIN OF RIGHT KNEE: Primary | ICD-10-CM

## 2021-01-25 DIAGNOSIS — M23.91 INTERNAL DERANGEMENT OF RIGHT KNEE: ICD-10-CM

## 2021-01-25 PROCEDURE — 99202 OFFICE O/P NEW SF 15 MIN: CPT | Performed by: NURSE PRACTITIONER

## 2021-01-25 NOTE — PROGRESS NOTES
Argenis Blair is a 60 y.o. female   Primary provider:  Juliann Segovia MD       Chief Complaint   Patient presents with   • Right Knee - Pain   • Establish Care       HISTORY OF PRESENT ILLNESS:    Patient is a 60-year-old female who presents today with complaints of right knee pain that has been present for approximately 5 to 6 weeks.  Patient reports that 5 to 6 weeks ago she went to lift a tote full of [employer] decorations and when she turned while holding tote she felt a pop and immediate pain in her right knee.  She reports that while sitting her pain is a 3 out of 10, however sometimes with certain movements pain is almost unbearable.  She takes diclofenac for chronic shoulder pain, she reports that this has not helped with her knee.  She reports pain with rolling over at night.  She reports stiffness after long periods of sitting.  She reports going down stairs are particularly painful.  She also reports popping, locking, catching.  She states at times it also feels as if her knee is going to buckle.  She has an MRI scheduled for this Thursday.    Pain  This is a new problem. The current episode started more than 1 month ago (12/2020). The problem occurs constantly. Associated symptoms include joint swelling. Associated symptoms comments: Clicking, stabbing, swelling. The symptoms are aggravated by standing and walking.        CONCURRENT MEDICAL HISTORY:    Past Medical History:   Diagnosis Date   • Allergy to bee sting    • Anemia    • Asthma    • Colitis    • Depression    • Essential (primary) hypertension    • Fibrocystic breast    • Hashimoto's thyroiditis    • Hypertensive left ventricular hypertrophy    • Osteoporosis    • Osteoporosis    • PONV (postoperative nausea and vomiting)    • Primary hyperparathyroidism (CMS/HCC)     s/p parathyroidectomy      • Vitamin D deficiency        Allergies   Allergen Reactions   • Percocet [Oxycodone-Acetaminophen] Delirium   • Claritin [Loratadine]    •  Keflex [Cephalexin] Nausea And Vomiting   • Sulfa Antibiotics          Current Outpatient Medications:   •  COLLAGEN PO, Take  by mouth., Disp: , Rfl:   •  cyclobenzaprine (FLEXERIL) 10 MG tablet, Take 1 tablet by mouth 3 (Three) Times a Day As Needed for Muscle Spasms., Disp: 90 tablet, Rfl: 3  •  diclofenac (VOLTAREN) 50 MG EC tablet, Take 1 tablet by mouth 2 (Two) Times a Day As Needed (For pain). for pain, Disp: 60 tablet, Rfl: 0  •  famciclovir (Famvir) 500 MG tablet, Take 3 tablets by mouth 1 (One) Time As Needed (symptom recurrence) for up to 1 dose., Disp: 3 tablet, Rfl: 2  •  Folic Acid 5 MG capsule, Take 1 capsule by mouth Daily., Disp: 30 each, Rfl: 2  •  levothyroxine (Synthroid) 175 MCG tablet, Take 1 tablet by mouth Daily., Disp: 90 tablet, Rfl: 0  •  losartan-hydrochlorothiazide (HYZAAR) 50-12.5 MG per tablet, Take 1 tablet by mouth Daily., Disp: 90 tablet, Rfl: 0  •  magnesium oxide (MAGOX) 400 (241.3 Mg) MG tablet tablet, Take 1 tablet by mouth Daily., Disp: 30 each, Rfl: 5  •  ondansetron (ZOFRAN) 4 MG tablet, Take 1 tablet by mouth Every 8 (Eight) Hours As Needed for Nausea or Vomiting., Disp: 10 tablet, Rfl: 0  •  polyethylene glycol (MIRALAX) packet, Take 17 g by mouth Daily., Disp: 30 each, Rfl: 0  •  valACYclovir (Valtrex) 500 MG tablet, Take 1 tablet by mouth Daily., Disp: 30 tablet, Rfl: 3  •  vitamin D3 (Vitamin D) 125 MCG (5000 UT) capsule capsule, Take 1 capsule by mouth Daily., Disp: 30 capsule, Rfl: 11    Past Surgical History:   Procedure Laterality Date   • BLADDER SURGERY  2004   • CHOLECYSTECTOMY  2002   • COLONOSCOPY N/A 1/19/2018    Procedure: COLONOSCOPY;  Surgeon: Samson Smith MD;  Location: [facility] ENDOSCOPY;  Service:    • DILATATION AND CURETTAGE  1986   • ENDOSCOPY N/A 1/19/2018    Procedure: ESOPHAGOGASTRODUODENOSCOPY--before follow up;  Surgeon: Samson Smith MD;  Location: [facility] ENDOSCOPY;  Service:    • HYSTERECTOMY  2004   • PARATHYROID GLAND SURGERY     • TUBAL  "ABDOMINAL LIGATION  1988   • UPPER GASTROINTESTINAL ENDOSCOPY  01/19/2018       Family History   Problem Relation Age of Onset   • Diabetes Mother    • Heart disease Mother    • Heart disease Father    • Heart disease Brother    • Cancer Other    • Breast cancer Other    • Hypertension Other    • Lung cancer Other    • Lung disease Other    • Breast cancer Maternal Aunt    • Breast cancer Paternal Aunt        Social History     Socioeconomic History   • Marital status:      Spouse name: Not on file   • Number of children: Not on file   • Years of education: Not on file   • Highest education level: Not on file   Tobacco Use   • Smoking status: Never Smoker   • Smokeless tobacco: Never Used   Substance and Sexual Activity   • Alcohol use: No   • Drug use: No   • Sexual activity: Defer        Review of Systems   Musculoskeletal: Positive for joint swelling.        Right knee pain   Psychiatric/Behavioral: Positive for sleep disturbance.   All other systems reviewed and are negative.      PHYSICAL EXAMINATION:       Ht 152.4 cm (60\")   Wt 107 kg (235 lb)   BMI 45.90 kg/m²     Physical Exam  Vitals signs and nursing note reviewed.   Constitutional:       General: She is not in acute distress.     Appearance: She is well-developed. She is not toxic-appearing.   HENT:      Head: Normocephalic.   Pulmonary:      Effort: Pulmonary effort is normal. No respiratory distress.   Musculoskeletal:      Right knee: She exhibits no effusion.      Left knee: She exhibits no effusion.   Skin:     General: Skin is warm and dry.   Neurological:      Mental Status: She is alert and oriented to person, place, and time.   Psychiatric:         Behavior: Behavior normal.         Thought Content: Thought content normal.         Judgment: Judgment normal.         GAIT:     []  Normal  [x]  Antalgic    Assistive device: [x]  None  []  Walker     []  Crutches  []  Cane     []  Wheelchair  []  Stretcher    Right Knee Exam     Tenderness "   The patient is experiencing tenderness in the lateral joint line.    Range of Motion   Extension: -10   Flexion: 130     Tests   Ghanshyam:  Medial - positive Lateral - positive  Varus: negative Valgus: negative  Drawer:  Anterior - negative    Posterior - negative  Patellar apprehension: negative    Other   Erythema: absent  Sensation: normal  Pulse: present  Swelling: mild  Effusion: no effusion present    Comments:  Stable knee joint  Pain with arc of motion  Pain especially with lateral Ghanshyam  Mild swelling  No evidence of infection      Left Knee Exam     Range of Motion   Extension: 0   Flexion: 140     Tests   Varus: negative Valgus: negative  Drawer:  Anterior - negative     Posterior - negative  Patellar apprehension: negative    Other   Erythema: absent  Sensation: normal  Pulse: present  Swelling: none  Effusion: no effusion present                  Xr Knee 4+ Vw Right    Result Date: 1/21/2021  Narrative: Knee pain. Right knee, four views. Examination revealed degenerative changes manifested by joint space narrowing, periarticular osteophytes and subchondral sclerosis. No joint effusion is identified. No soft tissue abnormality is seen.     Impression: CONCLUSION: Degenerative changes. Electronically signed by:  Daniel Caicedo MD  1/21/2021 7:20 PM [facility] Workstation: [id number]    Xr Knee Bilateral Ap Standing    Result Date: 1/25/2021  Narrative: Study: XR knee bilateral AP standing Comparison: None Narrative: Bilateral knees have subtle varus positioning.  There is mild tricompartmental joint space narrowing in bilateral knees, more prominent in bilateral medial compartments.  Both tibial plateau surfaces are with sclerotic changes.  There are small marginal osteophyte formation seen throughout both knees.  No evidence of fracture or dislocation. Pippa BRO 1/25/2021          ASSESSMENT:    Diagnoses and all orders for this visit:    Acute pain of right knee  -     Miscellaneous  DME    Internal derangement of right knee    Other orders  -     COLLAGEN PO; Take  by mouth.          PLAN    Suspect meniscal pathology based on patient's reports of injury, symptoms, and exam.  Recommend that patient proceed with knee brace, rice therapy, continue diclofenac, notified weightbearing using cane.  Patient reports having cane that she can use at home.  Patient instructed to ice/elevate knee at least every 3-4 hours for 15 to 20 minutes each time.  Off-the-shelf brace did not fit patient today, therefore patient was given DME order for custom brace.  Intra-articular steroid injection was offered to patient, however patient declined stating that she does not tolerate steroids very well.  Patient reports that she does not want Medrol Dosepak either as these in the past have also made her feel very jittery and emotional.  Discussed with patient that if meniscus is origin of patient's pain then we can either consider surgical options or physical therapy.  Patient to return 2 to 3 days post MRI so that we can review results together.    Return for Return after MRI.    Pippa Bustos, APRN

## 2021-01-28 ENCOUNTER — HOSPITAL ENCOUNTER (OUTPATIENT)
Dept: MRI IMAGING | Facility: HOSPITAL | Age: 61
Discharge: HOME | End: 2021-01-28
Admitting: RADIOLOGY

## 2021-01-28 DIAGNOSIS — S83.281A ACUTE LATERAL MENISCAL TEAR, RIGHT, INITIAL ENCOUNTER: ICD-10-CM

## 2021-01-28 PROCEDURE — 73721 MRI JNT OF LWR EXTRE W/O DYE: CPT

## 2021-01-28 NOTE — PROGRESS NOTES
I have reviewed the notes, assessments, and/or procedures performed by Juliann Segovia MD during office visit. I concur with her/his documentation and assessment and plan for Argenis Blair.      This document has been electronically signed by Sean Morrison MD on January 28, 2021 13:08 CST

## 2021-02-01 ENCOUNTER — OFFICE VISIT (OUTPATIENT)
Dept: ORTHOPEDIC SURGERY | Facility: CLINIC | Age: 61
End: 2021-02-01

## 2021-02-01 VITALS — HEIGHT: 60 IN | WEIGHT: 236.4 LBS | BODY MASS INDEX: 46.41 KG/M2

## 2021-02-01 DIAGNOSIS — S83.241D ACUTE MEDIAL MENISCUS TEAR OF RIGHT KNEE, SUBSEQUENT ENCOUNTER: ICD-10-CM

## 2021-02-01 DIAGNOSIS — S83.251D BUCKET-HANDLE TEAR OF LATERAL MENISCUS OF RIGHT KNEE AS CURRENT INJURY, SUBSEQUENT ENCOUNTER: ICD-10-CM

## 2021-02-01 DIAGNOSIS — M25.561 ACUTE PAIN OF RIGHT KNEE: Primary | ICD-10-CM

## 2021-02-01 PROCEDURE — 99213 OFFICE O/P EST LOW 20 MIN: CPT | Performed by: NURSE PRACTITIONER

## 2021-02-01 NOTE — PROGRESS NOTES
"Argenis Blair is a 61 y.o. female      Chief Complaint   Patient presents with   • Results     MRI right knee       HISTORY OF PRESENT ILLNESS: Patient is a 61-year-old female who presents today for follow-up on right knee pain that has been present for approximately 7 weeks.  Around 7 weeks ago she went to lift a tote of [employer] decorations and when she turned she felt an immediate pop and pain in her right knee.  She reports pain continues to be a 3 out of 10 while sitting but unbearable with certain movements.  She has tried diclofenac, knee brace, rice therapy, modified weightbearing.  She prefers to avoid steroids due to side effects she is experienced in the past.  She continues to report that going down stairs is particularly painful.  She has significant stiffness after sitting long periods of time.  She has pain rolling over in bed at night, she also continues to have popping, locking, catching.  She is here to review MRI results.     CONCURRENT MEDICAL HISTORY:    The following portions of the patient's history were reviewed and updated as appropriate: allergies, current medications, past family history, past medical history, past social history, past surgical history and problem list.     ROS  No fevers or chills.  No chest pain or shortness of air.  No GI or  disturbances.  Right knee pain.    PHYSICAL EXAMINATION:       Ht 152.4 cm (60\")   Wt 107 kg (236 lb 6.4 oz)   BMI 46.17 kg/m²     Physical Exam  Vitals signs and nursing note reviewed.   Constitutional:       General: She is not in acute distress.     Appearance: She is well-developed. She is not toxic-appearing.   HENT:      Head: Normocephalic.   Pulmonary:      Effort: Pulmonary effort is normal. No respiratory distress.   Musculoskeletal:      Right knee: She exhibits no effusion.   Skin:     General: Skin is warm and dry.   Neurological:      Mental Status: She is alert and oriented to person, place, and time.   Psychiatric:         " Behavior: Behavior normal.         Thought Content: Thought content normal.         Judgment: Judgment normal.         GAIT:     []  Normal  []  Antalgic    Assistive device: []  None  []  Walker     []  Crutches  []  Cane     []  Wheelchair  []  Stretcher    Right Knee Exam     Tenderness   The patient is experiencing tenderness in the lateral joint line and medial joint line.    Range of Motion   Extension: -10   Flexion: 130     Other   Erythema: absent  Swelling: mild  Effusion: no effusion present      Left Knee Exam     Range of Motion   Extension: 0   Flexion: 140               Xr Knee 4+ Vw Right    Result Date: 1/21/2021  Narrative: Knee pain. Right knee, four views. Examination revealed degenerative changes manifested by joint space narrowing, periarticular osteophytes and subchondral sclerosis. No joint effusion is identified. No soft tissue abnormality is seen.     Impression: CONCLUSION: Degenerative changes. Electronically signed by:  Daniel Caicedo MD  1/21/2021 7:20 PM [facility] Workstation: [id number]    Xr Knee Bilateral Ap Standing    Result Date: 1/25/2021  Narrative: Study: XR knee bilateral AP standing Comparison: None Narrative: Bilateral knees have subtle varus positioning.  There is mild tricompartmental joint space narrowing in bilateral knees, more prominent in bilateral medial compartments.  Both tibial plateau surfaces are with sclerotic changes.  There are small marginal osteophyte formation seen throughout both knees.  No evidence of fracture or dislocation. Pippa BRO 1/25/2021    Mri Knee Right Without Contrast    Result Date: 1/28/2021  Narrative: MRI right knee. HISTORY: Injury, pain. TECHNIQUE: Multiplanar multisequence noncontrast images right knee. Prior exam: Knee x-ray January 25, 2021. FINDINGS: Small intra-articular and suprapatellar effusion. Areas of grade 3 and grade IV chondromalacia malacia, osteochondral defects patellar articular hyaline cartilage, lateral  articular facet. Complex tear posterior aspect posterior horn medial meniscus with meniscocapsular separation. Series 6 image 15. Normal anterior horn. Complex tear anterior horn lateral meniscus with possible bucket-handle abnormality i.e. portions of the meniscus displaced posteriorly. Rate three and grade IV chondromalacia  osteochondral defects distal femur posterior lateral and medial aspect. Normal intact anterior and posterior cruciate ligaments Subtle amount of bone edema distal femur medial aspect either contusion or reactive stress changes.     Impression: Grade 3 and grade IV chondromalacia  patellar articular hyaline cartilage lateral aspect. Intra-articular and suprasellar effusion. Complex tear posterior aspect posterior horn medial meniscus with meniscocapsular separation. Complex tear anterior horn lateral meniscus. Findings suggest meniscal tissue displaced posteriorly, bucket-handle abnormality. Foci of  grade 3 and grade IV chondromalacia distal femur posterior medial and lateral aspect. Bone edema distal femur medial aspect either contusion or reactive stress changes. Electronically signed by:  Michele Damon MD  1/28/2021 10:33 AM [facility] Workstation: [id number]            ASSESSMENT:    Diagnoses and all orders for this visit:    Acute pain of right knee    Acute medial meniscus tear of right knee, subsequent encounter    Bucket-handle tear of lateral meniscus of right knee as current injury, subsequent encounter          PLAN    MRI results reviewed with patient.  Options of continuing current conservative management and adding formal physical PT versus surgical options explained to patient.  Patient's BMI was discussed, patient encouraged to focus on weight loss as this will generally help with joint pain. Also discussed weight loss may be recommended prior to proceeding with surgery.  After considering options patient desires to for surgical consult.        Patient's Body mass index is 46.17 kg/m².  BMI is above normal parameters. Recommendations include: exercise counseling and nutrition counseling.  Return in about 2 weeks (around 2/15/2021).    Pippa Bustos, APRN

## 2021-02-12 ENCOUNTER — OFFICE VISIT (OUTPATIENT)
Dept: ORTHOPEDIC SURGERY | Facility: CLINIC | Age: 61
End: 2021-02-12

## 2021-02-12 VITALS — WEIGHT: 233 LBS | HEIGHT: 60 IN | BODY MASS INDEX: 45.75 KG/M2

## 2021-02-12 DIAGNOSIS — E66.01 MORBID OBESITY WITH BMI OF 45.0-49.9, ADULT (HCC): ICD-10-CM

## 2021-02-12 DIAGNOSIS — M23.91 INTERNAL DERANGEMENT OF RIGHT KNEE: ICD-10-CM

## 2021-02-12 DIAGNOSIS — S83.251D BUCKET-HANDLE TEAR OF LATERAL MENISCUS OF RIGHT KNEE AS CURRENT INJURY, SUBSEQUENT ENCOUNTER: ICD-10-CM

## 2021-02-12 DIAGNOSIS — S83.241D ACUTE MEDIAL MENISCUS TEAR OF RIGHT KNEE, SUBSEQUENT ENCOUNTER: Primary | ICD-10-CM

## 2021-02-12 DIAGNOSIS — M25.561 ACUTE PAIN OF RIGHT KNEE: ICD-10-CM

## 2021-02-12 PROBLEM — S83.241A ACUTE MEDIAL MENISCUS TEAR OF RIGHT KNEE: Status: ACTIVE | Noted: 2021-02-12

## 2021-02-12 PROBLEM — S83.251A BUCKET-HANDLE TEAR OF LATERAL MENISCUS OF RIGHT KNEE AS CURRENT INJURY: Status: ACTIVE | Noted: 2021-02-12

## 2021-02-12 PROCEDURE — 99214 OFFICE O/P EST MOD 30 MIN: CPT | Performed by: ORTHOPAEDIC SURGERY

## 2021-02-12 RX ORDER — BUPIVACAINE HCL/0.9 % NACL/PF 0.1 %
2 PLASTIC BAG, INJECTION (ML) EPIDURAL ONCE
Status: CANCELLED | OUTPATIENT
Start: 2021-03-10 | End: 2021-02-12

## 2021-02-12 NOTE — PROGRESS NOTES
Argenis Blair is a 61 y.o. female returns for     Chief Complaint   Patient presents with   • Right Knee - Follow-up       HISTORY OF PRESENT ILLNESS:  Follow up Right knee pain.  Patient was seen by Pippa on 2/1/21.  She has had MRI and xrays. She has tried Diclofenac. She prefers to avoid steroid injections due to past side effects.  Pain with activity  Pain with pivoting and twisting  Cannot do a deep knee bend  Unhappy with quality of life.  She is here to discuss definitive treatmentoptions.       CONCURRENT MEDICAL HISTORY:    Past Medical History:   Diagnosis Date   • Allergy to bee sting    • Anemia    • Asthma    • Colitis    • Depression    • Essential (primary) hypertension    • Fibrocystic breast    • Hashimoto's thyroiditis    • Hypertensive left ventricular hypertrophy    • Osteoporosis    • Osteoporosis    • PONV (postoperative nausea and vomiting)    • Primary hyperparathyroidism (CMS/HCC)     s/p parathyroidectomy      • Vitamin D deficiency        Allergies   Allergen Reactions   • Percocet [Oxycodone-Acetaminophen] Delirium   • Claritin [Loratadine]    • Keflex [Cephalexin] Nausea And Vomiting   • Sulfa Antibiotics        Current Outpatient Medications on File Prior to Visit   Medication Sig   • COLLAGEN PO Take  by mouth.   • cyclobenzaprine (FLEXERIL) 10 MG tablet Take 1 tablet by mouth 3 (Three) Times a Day As Needed for Muscle Spasms.   • diclofenac (VOLTAREN) 50 MG EC tablet Take 1 tablet by mouth 2 (Two) Times a Day As Needed (For pain). for pain   • famciclovir (Famvir) 500 MG tablet Take 3 tablets by mouth 1 (One) Time As Needed (symptom recurrence) for up to 1 dose.   • Folic Acid 5 MG capsule Take 1 capsule by mouth Daily.   • levothyroxine (Synthroid) 175 MCG tablet Take 1 tablet by mouth Daily.   • losartan-hydrochlorothiazide (HYZAAR) 50-12.5 MG per tablet Take 1 tablet by mouth Daily.   • magnesium oxide (MAGOX) 400 (241.3 Mg) MG tablet tablet Take 1 tablet by mouth Daily.   •  "ondansetron (ZOFRAN) 4 MG tablet Take 1 tablet by mouth Every 8 (Eight) Hours As Needed for Nausea or Vomiting.   • polyethylene glycol (MIRALAX) packet Take 17 g by mouth Daily.   • valACYclovir (Valtrex) 500 MG tablet Take 1 tablet by mouth Daily.   • vitamin D3 (Vitamin D) 125 MCG (5000 UT) capsule capsule Take 1 capsule by mouth Daily.     No current facility-administered medications on file prior to visit.        Past Surgical History:   Procedure Laterality Date   • BLADDER SURGERY  2004   • CHOLECYSTECTOMY  2002   • COLONOSCOPY N/A 1/19/2018    Procedure: COLONOSCOPY;  Surgeon: Samson Smith MD;  Location: [facility] ENDOSCOPY;  Service:    • DILATATION AND CURETTAGE  1986   • ENDOSCOPY N/A 1/19/2018    Procedure: ESOPHAGOGASTRODUODENOSCOPY--before follow up;  Surgeon: Samson Smith MD;  Location: [facility] ENDOSCOPY;  Service:    • HYSTERECTOMY  2004   • PARATHYROID GLAND SURGERY     • TUBAL ABDOMINAL LIGATION  1988   • UPPER GASTROINTESTINAL ENDOSCOPY  01/19/2018       Family History   Problem Relation Age of Onset   • Diabetes Mother    • Heart disease Mother    • Heart disease Father    • Heart disease Brother    • Cancer Other    • Breast cancer Other    • Hypertension Other    • Lung cancer Other    • Lung disease Other    • Breast cancer Maternal Aunt    • Breast cancer Paternal Aunt        Social History     Socioeconomic History   • Marital status:      Spouse name: Not on file   • Number of children: Not on file   • Years of education: Not on file   • Highest education level: Not on file   Tobacco Use   • Smoking status: Never Smoker   • Smokeless tobacco: Never Used   Substance and Sexual Activity   • Alcohol use: No   • Drug use: No   • Sexual activity: Defer           ROS  No fevers or chills.  No chest pain or shortness of air.  No GI or  disturbances.other than right knee pain, all other systems reviewed as negative.    PHYSICAL EXAMINATION:       Ht 152.4 cm (60\")   Wt 106 kg (233 lb)  "  BMI 45.50 kg/m²     Physical Exam  Vitals signs reviewed.   Constitutional:       General: She is not in acute distress.     Appearance: Normal appearance. She is well-developed.   Cardiovascular:      Rate and Rhythm: Normal rate and regular rhythm.      Heart sounds: Normal heart sounds.   Pulmonary:      Effort: Pulmonary effort is normal.      Breath sounds: Normal breath sounds.   Abdominal:      General: Bowel sounds are normal.      Palpations: Abdomen is soft.   Neurological:      Mental Status: She is alert and oriented to person, place, and time.   Psychiatric:         Behavior: Behavior normal.         Thought Content: Thought content normal.         Judgment: Judgment normal.         GAIT:     []  Normal  [x]  Antalgic    Assistive device: [x]  None  []  Walker     []  Crutches  []  Cane     []  Wheelchair  []  Stretcher    Right Knee Exam     Muscle Strength   The patient has normal right knee strength.    Tenderness   The patient is experiencing tenderness in the medial joint line and lateral joint line.    Tests   Ghanshyam:  Medial - positive Lateral - positive  Drawer:  Anterior - negative        Other   Erythema: absent  Sensation: normal  Pulse: present  Swelling: mild              Xr Knee 4+ Vw Right    Result Date: 1/21/2021  Narrative: Knee pain. Right knee, four views. Examination revealed degenerative changes manifested by joint space narrowing, periarticular osteophytes and subchondral sclerosis. No joint effusion is identified. No soft tissue abnormality is seen.     Impression: CONCLUSION: Degenerative changes. Electronically signed by:  Daniel Caicedo MD  1/21/2021 7:20 PM [facility] Workstation: [id number]    Xr Knee Bilateral Ap Standing    Result Date: 1/25/2021  Narrative: Study: XR knee bilateral AP standing Comparison: None Narrative: Bilateral knees have subtle varus positioning.  There is mild tricompartmental joint space narrowing in bilateral knees, more prominent in bilateral  medial compartments.  Both tibial plateau surfaces are with sclerotic changes.  There are small marginal osteophyte formation seen throughout both knees.  No evidence of fracture or dislocation. Pippa Bustos APRN 1/25/2021    Mri Knee Right Without Contrast    Result Date: 1/28/2021  Narrative: MRI right knee. HISTORY: Injury, pain. TECHNIQUE: Multiplanar multisequence noncontrast images right knee. Prior exam: Knee x-ray January 25, 2021. FINDINGS: Small intra-articular and suprapatellar effusion. Areas of grade 3 and grade IV chondromalacia malacia, osteochondral defects patellar articular hyaline cartilage, lateral articular facet. Complex tear posterior aspect posterior horn medial meniscus with meniscocapsular separation. Series 6 image 15. Normal anterior horn. Complex tear anterior horn lateral meniscus with possible bucket-handle abnormality i.e. portions of the meniscus displaced posteriorly. Rate three and grade IV chondromalacia  osteochondral defects distal femur posterior lateral and medial aspect. Normal intact anterior and posterior cruciate ligaments Subtle amount of bone edema distal femur medial aspect either contusion or reactive stress changes.     Impression: Grade 3 and grade IV chondromalacia  patellar articular hyaline cartilage lateral aspect. Intra-articular and suprasellar effusion. Complex tear posterior aspect posterior horn medial meniscus with meniscocapsular separation. Complex tear anterior horn lateral meniscus. Findings suggest meniscal tissue displaced posteriorly, bucket-handle abnormality. Foci of  grade 3 and grade IV chondromalacia distal femur posterior medial and lateral aspect. Bone edema distal femur medial aspect either contusion or reactive stress changes. Electronically signed by:  Michele Damon MD  1/28/2021 10:33 AM [facility] Workstation: [id number]            ASSESSMENT:    Diagnoses and all orders for this visit:    Acute medial meniscus tear of right knee, subsequent  encounter  -     Case Request; Standing  -     ceFAZolin (ANCEF) 2 g in sodium chloride 0.9 % 100 mL IVPB  -     Case Request    Acute pain of right knee  -     Case Request; Standing  -     ceFAZolin (ANCEF) 2 g in sodium chloride 0.9 % 100 mL IVPB  -     Case Request    Bucket-handle tear of lateral meniscus of right knee as current injury, subsequent encounter  -     Case Request; Standing  -     ceFAZolin (ANCEF) 2 g in sodium chloride 0.9 % 100 mL IVPB  -     Case Request    Internal derangement of right knee  -     Case Request; Standing  -     ceFAZolin (ANCEF) 2 g in sodium chloride 0.9 % 100 mL IVPB  -     Case Request    Morbid obesity with BMI of 45.0-49.9, adult (CMS/[facility])    Other orders  -     Follow Anesthesia Guidelines / Standing Orders; Future  -     Provide instructions to patient regarding NPO status  -     Follow Anesthesia Guidelines / Standing Orders; Standing  -     Verify NPO Status; Standing  -     POC Glucose Once; Standing  -     Clip operative site; Standing  -     Obtain informed consent (if not collected inpatient or PAT); Standing  -     Provide Patient With ERAS Hydration Instructions          PLAN    The patient voiced understanding of the risks, benefits, and alternative forms of treatment that were discussed and the patient consents to proceed with surgery.  All risks, benefits and alternatives were discussed.  Risks including but not exclusive to anesthetic complications, including death, MI, CVA, infection, bleeding DVT, fracture, residual pain and need for future surgery.    This discussion was held with the patient by Kofi Cleary MD and all questions were answered.    Plan arthroscopy of right knee with debridement of medial and lateral meniscus.        Patient's Body mass index is 45.5 kg/m². BMI is above normal parameters. Recommendations include: exercise counseling and nutrition counseling.      Return for Post-operative eval.    Kofi Cleary MD

## 2021-02-23 DIAGNOSIS — M25.532 LEFT WRIST PAIN: ICD-10-CM

## 2021-02-23 DIAGNOSIS — I10 ESSENTIAL HYPERTENSION: ICD-10-CM

## 2021-02-23 DIAGNOSIS — E03.9 HYPOTHYROIDISM, UNSPECIFIED TYPE: ICD-10-CM

## 2021-02-23 RX ORDER — LOSARTAN POTASSIUM AND HYDROCHLOROTHIAZIDE 12.5; 5 MG/1; MG/1
TABLET ORAL
Qty: 90 TABLET | Refills: 0 | Status: SHIPPED | OUTPATIENT
Start: 2021-02-23 | End: 2021-03-04

## 2021-02-23 RX ORDER — LEVOTHYROXINE SODIUM 175 UG/1
TABLET ORAL
Qty: 90 TABLET | Refills: 0 | Status: SHIPPED | OUTPATIENT
Start: 2021-02-23 | End: 2021-03-04

## 2021-03-04 ENCOUNTER — APPOINTMENT (OUTPATIENT)
Dept: PREADMISSION TESTING | Facility: HOSPITAL | Age: 61
End: 2021-03-04

## 2021-03-04 VITALS
WEIGHT: 231 LBS | RESPIRATION RATE: 20 BRPM | HEIGHT: 60 IN | SYSTOLIC BLOOD PRESSURE: 140 MMHG | OXYGEN SATURATION: 98 % | BODY MASS INDEX: 45.35 KG/M2 | HEART RATE: 99 BPM | DIASTOLIC BLOOD PRESSURE: 82 MMHG

## 2021-03-04 LAB
ANION GAP SERPL CALCULATED.3IONS-SCNC: 11 MMOL/L (ref 5–15)
BUN SERPL-MCNC: 16 MG/DL (ref 8–23)
BUN/CREAT SERPL: 18 (ref 7–25)
CALCIUM SPEC-SCNC: 9.9 MG/DL (ref 8.6–10.5)
CHLORIDE SERPL-SCNC: 102 MMOL/L (ref 98–107)
CO2 SERPL-SCNC: 29 MMOL/L (ref 22–29)
CREAT SERPL-MCNC: 0.89 MG/DL (ref 0.57–1)
GFR SERPL CREATININE-BSD FRML MDRD: 64 ML/MIN/1.73
GLUCOSE SERPL-MCNC: 106 MG/DL (ref 65–99)
POTASSIUM SERPL-SCNC: 4.3 MMOL/L (ref 3.5–5.2)
SODIUM SERPL-SCNC: 142 MMOL/L (ref 136–145)

## 2021-03-04 PROCEDURE — 36415 COLL VENOUS BLD VENIPUNCTURE: CPT

## 2021-03-04 PROCEDURE — 93010 ELECTROCARDIOGRAM REPORT: CPT | Performed by: INTERNAL MEDICINE

## 2021-03-04 PROCEDURE — 80048 BASIC METABOLIC PNL TOTAL CA: CPT

## 2021-03-04 PROCEDURE — 93005 ELECTROCARDIOGRAM TRACING: CPT

## 2021-03-04 RX ORDER — SODIUM CHLORIDE, SODIUM GLUCONATE, SODIUM ACETATE, POTASSIUM CHLORIDE, AND MAGNESIUM CHLORIDE 526; 502; 368; 37; 30 MG/100ML; MG/100ML; MG/100ML; MG/100ML; MG/100ML
1000 INJECTION, SOLUTION INTRAVENOUS CONTINUOUS
Status: CANCELLED | OUTPATIENT
Start: 2021-03-10

## 2021-03-04 RX ORDER — LEVOTHYROXINE SODIUM 175 UG/1
175 TABLET ORAL DAILY
COMMUNITY
End: 2021-04-20 | Stop reason: SDUPTHER

## 2021-03-04 NOTE — DISCHARGE INSTRUCTIONS
"Jarrod Lawrence is a 37 Natalie Hernández LPN    " [facility]  Pre-op Information and Guidelines    You will be called after 2 p.m. the day before your surgery (Friday for Monday surgery) and notified of your time for arrival and approximate surgery time.  If you have not received a call by 4P.M., please contact Same Day Surgery at [phone] of if outside [facility] call [phone].    Please Follow these Important Safety Guidelines:    • The morning of your procedure, take only the medications listed below with   A sip of water:_____________________________________________       ______________________________________________    • DO NOT eat or drink anything after 12:00 midnight the night before surgery  Specific instructions concerning drinking clear liquids will be discussed during  the pre-surgery instruction call the day before your surgery.    • If you take a blood thinner (ex. Plavix, Coumadin, aspirin), ask your doctor when to stop it before surgery  STOP DATE: _________________    • Only 2 visitors are allowed in patient rooms at a time  Your visitors will be asked to wait in the lobby until the admission process is complete with the exception of a parent with a child and patients in need of special assistance.    • YOU CANNOT DRIVE YOURSELF HOME  You must be accompanied by someone who will be responsible for driving you home after surgery and for your care at home.    • DO NOT chew gum, use breath mints, hard candy, or smoke the day of surgery  • DO NOT drink alcohol for at least 24 hours before your surgery  • DO NOT wear any jewelry and remove all body piercing before coming to the hospital  • DO NOT wear make-up to the hospital  • If you are having surgery on an extremity (arm/leg/foot) remove nail polish/artificial nails on the surgical side  • Clothing, glasses, contacts, dentures, and hairpieces must be removed before surgery  • Bathe the night before or the morning of your surgery and do not use powders/lotions on  skin.

## 2021-03-07 ENCOUNTER — LAB (OUTPATIENT)
Dept: LAB | Facility: HOSPITAL | Age: 61
End: 2021-03-07

## 2021-03-07 DIAGNOSIS — Z01.818 PREOP TESTING: Primary | ICD-10-CM

## 2021-03-07 PROCEDURE — U0004 COV-19 TEST NON-CDC HGH THRU: HCPCS

## 2021-03-07 PROCEDURE — C9803 HOPD COVID-19 SPEC COLLECT: HCPCS

## 2021-03-08 LAB — SARS-COV-2 ORF1AB RESP QL NAA+PROBE: NOT DETECTED

## 2021-03-10 ENCOUNTER — HOSPITAL ENCOUNTER (OUTPATIENT)
Facility: HOSPITAL | Age: 61
Setting detail: HOSPITAL OUTPATIENT SURGERY
Discharge: HOME | End: 2021-03-10
Attending: ORTHOPAEDIC SURGERY | Admitting: ORTHOPAEDIC SURGERY

## 2021-03-10 ENCOUNTER — ANESTHESIA EVENT (OUTPATIENT)
Dept: PERIOP | Facility: HOSPITAL | Age: 61
End: 2021-03-10

## 2021-03-10 ENCOUNTER — ANESTHESIA (OUTPATIENT)
Dept: PERIOP | Facility: HOSPITAL | Age: 61
End: 2021-03-10

## 2021-03-10 VITALS
SYSTOLIC BLOOD PRESSURE: 111 MMHG | DIASTOLIC BLOOD PRESSURE: 58 MMHG | RESPIRATION RATE: 18 BRPM | WEIGHT: 227.07 LBS | BODY MASS INDEX: 44.58 KG/M2 | HEART RATE: 97 BPM | OXYGEN SATURATION: 94 % | HEIGHT: 60 IN | TEMPERATURE: 97.8 F

## 2021-03-10 DIAGNOSIS — M25.561 ACUTE PAIN OF RIGHT KNEE: ICD-10-CM

## 2021-03-10 DIAGNOSIS — M23.91 INTERNAL DERANGEMENT OF RIGHT KNEE: ICD-10-CM

## 2021-03-10 DIAGNOSIS — S83.241D ACUTE MEDIAL MENISCUS TEAR OF RIGHT KNEE, SUBSEQUENT ENCOUNTER: Primary | ICD-10-CM

## 2021-03-10 DIAGNOSIS — S83.251D BUCKET-HANDLE TEAR OF LATERAL MENISCUS OF RIGHT KNEE AS CURRENT INJURY, SUBSEQUENT ENCOUNTER: ICD-10-CM

## 2021-03-10 PROCEDURE — 25010000002 MORPHINE PER 10 MG: Performed by: ORTHOPAEDIC SURGERY

## 2021-03-10 PROCEDURE — 25010000002 ONDANSETRON PER 1 MG: Performed by: NURSE ANESTHETIST, CERTIFIED REGISTERED

## 2021-03-10 PROCEDURE — 25010000002 CEFAZOLIN PER 500 MG: Performed by: ORTHOPAEDIC SURGERY

## 2021-03-10 PROCEDURE — 29880 ARTHRS KNE SRG MNISECTMY M&L: CPT | Performed by: ORTHOPAEDIC SURGERY

## 2021-03-10 PROCEDURE — 25010000002 FENTANYL CITRATE (PF) 100 MCG/2ML SOLUTION: Performed by: NURSE ANESTHETIST, CERTIFIED REGISTERED

## 2021-03-10 PROCEDURE — 25010000002 MIDAZOLAM PER 1 MG: Performed by: NURSE ANESTHETIST, CERTIFIED REGISTERED

## 2021-03-10 PROCEDURE — 25010000002 PROPOFOL 10 MG/ML EMULSION: Performed by: NURSE ANESTHETIST, CERTIFIED REGISTERED

## 2021-03-10 PROCEDURE — 25010000002 DEXAMETHASONE PER 1 MG: Performed by: NURSE ANESTHETIST, CERTIFIED REGISTERED

## 2021-03-10 RX ORDER — PROPOFOL 10 MG/ML
VIAL (ML) INTRAVENOUS AS NEEDED
Status: DISCONTINUED | OUTPATIENT
Start: 2021-03-10 | End: 2021-03-10 | Stop reason: SURG

## 2021-03-10 RX ORDER — ONDANSETRON 2 MG/ML
4 INJECTION INTRAMUSCULAR; INTRAVENOUS ONCE AS NEEDED
Status: DISCONTINUED | OUTPATIENT
Start: 2021-03-10 | End: 2021-03-10 | Stop reason: HOSPADM

## 2021-03-10 RX ORDER — ONDANSETRON 2 MG/ML
INJECTION INTRAMUSCULAR; INTRAVENOUS AS NEEDED
Status: DISCONTINUED | OUTPATIENT
Start: 2021-03-10 | End: 2021-03-10 | Stop reason: SURG

## 2021-03-10 RX ORDER — SCOLOPAMINE TRANSDERMAL SYSTEM 1 MG/1
1 PATCH, EXTENDED RELEASE TRANSDERMAL CONTINUOUS
Status: DISCONTINUED | OUTPATIENT
Start: 2021-03-10 | End: 2021-03-10 | Stop reason: HOSPADM

## 2021-03-10 RX ORDER — BUPIVACAINE HCL/0.9 % NACL/PF 0.1 %
2 PLASTIC BAG, INJECTION (ML) EPIDURAL ONCE
Status: COMPLETED | OUTPATIENT
Start: 2021-03-10 | End: 2021-03-10

## 2021-03-10 RX ORDER — HYDROCODONE BITARTRATE AND ACETAMINOPHEN 7.5; 325 MG/1; MG/1
1 TABLET ORAL EVERY 4 HOURS PRN
Qty: 30 TABLET | Refills: 0 | Status: SHIPPED | OUTPATIENT
Start: 2021-03-10 | End: 2021-03-23 | Stop reason: SDUPTHER

## 2021-03-10 RX ORDER — MORPHINE SULFATE 10 MG/ML
INJECTION, SOLUTION INTRAMUSCULAR; INTRAVENOUS AS NEEDED
Status: DISCONTINUED | OUTPATIENT
Start: 2021-03-10 | End: 2021-03-10 | Stop reason: HOSPADM

## 2021-03-10 RX ORDER — MIDAZOLAM HYDROCHLORIDE 1 MG/ML
INJECTION INTRAMUSCULAR; INTRAVENOUS AS NEEDED
Status: DISCONTINUED | OUTPATIENT
Start: 2021-03-10 | End: 2021-03-10 | Stop reason: SURG

## 2021-03-10 RX ORDER — FENTANYL CITRATE 50 UG/ML
INJECTION, SOLUTION INTRAMUSCULAR; INTRAVENOUS AS NEEDED
Status: DISCONTINUED | OUTPATIENT
Start: 2021-03-10 | End: 2021-03-10 | Stop reason: SURG

## 2021-03-10 RX ORDER — DEXAMETHASONE SODIUM PHOSPHATE 4 MG/ML
INJECTION, SOLUTION INTRA-ARTICULAR; INTRALESIONAL; INTRAMUSCULAR; INTRAVENOUS; SOFT TISSUE AS NEEDED
Status: DISCONTINUED | OUTPATIENT
Start: 2021-03-10 | End: 2021-03-10 | Stop reason: SURG

## 2021-03-10 RX ORDER — SODIUM CHLORIDE, SODIUM GLUCONATE, SODIUM ACETATE, POTASSIUM CHLORIDE, AND MAGNESIUM CHLORIDE 526; 502; 368; 37; 30 MG/100ML; MG/100ML; MG/100ML; MG/100ML; MG/100ML
1000 INJECTION, SOLUTION INTRAVENOUS CONTINUOUS
Status: DISCONTINUED | OUTPATIENT
Start: 2021-03-10 | End: 2021-03-10 | Stop reason: HOSPADM

## 2021-03-10 RX ORDER — BUPIVACAINE HYDROCHLORIDE 2.5 MG/ML
INJECTION, SOLUTION EPIDURAL; INFILTRATION; INTRACAUDAL AS NEEDED
Status: DISCONTINUED | OUTPATIENT
Start: 2021-03-10 | End: 2021-03-10 | Stop reason: HOSPADM

## 2021-03-10 RX ADMIN — FENTANYL CITRATE 25 MCG: 50 INJECTION INTRAMUSCULAR; INTRAVENOUS at 11:56

## 2021-03-10 RX ADMIN — Medication 2 G: at 11:53

## 2021-03-10 RX ADMIN — SCOPALAMINE 1 PATCH: 1 PATCH, EXTENDED RELEASE TRANSDERMAL at 09:35

## 2021-03-10 RX ADMIN — FENTANYL CITRATE 25 MCG: 50 INJECTION INTRAMUSCULAR; INTRAVENOUS at 12:14

## 2021-03-10 RX ADMIN — FENTANYL CITRATE 25 MCG: 50 INJECTION INTRAMUSCULAR; INTRAVENOUS at 12:01

## 2021-03-10 RX ADMIN — MIDAZOLAM HYDROCHLORIDE 2 MG: 2 INJECTION, SOLUTION INTRAMUSCULAR; INTRAVENOUS at 11:43

## 2021-03-10 RX ADMIN — SODIUM CHLORIDE, SODIUM GLUCONATE, SODIUM ACETATE, POTASSIUM CHLORIDE, AND MAGNESIUM CHLORIDE 1000 ML: 526; 502; 368; 37; 30 INJECTION, SOLUTION INTRAVENOUS at 14:49

## 2021-03-10 RX ADMIN — DEXAMETHASONE SODIUM PHOSPHATE 4 MG: 4 INJECTION, SOLUTION INTRAMUSCULAR; INTRAVENOUS at 11:43

## 2021-03-10 RX ADMIN — ONDANSETRON 4 MG: 2 INJECTION INTRAMUSCULAR; INTRAVENOUS at 11:43

## 2021-03-10 RX ADMIN — FENTANYL CITRATE 25 MCG: 50 INJECTION INTRAMUSCULAR; INTRAVENOUS at 11:59

## 2021-03-10 RX ADMIN — PROPOFOL 200 MG: 10 INJECTION, EMULSION INTRAVENOUS at 11:48

## 2021-03-10 RX ADMIN — FENTANYL CITRATE 25 MCG: 50 INJECTION INTRAMUSCULAR; INTRAVENOUS at 12:22

## 2021-03-10 RX ADMIN — FENTANYL CITRATE 25 MCG: 50 INJECTION INTRAMUSCULAR; INTRAVENOUS at 11:53

## 2021-03-10 RX ADMIN — SODIUM CHLORIDE, SODIUM GLUCONATE, SODIUM ACETATE, POTASSIUM CHLORIDE, AND MAGNESIUM CHLORIDE 1000 ML: 526; 502; 368; 37; 30 INJECTION, SOLUTION INTRAVENOUS at 09:22

## 2021-03-10 NOTE — ANESTHESIA PREPROCEDURE EVALUATION
Anesthesia Evaluation     Patient summary reviewed and Nursing notes reviewed   history of anesthetic complications: PONV  NPO Solid Status: > 4 hours  NPO Liquid Status: > 8 hours           Airway   Mallampati: III  TM distance: <3 FB  Anterior, Possible difficult intubation and Small opening  Dental - normal exam     Pulmonary     breath sounds clear to auscultation  (-) asthma, sleep apnea, rhonchi, decreased breath sounds, wheezes, not a smoker  Cardiovascular   Exercise tolerance: good (4-7 METS)    ECG reviewed  Rhythm: regular  Rate: normal    (+) hypertension well controlled less than 2 medications,   (-) pacemaker, past MI, dysrhythmias, murmur, cardiac stents, DVT      Neuro/Psych  (-) seizures, TIA, CVA  GI/Hepatic/Renal/Endo    (+) obesity, morbid obesity,  thyroid problem hypothyroidism  (-) GERD, GI bleed    Musculoskeletal     Abdominal   (+) obese,    Substance History - negative use     OB/GYN negative ob/gyn ROS   (-)  Pregnant        Other   arthritis,      ROS/Med Hx Other: BMI 44.3    Pt says hx of an echo 5 years ago- wasn't told results per pt      Phys Exam Other: Pt hyoid bone is right at 3 fingerbreaths                Anesthesia Plan    ASA 3     general   (Gonzales if intubating case)  intravenous induction     Anesthetic plan, all risks, benefits, and alternatives have been provided, discussed and informed consent has been obtained with: patient and spouse/significant other.

## 2021-03-10 NOTE — ANESTHESIA POSTPROCEDURE EVALUATION
Patient: Argenis Blair    Procedure Summary     Date: 03/10/21 Room / Location: [facility] OR  / [facility] OR    Anesthesia Start: 1143 Anesthesia Stop: 1238    Procedure: arthroscopy of right knee with debridement of medial and lateral meniscus. (Right Knee) Diagnosis:       Acute pain of right knee      Acute medial meniscus tear of right knee, subsequent encounter      Bucket-handle tear of lateral meniscus of right knee as current injury, subsequent encounter      Internal derangement of right knee      (Acute pain of right knee [M25.561])      (Acute medial meniscus tear of right knee, subsequent encounter [S83.241D])      (Bucket-handle tear of lateral meniscus of right knee as current injury, subsequent encounter [S83.251D])      (Internal derangement of right knee [M23.91])    Surgeons: Kofi Cleary MD Provider: Tomy Peguero CRNA    Anesthesia Type: general ASA Status: 3          Anesthesia Type: general    Vitals  No vitals data found for the desired time range.          Post Anesthesia Care and Evaluation    Patient location during evaluation: PACU  Level of consciousness: obtunded/minimal responses  Pain score: 0  Pain management: adequate  Airway patency: patent  Anesthetic complications: No anesthetic complications  PONV Status: none  Cardiovascular status: acceptable and hemodynamically stable  Respiratory status: acceptable, spontaneous ventilation and face mask (lma in place with simple mask lightly draped for o2 delivery)  Hydration status: acceptable

## 2021-03-10 NOTE — ADDENDUM NOTE
Addendum  created 03/10/21 1252 by Edith Domingo DO    Attestation recorded in Intraprocedure, Intraprocedure Attestations filed, Intraprocedure Staff edited

## 2021-03-10 NOTE — ANESTHESIA PROCEDURE NOTES
Airway  Urgency: elective    Date/Time: 3/10/2021 11:51 AM    General Information and Staff    Patient location during procedure: OR  CRNA: Tomy Peguero CRNA    Indications and Patient Condition  Indications for airway management: airway protection    Preoxygenated: yes  Mask difficulty assessment: 0 - not attempted    Final Airway Details  Final airway type: supraglottic airway      Successful airway: I-gel  Size 4    Number of attempts at approach: 1  Assessment: lips, teeth, and gum same as pre-op and atraumatic intubation

## 2021-03-12 ENCOUNTER — HOSPITAL ENCOUNTER (OUTPATIENT)
Dept: PHYSICAL THERAPY | Facility: HOSPITAL | Age: 61
Setting detail: THERAPIES SERIES
Discharge: HOME | End: 2021-03-12

## 2021-03-12 DIAGNOSIS — S83.241D ACUTE MEDIAL MENISCUS TEAR OF RIGHT KNEE, SUBSEQUENT ENCOUNTER: Primary | ICD-10-CM

## 2021-03-12 DIAGNOSIS — S83.251D BUCKET-HANDLE TEAR OF LATERAL MENISCUS OF RIGHT KNEE AS CURRENT INJURY, SUBSEQUENT ENCOUNTER: ICD-10-CM

## 2021-03-12 DIAGNOSIS — M23.91 INTERNAL DERANGEMENT OF RIGHT KNEE: ICD-10-CM

## 2021-03-12 PROCEDURE — 97163 PT EVAL HIGH COMPLEX 45 MIN: CPT | Performed by: PHYSICAL THERAPIST

## 2021-03-12 PROCEDURE — 97110 THERAPEUTIC EXERCISES: CPT | Performed by: PHYSICAL THERAPIST

## 2021-03-13 NOTE — THERAPY EVALUATION
Outpatient Physical Therapy Ortho Initial Evaluation  [facility]     Patient Name: Argenis Blair  : 1960  MRN: [id number]  Today's Date: 3/12/2021      Visit Date: 2021    Attendance:  (20/yr, no precert through 3/31/21)  Subjective Improvement: n/a  Next MD Appt: 3/23/21  Recert Date: 21    Therapy Diagnosis: R knee arthroscopy 3/10/21       Past Medical History:   Diagnosis Date   • Allergy to bee sting    • Anemia    • Asthma    • Colitis    • Depression    • Essential (primary) hypertension    • Fibrocystic breast    • Hashimoto's thyroiditis    • Hypertensive left ventricular hypertrophy    • Osteoporosis    • Osteoporosis    • PONV (postoperative nausea and vomiting)    • Primary hyperparathyroidism (CMS/HCC)     s/p parathyroidectomy      • Vitamin D deficiency         Past Surgical History:   Procedure Laterality Date   • BLADDER SURGERY     • CHOLECYSTECTOMY     • COLONOSCOPY N/A 2018    Procedure: COLONOSCOPY;  Surgeon: Samson Smith MD;  Location: [facility] ENDOSCOPY;  Service:    • DILATATION AND CURETTAGE     • ENDOSCOPY N/A 2018    Procedure: ESOPHAGOGASTRODUODENOSCOPY--before follow up;  Surgeon: Samson Smith MD;  Location: [facility] ENDOSCOPY;  Service:    • HYSTERECTOMY     • PARATHYROID GLAND SURGERY     • TUBAL ABDOMINAL LIGATION     • UPPER GASTROINTESTINAL ENDOSCOPY  2018     Allergies   Allergen Reactions   • Bee Venom Anaphylaxis   • Percocet [Oxycodone-Acetaminophen] Delirium   • Claritin [Loratadine]    • Keflex [Cephalexin] Nausea And Vomiting   • Sulfa Antibiotics        Visit Dx:     ICD-10-CM ICD-9-CM   1. Acute medial meniscus tear of right knee, subsequent encounter  S83.241D V58.89     836.0   2. Bucket-handle tear of lateral meniscus of right knee as current injury, subsequent encounter  S83.251D V58.89     836.1   3. Internal derangement of right knee  M23.91 717.9         Patient History     Row Name 21 1300     "         History    Chief Complaint  Difficulty Walking;Difficulty with daily activities;Pain  -SS      Type of Pain  Knee pain right  -SS      Brief Description of Current Complaint  Patient injured her right knee while getting out [place] decorations. On 3/10/21, she underwent R knee arthroscopy with debridement of medial and lateral meniscus, synovectomy, and debridement of chondral defects both medial and lateral knee. 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 She has osteoporosis. She is currently using a walker due to fracture risk. She did not use an assistive device pre-operatively.  female with children. Lives in a single story house with a ramp and 1 step at her door to enter and no stairs inside her home.   -SS      Patient/Caregiver Goals  Relieve pain;Return to prior level of function  -SS      Current Tobacco Use  no  -SS      Smoking Status  never  -SS      Patient's Rating of General Health  Good  -SS      Occupation/sports/leisure activities  Homemaker. Hobbies: \"sisters' night every Thursday\"  -SS      Surgery/Hospitalization  3/10/21 - R knee arthroscopy  -SS         Pain     Pain Location  Knee right  -SS      Pain at Present  5;6  -SS      Pain at Best  3 since surgery  -SS      Pain at Worst  8;9 since surgery  -SS      Pain Frequency  Constant/continuous  -SS      Pain Description  Throbbing;Aching  -SS      What Performance Factors Make the Current Problem(s) WORSE?  positioning, weightbearing  -SS      What Performance Factors Make the Current Problem(s) BETTER?  pain medication  -SS      Is your sleep disturbed?  Yes  -SS      Is medication used to assist with sleep?  No  -SS      Difficulties at work?  n/a  -SS      Difficulties with ADL's?  pain, limited  -SS      Difficulties with recreational activities?  pain, limited  -SS         Fall Risk Assessment    Any falls in the past year:  No  -SS      Does patient have a fear of falling  Yes " (comment)  -SS         Daily Activities    Primary Language  English  -         Safety    Are you being hurt, hit, or frightened by anyone at home or in your life?  No  -SS      Are you being neglected by a caregiver  No  -SS        User Key  (r) = Recorded By, (t) = Taken By, (c) = Cosigned By    Initials Name Provider Type    Maynor Liriano, PT DPT Physical Therapist          PT Ortho     Row Name 03/12/21 1300       Subjective Comments    Subjective Comments  see Therapy Patient History  -SS       Precautions and Contraindications    Precautions  R knee arthroscopy 3/10/21  -       Subjective Pain    Able to rate subjective pain?  yes  -SS    Pre-Treatment Pain Level  -- 5-6/10  -SS    Post-Treatment Pain Level  5  -SS       Posture/Observations    Posture/Observations Comments  Patient presents this date ambulating with rolling walker. Antalgic gait. Unable to inspect wounds due to restrictive clothing not allowing access.  -SS       Right Lower Ext    Rt Knee Extension/Flexion AROM  0-12-75 deg; pain  -SS       MMT Right Lower Ext    Rt Lower Extremity Comments   No additional extension lag with flexion SLR.   -SS       Sensation    Light Touch  No apparent deficits  -      User Key  (r) = Recorded By, (t) = Taken By, (c) = Cosigned By    Initials Name Provider Type    Maynor Liriano, PT DPT Physical Therapist          Therapy Education  Education Details: knee AROM  Given: HEP  Program: New  How Provided: Verbal, Demonstration  Provided to: Patient  Level of Understanding: Verbalized, Demonstrated     PT OP Goals     Row Name 03/12/21 1300          PT Short Term Goals    STG Date to Achieve  04/02/21  -     STG 1  Note a >/= 50% subjective improvement.  -     STG 2  LEFS score to be >/= 30/80.  -     STG 3  R knee active extension to be 0 deg.  -     STG 4  R knee active flexion to be >/= 110 deg.  -     STG 5  Ambulate with cane or less restrictive assistive device.  -         Long Term Goals    LTG Date to Achieve  04/23/21  -     LTG 1  Independent with self-management.  -     LTG 2  LEFS score to be >/= 50/80.  -     LTG 3  R knee AROM WNLs.  -     LTG 4  Demonstrate ability to ascend 5 stairs reciprocally.  -     LTG 5  Ambulate community distances without assistive device.  -        Time Calculation    PT Goal Re-Cert Due Date  04/02/21  -       User Key  (r) = Recorded By, (t) = Taken By, (c) = Cosigned By    Initials Name Provider Type    Maynor Liriano, PT DPT Physical Therapist          PT Assessment/Plan     Row Name 03/12/21 1300          PT Assessment    Functional Limitations  Impaired gait;Limitation in home management;Limitations in community activities;Limitations in functional capacity and performance;Performance in leisure activities;Performance in self-care ADL  -SS     Impairments  Balance;Gait;Integumentary integrity;Joint mobility;Pain;Range of motion  -     Assessment Comments  Patient is 2 days s/p R knee arthroscopy with medial and lateral meniscectomies, synovectomy, and cartilage debridement. Knee pain and mobility deficits at this time.  -     Rehab Potential  Good barrier: osteoporosis  -     Patient/caregiver participated in establishment of treatment plan and goals  Yes  -SS     Patient would benefit from skilled therapy intervention  Yes  -SS        PT Plan    PT Frequency  2x/week  -     Predicted Duration of Therapy Intervention (PT)  4-6 weeks  -     PT Plan Comments  Gait/stair training, knee ROM, stretching, LE strengthening, ice with or without IFC estim as needed for pain  -       User Key  (r) = Recorded By, (t) = Taken By, (c) = Cosigned By    Initials Name Provider Type    Maynor Liriano, PT DPT Physical Therapist          Modalities     Row Name 03/12/21 1300             Ice    Ice Applied  Yes  -SS      Location  R knee  -SS      Rx Minutes  -- 20 mins  -SS      Ice Prior to Rx  No  -SS       Ice S/P Rx  Yes  -SS        User Key  (r) = Recorded By, (t) = Taken By, (c) = Cosigned By    Initials Name Provider Type    Maynor Liriano, PT DPT Physical Therapist        OP Exercises     Row Name 03/12/21 1300             Subjective Comments    Subjective Comments  see Therapy Patient History  -SS         Subjective Pain    Able to rate subjective pain?  yes  -SS      Pre-Treatment Pain Level  -- 5-6/10  -SS      Post-Treatment Pain Level  5  -SS         Exercise 1    Exercise Name 1  Pro2, Seat 7, LE, rock for ROM  -SS      Cueing 1  Verbal  -      Time 1  5 mins  -SS        User Key  (r) = Recorded By, (t) = Taken By, (c) = Cosigned By    Initials Name Provider Type    Maynor Liriano, PT DPT Physical Therapist           Outcome Measure Options: Lower Extremity Functional Scale (LEFS)  Lower Extremity Functional Index  Any of your usual work, housework or school activities: Extreme difficulty or unable to perform activity  Your usual hobbies, recreational or sporting activities: Extreme difficulty or unable to perform activity  Getting into or out of the bath: Quite a bit of difficulty  Walking between rooms: Moderate difficulty  Putting on your shoes or socks: Quite a bit of difficulty  Squatting: Extreme difficulty or unable to perform activity  Lifting an object, like a bag of groceries from the floor: Extreme difficulty or unable to perform activity  Performing light activities around your home: Quite a bit of difficulty  Performing heavy activities around your home: Extreme difficulty or unable to perform activity  Getting into or out of a car: Quite a bit of difficulty  Walking 2 blocks: Extreme difficulty or unable to perform activity  Walking a mile: Extreme difficulty or unable to perform activity  Going up or down 10 stairs (about 1 flight of stairs): Extreme difficulty or unable to perform activity  Standing for 1 hour: Extreme difficulty or unable to perform activity  Sitting  for 1 hour: Moderate difficulty  Running on even ground: Extreme difficulty or unable to perform activity  Running on uneven ground: Extreme difficulty or unable to perform activity  Making sharp turns while running fast: Extreme difficulty or unable to perform activity  Hopping: Extreme difficulty or unable to perform activity  Rolling over in bed: Moderate difficulty  Total: 10      Time Calculation:     Start Time: 1301  Stop Time: 1406  Time Calculation (min): 65 min     Therapy Charges for Today     Code Description Service Date Service Provider Modifiers Qty    07203772521 HC PT EVAL HIGH COMPLEXITY 2 3/12/2021 Maynor Nguyen, PT DPT GP 1    70597745155 HC PT THER PROC EA 15 MIN 3/12/2021 Maynor Nguyen, PT DPT GP 1    05942087421 HC PT THER SUPP EA 15 MIN 3/12/2021 Maynor Nguyen, PT DPT GP 1                   Maynor Nguyen, PT, DPT, CHT  3/12/2021

## 2021-03-16 ENCOUNTER — HOSPITAL ENCOUNTER (OUTPATIENT)
Dept: PHYSICAL THERAPY | Facility: HOSPITAL | Age: 61
Setting detail: THERAPIES SERIES
Discharge: HOME | End: 2021-03-16

## 2021-03-16 DIAGNOSIS — M23.91 INTERNAL DERANGEMENT OF RIGHT KNEE: ICD-10-CM

## 2021-03-16 DIAGNOSIS — S83.241D ACUTE MEDIAL MENISCUS TEAR OF RIGHT KNEE, SUBSEQUENT ENCOUNTER: Primary | ICD-10-CM

## 2021-03-16 DIAGNOSIS — S83.251D BUCKET-HANDLE TEAR OF LATERAL MENISCUS OF RIGHT KNEE AS CURRENT INJURY, SUBSEQUENT ENCOUNTER: ICD-10-CM

## 2021-03-16 PROCEDURE — 97110 THERAPEUTIC EXERCISES: CPT | Performed by: PHYSICAL THERAPIST

## 2021-03-16 PROCEDURE — G0283 ELEC STIM OTHER THAN WOUND: HCPCS | Performed by: PHYSICAL THERAPIST

## 2021-03-16 NOTE — THERAPY TREATMENT NOTE
Outpatient Physical Therapy Ortho Treatment Note  [facility]     Patient Name: Argenis Blair  : 1960  MRN: [id number]  Today's Date: 3/16/2021      Visit Date: 2021  Attendance:  (20/yr, no precert through 3/31/21)  Subjective Improvement: none  Next MD Appt: 3/23/21  Recert Date: 21     Therapy Diagnosis: R knee arthroscopy 3/10/21     Visit Dx:    ICD-10-CM ICD-9-CM   1. Acute medial meniscus tear of right knee, subsequent encounter  S83.241D V58.89     836.0   2. Bucket-handle tear of lateral meniscus of right knee as current injury, subsequent encounter  S83.251D V58.89     836.1   3. Internal derangement of right knee  M23.91 717.9       Patient Active Problem List   Diagnosis   • Left wrist pain   • Fall   • Hypothyroidism due to Hashimoto's thyroiditis   • Grief reaction   • Essential hypertension   • Fever blister   • Acquired hypothyroidism   • Reactive depression   • Functional diarrhea   • Gastritis   • Hematochezia   • Primary insomnia   • Nausea   • Hemorrhage of anus and rectum   • Change in bowel habits   • Constipation   • Gastroesophageal reflux disease   • Family problems   • Counseling for bereavement   • Otalgia of both ears   • Acute pain of right knee   • Acute medial meniscus tear of right knee   • Bucket-handle tear of lateral meniscus of right knee as current injury   • Internal derangement of right knee        Past Medical History:   Diagnosis Date   • Allergy to bee sting    • Anemia    • Asthma    • Colitis    • Depression    • Essential (primary) hypertension    • Fibrocystic breast    • Hashimoto's thyroiditis    • Hypertensive left ventricular hypertrophy    • Osteoporosis    • Osteoporosis    • PONV (postoperative nausea and vomiting)    • Primary hyperparathyroidism (CMS/HCC)     s/p parathyroidectomy      • Vitamin D deficiency         Past Surgical History:   Procedure Laterality Date   • BLADDER SURGERY     • CHOLECYSTECTOMY     • COLONOSCOPY  N/A 1/19/2018    Procedure: COLONOSCOPY;  Surgeon: Samson Smith MD;  Location: [facility] ENDOSCOPY;  Service:    • DILATATION AND CURETTAGE  1986   • ENDOSCOPY N/A 1/19/2018    Procedure: ESOPHAGOGASTRODUODENOSCOPY--before follow up;  Surgeon: Samson Smith MD;  Location: [facility] ENDOSCOPY;  Service:    • HYSTERECTOMY  2004   • PARATHYROID GLAND SURGERY     • TUBAL ABDOMINAL LIGATION  1988   • UPPER GASTROINTESTINAL ENDOSCOPY  01/19/2018       PT Ortho     Row Name 03/16/21 0846       Precautions and Contraindications    Precautions  R knee arthroscopy 3/10/21  -BS       Subjective Pain    Able to rate subjective pain?  yes  -BS    Post-Treatment Pain Level  3  -BS       Right Lower Ext    Rt Knee Extension/Flexion AROM  0-5-118  -BS      User Key  (r) = Recorded By, (t) = Taken By, (c) = Cosigned By    Initials Name Provider Type    Christian Scott, PT Physical Therapist                      PT Assessment/Plan     Row Name 03/16/21 0846          PT Assessment    Assessment Comments  Pt with good tolerance to strengthening and mobility ex's to the R knee. No reported increase in baseline R knee pain post tx. Improved R knee ROM since the PT consult.    -BS        PT Plan    PT Frequency  2x/week  -BS     Predicted Duration of Therapy Intervention (PT)  4-6 weeks  -BS     PT Plan Comments  continue quad strengthening, add long sitting heel slides w/ strap (avoid supine position which increases pt's back pain).   -BS       User Key  (r) = Recorded By, (t) = Taken By, (c) = Cosigned By    Initials Name Provider Type    Christian Scott, PT Physical Therapist          Modalities     Row Name 03/16/21 0849             Ice    Ice Applied  Yes  -BS      Location  R knee  -BS      Rx Minutes  15 mins  -BS      Ice S/P Rx  Yes  -BS         ELECTRICAL STIMULATION    Attended/Unattended  Unattended  -BS      Stimulation Type  IFC  -BS      Location/Electrode Placement/Other  R knee  -BS       PT E-Stim Unattended  "(Manual) Minutes  15  -BS        User Key  (r) = Recorded By, (t) = Taken By, (c) = Cosigned By    Initials Name Provider Type    Christian Scott, PT Physical Therapist        OP Exercises     Row Name 03/16/21 0846             Subjective Comments    Subjective Comments  Pt reports less pain today, took a pain pill earlier this morning.   -BS         Subjective Pain    Able to rate subjective pain?  yes  -BS      Pre-Treatment Pain Level  3  -BS      Post-Treatment Pain Level  3  -BS         Exercise 1    Exercise Name 1  Pro2, Seat 7, LE, rock for ROM  -BS      Time 1  10'  -BS      Additional Comments  full revolutions  -BS         Exercise 2    Exercise Name 2  fwd lunge S  -BS      Sets 2  1  -BS      Reps 2  3  -BS      Time 2  30\" hold  -BS         Exercise 3    Exercise Name 3  incline board S  -BS      Sets 3  1  -BS      Reps 3  3  -BS      Time 3  30\" hold  -BS         Exercise 4    Exercise Name 4  sit to stand, single UE A  -BS      Sets 4  1  -BS      Reps 4  10  -BS         Exercise 5    Exercise Name 5  seated R heel slides   -BS      Sets 5  1  -BS      Reps 5  20  -BS      Additional Comments  AAROM  -BS         Exercise 6    Exercise Name 6  LAQ's, R  -BS      Sets 6  1  -BS      Reps 6  20  -BS         Exercise 7    Exercise Name 7  QS w/ heel prop  -BS      Sets 7  1  -BS      Reps 7  20  -BS         Exercise 8    Exercise Name 8  step ups, 4\"   -BS      Sets 8  1  -BS      Reps 8  10  -BS         Exercise 9    Exercise Name 9  see modalities  -BS        User Key  (r) = Recorded By, (t) = Taken By, (c) = Cosigned By    Initials Name Provider Type    Christian Scott, PT Physical Therapist                       PT OP Goals     Row Name 03/16/21 0900 03/16/21 0846       PT Short Term Goals    STG Date to Achieve  --  04/02/21  -BS    STG 1  --  Note a >/= 50% subjective improvement.  -BS    STG 2  --  LEFS score to be >/= 30/80.  -BS    STG 3  --  R knee active extension to be 0 deg.  -BS    STG " 4  --  R knee active flexion to be >/= 110 deg.  -BS    STG 5  --  Ambulate with cane or less restrictive assistive device.  -BS       Long Term Goals    LTG Date to Achieve  --  04/23/21  -BS    LTG 1  --  Independent with self-management.  -BS    LTG 2  --  LEFS score to be >/= 50/80.  -BS    LTG 3  --  R knee AROM WNLs.  -BS    LTG 4  --  Demonstrate ability to ascend 5 stairs reciprocally.  -BS    LTG 5  --  Ambulate community distances without assistive device.  -BS       Time Calculation    PT Goal Re-Cert Due Date  04/06/21  -BS  04/06/21  -BS      User Key  (r) = Recorded By, (t) = Taken By, (c) = Cosigned By    Initials Name Provider Type    Christian Scott, PT Physical Therapist                         Time Calculation:   Start Time: 0846  Stop Time: 0949  Time Calculation (min): 63 min  PT Non-Billable Time (min): 15 min  Total Timed Code Minutes- PT: 48 minute(s)  Therapy Charges for Today     Code Description Service Date Service Provider Modifiers Qty    22919108927  PT THER PROC EA 15 MIN 3/16/2021 Christian Page, PT GP 3    [id number]  PT ELECTRICAL STIM UNATTENDED 3/16/2021 Christian Page, PT  1                    Christian Page, PT  3/16/2021

## 2021-03-18 ENCOUNTER — HOSPITAL ENCOUNTER (OUTPATIENT)
Dept: PHYSICAL THERAPY | Facility: HOSPITAL | Age: 61
Setting detail: THERAPIES SERIES
Discharge: HOME | End: 2021-03-18

## 2021-03-18 DIAGNOSIS — S83.241D ACUTE MEDIAL MENISCUS TEAR OF RIGHT KNEE, SUBSEQUENT ENCOUNTER: Primary | ICD-10-CM

## 2021-03-18 DIAGNOSIS — S83.251D BUCKET-HANDLE TEAR OF LATERAL MENISCUS OF RIGHT KNEE AS CURRENT INJURY, SUBSEQUENT ENCOUNTER: ICD-10-CM

## 2021-03-18 DIAGNOSIS — M23.91 INTERNAL DERANGEMENT OF RIGHT KNEE: ICD-10-CM

## 2021-03-18 PROCEDURE — 97110 THERAPEUTIC EXERCISES: CPT

## 2021-03-18 NOTE — THERAPY TREATMENT NOTE
Outpatient Physical Therapy Ortho Treatment Note  [facility]     Patient Name: Argenis Blair  : 1960  MRN: [id number]  Today's Date: 3/18/2021      Visit Date: 2021    Visit Dx:    ICD-10-CM ICD-9-CM   1. Acute medial meniscus tear of right knee, subsequent encounter  S83.241D V58.89     836.0   2. Bucket-handle tear of lateral meniscus of right knee as current injury, subsequent encounter  S83.251D V58.89     836.1   3. Internal derangement of right knee  M23.91 717.9       Patient Active Problem List   Diagnosis   • Left wrist pain   • Fall   • Hypothyroidism due to Hashimoto's thyroiditis   • Grief reaction   • Essential hypertension   • Fever blister   • Acquired hypothyroidism   • Reactive depression   • Functional diarrhea   • Gastritis   • Hematochezia   • Primary insomnia   • Nausea   • Hemorrhage of anus and rectum   • Change in bowel habits   • Constipation   • Gastroesophageal reflux disease   • Family problems   • Counseling for bereavement   • Otalgia of both ears   • Acute pain of right knee   • Acute medial meniscus tear of right knee   • Bucket-handle tear of lateral meniscus of right knee as current injury   • Internal derangement of right knee        Past Medical History:   Diagnosis Date   • Allergy to bee sting    • Anemia    • Asthma    • Colitis    • Depression    • Essential (primary) hypertension    • Fibrocystic breast    • Hashimoto's thyroiditis    • Hypertensive left ventricular hypertrophy    • Osteoporosis    • Osteoporosis    • PONV (postoperative nausea and vomiting)    • Primary hyperparathyroidism (CMS/HCC)     s/p parathyroidectomy      • Vitamin D deficiency         Past Surgical History:   Procedure Laterality Date   • BLADDER SURGERY     • CHOLECYSTECTOMY     • COLONOSCOPY N/A 2018    Procedure: COLONOSCOPY;  Surgeon: Samson Smith MD;  Location: [facility] ENDOSCOPY;  Service:    • DILATATION AND CURETTAGE     • ENDOSCOPY N/A 2018     Procedure: ESOPHAGOGASTRODUODENOSCOPY--before follow up;  Surgeon: Samson Smith MD;  Location: [facility] ENDOSCOPY;  Service:    • HYSTERECTOMY  2004   • PARATHYROID GLAND SURGERY     • TUBAL ABDOMINAL LIGATION  1988   • UPPER GASTROINTESTINAL ENDOSCOPY  01/19/2018       PT Ortho     Row Name 03/18/21 1000       Precautions and Contraindications    Precautions  R knee arthroscopy 3/10/21  -    Contraindications  (S) No supine-increases back pain; Fibromyalgia  -KH       Subjective Pain    Post-Treatment Pain Level  0  -KH       Posture/Observations    Posture/Observations Comments  Wearing flip flops; Gait with RW  -KH       Right Lower Ext    Rt Knee Extension/Flexion AROM  0-120° AROM at end of treatment;   -      User Key  (r) = Recorded By, (t) = Taken By, (c) = Cosigned By    Initials Name Provider Type    Valeria Shah PTA Physical Therapy Assistant                      PT Assessment/Plan     Row Name 03/18/21 1000          PT Assessment    Assessment Comments  no change in HEP; Has met all but one STG at this time; Gait is good without AD, very minimal antalgia noted; Encouraged ot to continue to get off of the walker and use no AD at home unless long distances but even with them to advance no AD;   -        PT Plan    PT Frequency  2x/week  -SAURABH     Predicted Duration of Therapy Intervention (PT)  4-6 weeks  -     PT Plan Comments  Continue to progress strength and ROM w/ gait in the right knee as able. Progressing CKC Continue through next week then d/c to independnet program.   -       User Key  (r) = Recorded By, (t) = Taken By, (c) = Cosigned By    Initials Name Provider Type    Valeria Shah PTA Physical Therapy Assistant          Modalities     Row Name 03/18/21 1000             Ice    Ice Applied  Yes  -      Location  R knee  -KH      Rx Minutes  15 mins  -KH      Ice S/P Rx  Yes  -        User Key  (r) = Recorded By, (t) = Taken By, (c) = Cosigned By    Initials Name Provider Type  "   Valeria Shah PTA Physical Therapy Assistant        OP Exercises     Row Name 03/18/21 1000             Subjective Comments    Subjective Comments  patient reports that she is doing well today; states that she had to take a pain pill at 200am but otherwise doing well;   -KH         Subjective Pain    Able to rate subjective pain?  yes  -KH      Pre-Treatment Pain Level  0  -KH      Post-Treatment Pain Level  0  -KH         Exercise 1    Exercise Name 1  pro ll LE strength   -KH      Time 1  10 minis  -KH      Additional Comments  level 1; seat 8  -KH         Exercise 2    Exercise Name 2  incline stretch  -KH      Cueing 2  Verbal  -KH      Sets 2  3  -KH      Time 2  30\" hold  -KH         Exercise 3    Exercise Name 3  standing hamstring stretch  -KH      Cueing 3  Verbal  -KH      Sets 3  3  -KH      Time 3  30\"   -KH         Exercise 4    Exercise Name 4  CR/TR  -KH      Cueing 4  Verbal  -KH      Sets 4  2  -KH      Reps 4  10  -KH         Exercise 5    Exercise Name 5  step ups fwd  -KH      Cueing 5  Verbal  -KH      Sets 5  2  -KH      Reps 5  10  -KH      Additional Comments  4\" step   -KH         Exercise 6    Exercise Name 6  LAQ  -KH      Sets 6  2  -KH      Reps 6  10  -KH         Exercise 7    Exercise Name 7  ham curls  -KH      Cueing 7  Verbal  -KH      Sets 7  2  -KH      Reps 7  10  -KH      Additional Comments  Red Tband  -KH         Exercise 8    Exercise Name 8  Checked ROM  -KH        User Key  (r) = Recorded By, (t) = Taken By, (c) = Cosigned By    Initials Name Provider Type    Valeria Shah PTA Physical Therapy Assistant                       PT OP Goals     Row Name 03/18/21 1000          PT Short Term Goals    STG Date to Achieve  04/02/21  -     STG 1  Note a >/= 50% subjective improvement.  -KH     STG 1 Progress  (S) Met  -     STG 1 Progress Comments  (S) 75%  -     STG 2  LEFS score to be >/= 30/80.  -     STG 3  R knee active extension to be 0 deg.  -     STG 3 " Progress  (S) Met  -     STG 4  R knee active flexion to be >/= 110 deg.  -     STG 4 Progress  (S) Met  -     STG 5  Ambulate with cane or less restrictive assistive device.  -     STG 5 Progress  (S) Met  -        Long Term Goals    LTG Date to Achieve  04/23/21  -     LTG 1  Independent with self-management.  -     LTG 2  LEFS score to be >/= 50/80.  -     LTG 3  R knee AROM WNLs.  -     LTG 3 Progress  (S) Met  -     LTG 4  Demonstrate ability to ascend 5 stairs reciprocally.  -     LTG 5  Ambulate community distances without assistive device.  -     LTG 5 Progress  (S) Met  -        Time Calculation    PT Goal Re-Cert Due Date  04/06/21  -       User Key  (r) = Recorded By, (t) = Taken By, (c) = Cosigned By    Initials Name Provider Type    Valeria Shah PTA Physical Therapy Assistant                         Time Calculation:   Start Time: 1016  Stop Time: 1115  Time Calculation (min): 59 min  Total Timed Code Minutes- PT: 44 minute(s)  Therapy Charges for Today     Code Description Service Date Service Provider Modifiers Qty    02196948262 HC PT THER PROC EA 15 MIN 3/18/2021 Valeria Marrufo PTA GP 3    21806299667 HC PT THER SUPP EA 15 MIN 3/18/2021 Valeria Marrufo PTA GP 1                    Valeria Marrufo PTA  3/18/2021

## 2021-03-21 LAB
QT INTERVAL: 366 MS
QTC INTERVAL: 464 MS

## 2021-03-23 ENCOUNTER — OFFICE VISIT (OUTPATIENT)
Dept: ORTHOPEDIC SURGERY | Facility: CLINIC | Age: 61
End: 2021-03-23

## 2021-03-23 ENCOUNTER — HOSPITAL ENCOUNTER (OUTPATIENT)
Dept: PHYSICAL THERAPY | Facility: HOSPITAL | Age: 61
Setting detail: THERAPIES SERIES
Discharge: HOME | End: 2021-03-23

## 2021-03-23 DIAGNOSIS — M23.91 INTERNAL DERANGEMENT OF RIGHT KNEE: ICD-10-CM

## 2021-03-23 DIAGNOSIS — Z98.890 STATUS POST ARTHROSCOPIC SURGERY OF RIGHT KNEE: Primary | ICD-10-CM

## 2021-03-23 DIAGNOSIS — S83.241D ACUTE MEDIAL MENISCUS TEAR OF RIGHT KNEE, SUBSEQUENT ENCOUNTER: ICD-10-CM

## 2021-03-23 DIAGNOSIS — S83.241D ACUTE MEDIAL MENISCUS TEAR OF RIGHT KNEE, SUBSEQUENT ENCOUNTER: Primary | ICD-10-CM

## 2021-03-23 DIAGNOSIS — S83.251D BUCKET-HANDLE TEAR OF LATERAL MENISCUS OF RIGHT KNEE AS CURRENT INJURY, SUBSEQUENT ENCOUNTER: ICD-10-CM

## 2021-03-23 PROCEDURE — 99024 POSTOP FOLLOW-UP VISIT: CPT | Performed by: NURSE PRACTITIONER

## 2021-03-23 PROCEDURE — 97110 THERAPEUTIC EXERCISES: CPT

## 2021-03-23 RX ORDER — HYDROCODONE BITARTRATE AND ACETAMINOPHEN 7.5; 325 MG/1; MG/1
1 TABLET ORAL EVERY 6 HOURS PRN
Qty: 20 TABLET | Refills: 0 | Status: SHIPPED | OUTPATIENT
Start: 2021-03-23 | End: 2021-03-28

## 2021-03-23 NOTE — THERAPY DISCHARGE NOTE
Outpatient Physical Therapy Ortho Treatment Note/Discharge Summary  [facility]     Patient Name: Argenis Blair  : 1960  MRN: [id number]  Today's Date: 3/23/2021      Visit Date: 2021     Attendance:  (20/yr, no precert through 3/31/21)  Subjective Improvement: 95%  Next MD Appt: 2021  Recert Date: 21     Therapy Diagnosis: R knee arthroscopy 3/10/21    Visit Dx:    ICD-10-CM ICD-9-CM   1. Acute medial meniscus tear of right knee, subsequent encounter  S83.241D V58.89     836.0   2. Internal derangement of right knee  M23.91 717.9   3. Bucket-handle tear of lateral meniscus of right knee as current injury, subsequent encounter  S83.251D V58.89     836.1       Patient Active Problem List   Diagnosis   • Left wrist pain   • Fall   • Hypothyroidism due to Hashimoto's thyroiditis   • Grief reaction   • Essential hypertension   • Fever blister   • Acquired hypothyroidism   • Reactive depression   • Functional diarrhea   • Gastritis   • Hematochezia   • Primary insomnia   • Nausea   • Hemorrhage of anus and rectum   • Change in bowel habits   • Constipation   • Gastroesophageal reflux disease   • Family problems   • Counseling for bereavement   • Otalgia of both ears   • Acute pain of right knee   • Acute medial meniscus tear of right knee   • Bucket-handle tear of lateral meniscus of right knee as current injury   • Internal derangement of right knee   • Status post arthroscopic surgery of right knee        Past Medical History:   Diagnosis Date   • Allergy to bee sting    • Anemia    • Asthma    • Colitis    • Depression    • Essential (primary) hypertension    • Fibrocystic breast    • Hashimoto's thyroiditis    • Hypertensive left ventricular hypertrophy    • Osteoporosis    • Osteoporosis    • PONV (postoperative nausea and vomiting)    • Primary hyperparathyroidism (CMS/HCC)     s/p parathyroidectomy      • Vitamin D deficiency         Past Surgical History:   Procedure  Laterality Date   • BLADDER SURGERY  2004   • CHOLECYSTECTOMY  2002   • COLONOSCOPY N/A 1/19/2018    Procedure: COLONOSCOPY;  Surgeon: Samson Smith MD;  Location: [facility] ENDOSCOPY;  Service:    • DILATATION AND CURETTAGE  1986   • ENDOSCOPY N/A 1/19/2018    Procedure: ESOPHAGOGASTRODUODENOSCOPY--before follow up;  Surgeon: Samson Smith MD;  Location: [facility] ENDOSCOPY;  Service:    • HYSTERECTOMY  2004   • KNEE ARTHROSCOPY Right 3/10/2021    Procedure: arthroscopy of right knee with debridement of medial and lateral meniscus.;  Surgeon: Kofi Cleary MD;  Location: [facility] OR;  Service: Orthopedics;  Laterality: Right;   • PARATHYROID GLAND SURGERY     • TUBAL ABDOMINAL LIGATION  1988   • UPPER GASTROINTESTINAL ENDOSCOPY  01/19/2018       PT Ortho     Row Name 03/23/21 1100       Precautions and Contraindications    Precautions  R knee arthroscopy 3/10/21  -    Contraindications  No supine-increases back pain; Fibromyalgia  -       Posture/Observations    Posture/Observations Comments  wearing flip flops; sterri strips intact over portals   -       Right Lower Ext    Rt Knee Extension/Flexion AROM  0-125° AROM  -      User Key  (r) = Recorded By, (t) = Taken By, (c) = Cosigned By    Initials Name Provider Type    Valeria Shah PTA Physical Therapy Assistant                      PT Assessment/Plan     Row Name 03/23/21 1100          PT Assessment    Assessment Comments  continues to progress well with strength and stability of the right knee; Doing well with HEP; Swelling still present but improved; Has met all goals at this time;   -        PT Plan    PT Frequency  2x/week  -     Predicted Duration of Therapy Intervention (PT)  4-6 weeks  -     PT Plan Comments  D/C to independent program and HEP at this time per pt request  -       User Key  (r) = Recorded By, (t) = Taken By, (c) = Cosigned By    Initials Name Provider Type    Valeria Shah PTA Physical Therapy Assistant     "      Modalities     Row Name 03/23/21 1100             Subjective Pain    Post-Treatment Pain Level  1  -KH        User Key  (r) = Recorded By, (t) = Taken By, (c) = Cosigned By    Initials Name Provider Type    Valeria Shah PTA Physical Therapy Assistant          OP Exercises     Row Name 03/23/21 1100             Subjective Comments    Subjective Comments  Patient reports that she went to the MD and pleased with progress; Not having much pain today but did have one day where she had increased pain because she was so tired so had to use walker but otherwise doing well;   -KH         Subjective Pain    Able to rate subjective pain?  yes  -KH      Pre-Treatment Pain Level  2  -KH      Post-Treatment Pain Level  1  -KH         Exercise 1    Exercise Name 1  pro ll LE strength  -KH      Time 1  10 mins  -KH      Additional Comments  level 2; seat 8  -KH         Exercise 2    Exercise Name 2  incline stretch  -KH      Cueing 2  Verbal  -KH      Sets 2  3  -KH      Time 2  30\" hold  -KH         Exercise 3    Exercise Name 3  standing hamstring stretch  -KH      Cueing 3  Verbal  -KH      Sets 3  3  -KH      Time 3  30\"   -KH         Exercise 4    Exercise Name 4  CR/TR  -KH      Cueing 4  Verbal  -KH      Sets 4  2  -KH      Reps 4  10  -KH         Exercise 5    Exercise Name 5  step ups fwd  -KH      Cueing 5  Verbal  -KH      Sets 5  2  -KH      Reps 5  10  -KH      Additional Comments  4\" step   -KH         Exercise 6    Exercise Name 6  step ups lat  -KH      Sets 6  2  -KH      Reps 6  10  -KH      Additional Comments  4\" step   -KH         Exercise 7    Exercise Name 7  seated add squeeze  -KH      Cueing 7  Verbal  -KH      Sets 7  2  -KH      Reps 7  10  -KH      Time 7  5\" holds  -KH         Exercise 8    Exercise Name 8  sit to stands  -KH      Cueing 8  Verbal  -KH      Sets 8  1  -KH      Reps 8  10  -KH        User Key  (r) = Recorded By, (t) = Taken By, (c) = Cosigned By    Initials Name Provider Type "    Valeria Shah PTA Physical Therapy Assistant                         PT OP Goals     Row Name 03/23/21 1100          PT Short Term Goals    STG Date to Achieve  04/02/21  -     STG 1  Note a >/= 50% subjective improvement.  -     STG 1 Progress  Met  -     STG 2  LEFS score to be >/= 30/80.  -     STG 2 Progress  Met  -     STG 3  R knee active extension to be 0 deg.  -     STG 3 Progress  Met  -     STG 4  R knee active flexion to be >/= 110 deg.  -     STG 4 Progress  Met  -     STG 5  Ambulate with cane or less restrictive assistive device.  -     STG 5 Progress  Met  -        Long Term Goals    LTG Date to Achieve  04/23/21  -     LTG 1  Independent with self-management.  -     LTG 1 Progress  Met  -     LTG 2  LEFS score to be >/= 50/80.  -     LTG 2 Progress  Met  -     LTG 3  R knee AROM WNLs.  -     LTG 3 Progress  Met  -     LTG 4  Demonstrate ability to ascend 5 stairs reciprocally.  -     LTG 4 Progress  Met  -     LTG 5  Ambulate community distances without assistive device.  -     LTG 5 Progress  Met  -        Time Calculation    PT Goal Re-Cert Due Date  04/06/21  -       User Key  (r) = Recorded By, (t) = Taken By, (c) = Cosigned By    Initials Name Provider Type    Valeria Shah PTA Physical Therapy Assistant               Outcome Measure Options: Lower Extremity Functional Scale (LEFS)  Lower Extremity Functional Index  Any of your usual work, housework or school activities: No difficulty  Your usual hobbies, recreational or sporting activities: No difficulty  Getting into or out of the bath: A little bit of difficulty  Walking between rooms: No difficulty  Putting on your shoes or socks: No difficulty  Squatting: Moderate difficulty  Lifting an object, like a bag of groceries from the floor: No difficulty  Performing light activities around your home: No difficulty  Performing heavy activities around your home: A little bit of difficulty  Getting  into or out of a car: No difficulty  Walking 2 blocks: No difficulty  Walking a mile: Moderate difficulty  Going up or down 10 stairs (about 1 flight of stairs): A little bit of difficulty  Standing for 1 hour: A little bit of difficulty  Sitting for 1 hour: No difficulty  Running on even ground: Extreme difficulty or unable to perform activity  Running on uneven ground: Extreme difficulty or unable to perform activity  Making sharp turns while running fast: Extreme difficulty or unable to perform activity  Hopping: Extreme difficulty or unable to perform activity  Rolling over in bed: No difficulty  Total: 56      Time Calculation:   Start Time: 1145  Stop Time: 1230  Time Calculation (min): 45 min  Total Timed Code Minutes- PT: 45 minute(s)  Therapy Charges for Today     Code Description Service Date Service Provider Modifiers Qty    54290136003  PT THER PROC EA 15 MIN 3/23/2021 Valeria Marrufo PTA GP 3          PT G-Codes  Outcome Measure Options: Lower Extremity Functional Scale (LEFS)  Total: 56     OP PT Discharge Summary  Date of Discharge: 03/23/21  Reason for Discharge: All goals achieved, Independent  Outcomes Achieved: Able to achieve all goals within established timeline  Discharge Destination: Home with home program      Valeria Marrufo PTA  3/23/2021

## 2021-03-23 NOTE — PROGRESS NOTES
Knee Scope follow Up 1st Visit      Patient: Argenis Blair    YOB: 1960      Chief Complaint   Patient presents with   • Right Knee - Post-op   • Suture / Staple Removal     Date of surgery:    03/10/21 (13d) Kofi Cleary MD   arthroscopy of right knee with debridement of medial and lateral meniscus. - Right         History of Present Illness: Patient is a 61-year-old female who presents today for postop evaluation after undergoing arthroscopy of right knee with debridement of medial and lateral meniscus on 3/10/2021 by Dr. Cleary.  Patient is 13 days postop.  Patient denies fever, nausea, vomiting.  She denies issues with incision site.  She reports that she is still having some pain and she does request refill of pain medication, however patient reports that her mechanical symptoms have much improved after surgery.  She still has some residual swelling.  She has been seeing physical therapy as prescribed.        Allergies:   Allergies   Allergen Reactions   • Bee Venom Anaphylaxis   • Percocet [Oxycodone-Acetaminophen] Delirium   • Claritin [Loratadine]    • Keflex [Cephalexin] Nausea And Vomiting   • Sulfa Antibiotics        Medications:   Current Outpatient Medications   Medication Sig Dispense Refill   • COLLAGEN PO Take 1 tablet by mouth Daily.     • cyclobenzaprine (FLEXERIL) 10 MG tablet Take 1 tablet by mouth 3 (Three) Times a Day As Needed for Muscle Spasms. 90 tablet 3   • diclofenac (VOLTAREN) 50 MG EC tablet Take 50 mg by mouth 2 (Two) Times a Day As Needed.     • famciclovir (Famvir) 500 MG tablet Take 3 tablets by mouth 1 (One) Time As Needed (symptom recurrence) for up to 1 dose. 3 tablet 2   • Folic Acid 5 MG capsule Take 1 capsule by mouth Daily. 30 each 2   • HYDROcodone-acetaminophen (NORCO) 7.5-325 MG per tablet Take 1 tablet by mouth Every 6 (Six) Hours As Needed for Moderate Pain  (Pain) for up to 5 days. 20 tablet 0   • levothyroxine (SYNTHROID, LEVOTHROID) 175 MCG  tablet Take 175 mcg by mouth Daily.     • losartan 50 MG tablet 50 mg, hydroCHLOROthiazide 12.5 MG 12.5 mg Take 1 dose by mouth Daily.     • magnesium oxide (MAGOX) 400 (241.3 Mg) MG tablet tablet Take 1 tablet by mouth Daily. 30 each 5   • ondansetron (ZOFRAN) 4 MG tablet Take 1 tablet by mouth Every 8 (Eight) Hours As Needed for Nausea or Vomiting. 10 tablet 0   • polyethylene glycol (MIRALAX) packet Take 17 g by mouth Daily. 30 each 0   • valACYclovir (Valtrex) 500 MG tablet Take 1 tablet by mouth Daily. 30 tablet 3   • vitamin D3 (Vitamin D) 125 MCG (5000 UT) capsule capsule Take 1 capsule by mouth Daily. 30 capsule 11     No current facility-administered medications for this visit.           Physical Exam: 61 y.o. female  General Appearance:    Alert, cooperative, in no acute distress                Patient is alert and oriented ×3, no acute distress, normal mood and affect.    Physical exam of the knee: incision is healing appropriately, calf is soft and non-tender.  No sign or sx of DVT.  Incision site is clean, dry, intact and well approximated.  No evidence of infection.  Range of motion of right knee:  Extension: 0  Flexion:130        Diagnoses and all orders for this visit:    Status post arthroscopic surgery of right knee    Acute medial meniscus tear of right knee, subsequent encounter  -     HYDROcodone-acetaminophen (NORCO) 7.5-325 MG per tablet; Take 1 tablet by mouth Every 6 (Six) Hours As Needed for Moderate Pain  (Pain) for up to 5 days.    Bucket-handle tear of lateral meniscus of right knee as current injury, subsequent encounter  -     HYDROcodone-acetaminophen (NORCO) 7.5-325 MG per tablet; Take 1 tablet by mouth Every 6 (Six) Hours As Needed for Moderate Pain  (Pain) for up to 5 days.    Internal derangement of right knee  -     HYDROcodone-acetaminophen (NORCO) 7.5-325 MG per tablet; Take 1 tablet by mouth Every 6 (Six) Hours As Needed for Moderate Pain  (Pain) for up to 5 days.        S/P  knee scope.         Plan: Patient seems to be progressing appropriately.  Incision site care explained to patient.  Patient verbalized understanding.  Sutures removed and Steri-Strips placed.  Patient to continue with physical therapy and slowly progress activity and range of motion as tolerated.  Patient to continue rice therapy for swelling control.  Patient given refill of hydrocodone after discussing risk, benefits, alternatives.  Internal [place] reviewed through [facility] PDMP.  Patient to return in 4 weeks for recheck or sooner if needed.    03/23/21 at 10:23 CDT by DIEUDONNE Merchant

## 2021-03-25 ENCOUNTER — APPOINTMENT (OUTPATIENT)
Dept: PHYSICAL THERAPY | Facility: HOSPITAL | Age: 61
End: 2021-03-25

## 2021-03-29 ENCOUNTER — APPOINTMENT (OUTPATIENT)
Dept: PHYSICAL THERAPY | Facility: HOSPITAL | Age: 61
End: 2021-03-29

## 2021-03-31 ENCOUNTER — APPOINTMENT (OUTPATIENT)
Dept: PHYSICAL THERAPY | Facility: HOSPITAL | Age: 61
End: 2021-03-31

## 2021-04-20 ENCOUNTER — OFFICE VISIT (OUTPATIENT)
Dept: FAMILY MEDICINE CLINIC | Facility: CLINIC | Age: 61
End: 2021-04-20

## 2021-04-20 VITALS
HEART RATE: 105 BPM | OXYGEN SATURATION: 98 % | WEIGHT: 234 LBS | SYSTOLIC BLOOD PRESSURE: 130 MMHG | HEIGHT: 60 IN | BODY MASS INDEX: 45.94 KG/M2 | TEMPERATURE: 97.3 F | DIASTOLIC BLOOD PRESSURE: 82 MMHG

## 2021-04-20 DIAGNOSIS — E03.9 HYPOTHYROIDISM, UNSPECIFIED TYPE: ICD-10-CM

## 2021-04-20 DIAGNOSIS — S83.200D: Primary | ICD-10-CM

## 2021-04-20 PROCEDURE — 99213 OFFICE O/P EST LOW 20 MIN: CPT | Performed by: STUDENT IN AN ORGANIZED HEALTH CARE EDUCATION/TRAINING PROGRAM

## 2021-04-20 RX ORDER — BETAMETHASONE DIPROPIONATE 0.5 MG/G
CREAM TOPICAL 2 TIMES DAILY
Qty: 45 G | Refills: 0 | Status: SHIPPED | OUTPATIENT
Start: 2021-04-20

## 2021-04-20 RX ORDER — MAGNESIUM OXIDE 400 MG/1
1 TABLET ORAL DAILY
COMMUNITY
Start: 2021-03-25 | End: 2021-12-27 | Stop reason: SDUPTHER

## 2021-04-20 RX ORDER — LEVOTHYROXINE SODIUM 175 UG/1
175 TABLET ORAL DAILY
Qty: 30 TABLET | Refills: 2 | Status: SHIPPED | OUTPATIENT
Start: 2021-04-20 | End: 2021-07-21

## 2021-04-20 NOTE — PROGRESS NOTES
Subjective   Argenis Blair is a 61 y.o. female who presents for follow up for right knee medial and lateral meniscus tear. Had surgery performed by Dr. Cleary 3/10/2021. States doing much better today. Would laso like refill on hypothyroid medications. Pt also complaining of itchy lesions on hands that she has previously treated with Triamcinolone ointment which is no longer working.       Past Medical Hx:  Past Medical History:   Diagnosis Date   • Allergy to bee sting    • Anemia    • Asthma    • Colitis    • Depression    • Essential (primary) hypertension    • Fibrocystic breast    • Hashimoto's thyroiditis    • Hypertensive left ventricular hypertrophy    • Osteoporosis    • Osteoporosis    • PONV (postoperative nausea and vomiting)    • Primary hyperparathyroidism (CMS/HCC)     s/p parathyroidectomy      • Vitamin D deficiency        Past Surgical Hx:  Past Surgical History:   Procedure Laterality Date   • BLADDER SURGERY  2004   • CHOLECYSTECTOMY  2002   • COLONOSCOPY N/A 1/19/2018    Procedure: COLONOSCOPY;  Surgeon: Samson Smith MD;  Location: [facility] ENDOSCOPY;  Service:    • DILATATION AND CURETTAGE  1986   • ENDOSCOPY N/A 1/19/2018    Procedure: ESOPHAGOGASTRODUODENOSCOPY--before follow up;  Surgeon: Samson Smith MD;  Location: [facility] ENDOSCOPY;  Service:    • HYSTERECTOMY  2004   • KNEE ARTHROSCOPY Right 3/10/2021    Procedure: arthroscopy of right knee with debridement of medial and lateral meniscus.;  Surgeon: Kofi Cleary MD;  Location: [facility] OR;  Service: Orthopedics;  Laterality: Right;   • PARATHYROID GLAND SURGERY     • TUBAL ABDOMINAL LIGATION  1988   • UPPER GASTROINTESTINAL ENDOSCOPY  01/19/2018       Health Maintenance:  Health Maintenance   Topic Date Due   • COVID-19 Vaccine (1) Never done   • ZOSTER VACCINE (1 of 2) Never done   • ANNUAL PHYSICAL  08/15/2019   • MAMMOGRAM  08/14/2021   • DXA SCAN  08/14/2021   • COLONOSCOPY  01/19/2023   • TDAP/TD  VACCINES (2 - Td) 11/09/2027   • HEPATITIS C SCREENING  Completed   • Pneumococcal Vaccine 0-64  Aged Out   • INFLUENZA VACCINE  Discontinued   • PAP SMEAR  Discontinued       Current Meds:    Current Outpatient Medications:   •  COLLAGEN PO, Take 1 tablet by mouth Daily., Disp: , Rfl:   •  cyclobenzaprine (FLEXERIL) 10 MG tablet, Take 1 tablet by mouth 3 (Three) Times a Day As Needed for Muscle Spasms., Disp: 90 tablet, Rfl: 3  •  diclofenac (VOLTAREN) 50 MG EC tablet, Take 1 tablet by mouth twice daily as needed for pain, Disp: 60 tablet, Rfl: 0  •  famciclovir (Famvir) 500 MG tablet, Take 3 tablets by mouth 1 (One) Time As Needed (symptom recurrence) for up to 1 dose., Disp: 3 tablet, Rfl: 2  •  Folic Acid 5 MG capsule, Take 1 capsule by mouth Daily., Disp: 30 each, Rfl: 2  •  levothyroxine (SYNTHROID, LEVOTHROID) 175 MCG tablet, Take 1 tablet by mouth Daily., Disp: 30 tablet, Rfl: 2  •  losartan 50 MG tablet 50 mg, hydroCHLOROthiazide 12.5 MG 12.5 mg, Take 1 dose by mouth Daily., Disp: , Rfl:   •  magnesium oxide (MAG-OX) 400 MG tablet, Take 1 tablet by mouth Daily., Disp: , Rfl:   •  ondansetron (ZOFRAN) 4 MG tablet, Take 1 tablet by mouth Every 8 (Eight) Hours As Needed for Nausea or Vomiting., Disp: 10 tablet, Rfl: 0  •  polyethylene glycol (MIRALAX) packet, Take 17 g by mouth Daily., Disp: 30 each, Rfl: 0  •  valACYclovir (Valtrex) 500 MG tablet, Take 1 tablet by mouth Daily., Disp: 30 tablet, Rfl: 3  •  vitamin D3 (Vitamin D) 125 MCG (5000 UT) capsule capsule, Take 1 capsule by mouth Daily., Disp: 30 capsule, Rfl: 11    Allergies:  Bee venom, Percocet [oxycodone-acetaminophen], Claritin [loratadine], Keflex [cephalexin], and Sulfa antibiotics    Family Hx:  Family History   Problem Relation Age of Onset   • Diabetes Mother    • Heart disease Mother    • Heart disease Father    • Heart disease Brother    • Cancer Other    • Breast cancer Other    • Hypertension Other    • Lung cancer Other    • Lung disease  "Other    • Breast cancer Maternal Aunt    • Breast cancer Paternal Aunt         Social History:  Social History     Socioeconomic History   • Marital status:      Spouse name: Not on file   • Number of children: Not on file   • Years of education: Not on file   • Highest education level: Not on file   Tobacco Use   • Smoking status: Never Smoker   • Smokeless tobacco: Never Used   Vaping Use   • Vaping Use: Never used   Substance and Sexual Activity   • Alcohol use: No   • Drug use: No   • Sexual activity: Defer       Review of Systems  Review of Systems   Constitutional: Negative for activity change, chills, diaphoresis and fever.   HENT: Negative for dental problem, drooling, ear discharge, ear pain, facial swelling, mouth sores, nosebleeds, rhinorrhea, sinus pressure, sinus pain, sneezing and sore throat.    Eyes: Negative for pain, discharge and visual disturbance.   Respiratory: Negative for apnea, cough, shortness of breath, wheezing and stridor.    Cardiovascular: Negative for chest pain, palpitations and leg swelling.   Gastrointestinal: Negative for abdominal distention, abdominal pain, constipation, diarrhea, nausea and vomiting.   Genitourinary: Negative for dysuria, frequency and urgency.   Musculoskeletal: Negative for arthralgias and back pain.   Skin: Negative for rash and wound.        2 1cm raised erythematous lesion of right hand   Neurological: Negative for dizziness, facial asymmetry, light-headedness and headaches.   Psychiatric/Behavioral: Negative for agitation and decreased concentration. The patient is not nervous/anxious.             Objective:     /82   Pulse 105   Temp 97.3 °F (36.3 °C)   Ht 152.4 cm (60\")   Wt 106 kg (234 lb)   SpO2 98%   BMI 45.70 kg/m²       Physical Exam  Constitutional:       Appearance: She is well-developed.   HENT:      Head: Normocephalic and atraumatic.      Right Ear: External ear normal.      Left Ear: External ear normal.      Nose: Nose " normal.      Mouth/Throat:      Pharynx: No oropharyngeal exudate.   Eyes:      General: No scleral icterus.        Right eye: No discharge.         Left eye: No discharge.      Conjunctiva/sclera: Conjunctivae normal.      Pupils: Pupils are equal, round, and reactive to light.   Neck:      Vascular: No JVD.   Cardiovascular:      Rate and Rhythm: Normal rate and regular rhythm.      Heart sounds: Normal heart sounds. No murmur heard.   No friction rub. No gallop.    Pulmonary:      Effort: Pulmonary effort is normal. No respiratory distress.      Breath sounds: Normal breath sounds. No stridor. No wheezing or rales.   Abdominal:      General: Bowel sounds are normal. There is no distension.      Palpations: Abdomen is soft.      Tenderness: There is no abdominal tenderness.   Musculoskeletal:         General: No tenderness or deformity. Normal range of motion.      Cervical back: Normal range of motion and neck supple.   Skin:     General: Skin is warm and dry.      Capillary Refill: Capillary refill takes less than 2 seconds.      Coloration: Skin is not pale.      Findings: No erythema or rash.   Neurological:      Mental Status: She is alert and oriented to person, place, and time.   Psychiatric:         Behavior: Behavior normal.         Assessment/Plan:     Diagnoses and all orders for this visit:    1. Bucket handle tear of meniscus of right knee, unspecified meniscus, unspecified whether old or current tear, subsequent encounter (Primary)    2. Hypothyroidism, unspecified type  -     levothyroxine (SYNTHROID, LEVOTHROID) 175 MCG tablet; Take 1 tablet by mouth Daily.  Dispense: 30 tablet; Refill: 2     Pt has doen very well after surgery. States pain is markedly improved. Returning to normal walking pattern. No signs/symptoms of hypothyroidism, well managed on current medication.     Follow-up:     Return in about 3 months (around 7/20/2021).    Goals     • Less pain     • Less sadness/anxiety      Barriers:  patient does not wish for treatment of her grief            Preventative:    Vaccines Recommended at this visit:   No Vaccines recommended today. Patient is up to date on all vaccines.     Vaccines Received at this visit:  No Vaccines recommended today. Patient is up to date on all vaccines.     Screenings Recommended at this visit:  No Screenings offered today. Patient is up to date on all screenings at this time.     Screenings Ordered at this visit:  No screenings were offered today. Patient is up to date on all screenings.     Smoking Status:  Patient has never smoked.    Alcohol Intake:  Patient does not drink    Patient's Body mass index is 45.7 kg/m². BMI is above normal parameters. Recommendations include: exercise counseling and nutrition counseling.         RISK SCORE: 3          This document has been electronically signed by Juliann Segovia MD on April 20, 2021 14:24 CDT

## 2021-04-22 ENCOUNTER — OFFICE VISIT (OUTPATIENT)
Dept: ORTHOPEDIC SURGERY | Facility: CLINIC | Age: 61
End: 2021-04-22

## 2021-04-22 VITALS — BODY MASS INDEX: 45.75 KG/M2 | WEIGHT: 233 LBS | HEIGHT: 60 IN

## 2021-04-22 DIAGNOSIS — S83.251D BUCKET-HANDLE TEAR OF LATERAL MENISCUS OF RIGHT KNEE AS CURRENT INJURY, SUBSEQUENT ENCOUNTER: ICD-10-CM

## 2021-04-22 DIAGNOSIS — M23.91 INTERNAL DERANGEMENT OF RIGHT KNEE: ICD-10-CM

## 2021-04-22 DIAGNOSIS — Z98.890 STATUS POST ARTHROSCOPIC SURGERY OF RIGHT KNEE: Primary | ICD-10-CM

## 2021-04-22 PROCEDURE — 99024 POSTOP FOLLOW-UP VISIT: CPT | Performed by: ORTHOPAEDIC SURGERY

## 2021-04-22 NOTE — PROGRESS NOTES
Argenis Blair is a 61 y.o. female is s/p  arthroscopy of right knee with debridement of medial and lateral meniscus. - Right     Chief Complaint   Patient presents with   • Post-op     Right knee arthroscopy       HISTORY OF PRESENT ILLNESS: Patient is here today s/p arthroscopy of right knee with debridement of medial and lateral meniscus. - Right on 3/10/21. She states that her pain is 3/10. Doing much better than before surgery  No fever or chills      Allergies   Allergen Reactions   • Bee Venom Anaphylaxis   • Percocet [Oxycodone-Acetaminophen] Delirium   • Claritin [Loratadine]    • Keflex [Cephalexin] Nausea And Vomiting   • Sulfa Antibiotics          Current Outpatient Medications:   •  betamethasone dipropionate 0.05 % cream, Apply  topically to the appropriate area as directed 2 (Two) Times a Day., Disp: 45 g, Rfl: 0  •  COLLAGEN PO, Take 1 tablet by mouth Daily., Disp: , Rfl:   •  cyclobenzaprine (FLEXERIL) 10 MG tablet, Take 1 tablet by mouth 3 (Three) Times a Day As Needed for Muscle Spasms., Disp: 90 tablet, Rfl: 3  •  diclofenac (VOLTAREN) 50 MG EC tablet, Take 1 tablet by mouth twice daily as needed for pain, Disp: 60 tablet, Rfl: 0  •  famciclovir (Famvir) 500 MG tablet, Take 3 tablets by mouth 1 (One) Time As Needed (symptom recurrence) for up to 1 dose., Disp: 3 tablet, Rfl: 2  •  Folic Acid 5 MG capsule, Take 1 capsule by mouth Daily., Disp: 30 each, Rfl: 2  •  levothyroxine (SYNTHROID, LEVOTHROID) 175 MCG tablet, Take 1 tablet by mouth Daily., Disp: 30 tablet, Rfl: 2  •  losartan 50 MG tablet 50 mg, hydroCHLOROthiazide 12.5 MG 12.5 mg, Take 1 dose by mouth Daily., Disp: , Rfl:   •  magnesium oxide (MAG-OX) 400 MG tablet, Take 1 tablet by mouth Daily., Disp: , Rfl:   •  ondansetron (ZOFRAN) 4 MG tablet, Take 1 tablet by mouth Every 8 (Eight) Hours As Needed for Nausea or Vomiting., Disp: 10 tablet, Rfl: 0  •  polyethylene glycol (MIRALAX) packet, Take 17 g by mouth Daily., Disp: 30 each, Rfl:  0  •  valACYclovir (Valtrex) 500 MG tablet, Take 1 tablet by mouth Daily., Disp: 30 tablet, Rfl: 3  •  vitamin D3 (Vitamin D) 125 MCG (5000 UT) capsule capsule, Take 1 capsule by mouth Daily., Disp: 30 capsule, Rfl: 11    No fevers or chills.  No nausea or vomiting.      PHYSICAL EXAMINATION:       Argenis Blair is a 61 y.o. female    Patient is awake and alert, answers questions appropriately and is in no apparent distress.    GAIT:     []  Normal  [x]  Antalgic    Assistive device: [x]  None  []  Walker     []  Crutches  []  Cane     []  Wheelchair  []  Stretcher    Right Knee Exam     Comments:  Good motion   Mild swelliing  Good distal pulses and sensation                      ASSESSMENT:    Diagnoses and all orders for this visit:    Status post arthroscopic surgery of right knee    Internal derangement of right knee    Bucket-handle tear of lateral meniscus of right knee as current injury, subsequent encounter          PLAN    Continue general strength and conditioning exercises.  Slowly progress activity as tolerated.  No restrictions.  Follow-up as needed.     Patient's Body mass index is 45.5 kg/m². BMI is above normal parameters. Recommendations include: exercise counseling and nutrition counseling.  Return if symptoms worsen or fail to improve, for recheck.    Kofi Cleary MD

## 2021-04-22 NOTE — PROGRESS NOTES
I have seen the patient.  I have reviewed the notes, assessments, and/or procedures performed by Juliann Segovia MD, I concur with her/his documentation and assessment and plan for Argenis Gabriela Johnnie.               This document has been electronically signed by Marcin Hardy MD on April 22, 2021 09:59 CDT

## 2021-06-18 RX ORDER — FOLIC ACID 5 MG
1 CAPSULE ORAL DAILY
Qty: 90 EACH | Refills: 3 | Status: SHIPPED | OUTPATIENT
Start: 2021-06-18 | End: 2022-01-12 | Stop reason: SDUPTHER

## 2021-07-14 DIAGNOSIS — B00.1 FEVER BLISTER: ICD-10-CM

## 2021-07-14 RX ORDER — VALACYCLOVIR HYDROCHLORIDE 500 MG/1
500 TABLET, FILM COATED ORAL DAILY
Qty: 30 TABLET | Refills: 3 | Status: SHIPPED | OUTPATIENT
Start: 2021-07-14 | End: 2021-12-23 | Stop reason: SDUPTHER

## 2021-07-20 ENCOUNTER — OFFICE VISIT (OUTPATIENT)
Dept: FAMILY MEDICINE CLINIC | Facility: CLINIC | Age: 61
End: 2021-07-20

## 2021-07-20 ENCOUNTER — LAB (OUTPATIENT)
Dept: LAB | Facility: HOSPITAL | Age: 61
End: 2021-07-20

## 2021-07-20 VITALS
BODY MASS INDEX: 44.65 KG/M2 | OXYGEN SATURATION: 99 % | HEIGHT: 60 IN | TEMPERATURE: 97.5 F | DIASTOLIC BLOOD PRESSURE: 80 MMHG | WEIGHT: 227.4 LBS | HEART RATE: 94 BPM | SYSTOLIC BLOOD PRESSURE: 120 MMHG

## 2021-07-20 DIAGNOSIS — E03.8 HYPOTHYROIDISM DUE TO HASHIMOTO'S THYROIDITIS: Primary | ICD-10-CM

## 2021-07-20 DIAGNOSIS — M81.0 OSTEOPOROSIS WITHOUT CURRENT PATHOLOGICAL FRACTURE, UNSPECIFIED OSTEOPOROSIS TYPE: ICD-10-CM

## 2021-07-20 DIAGNOSIS — I10 ESSENTIAL HYPERTENSION: ICD-10-CM

## 2021-07-20 DIAGNOSIS — Z71.89 COUNSELING FOR BEREAVEMENT: ICD-10-CM

## 2021-07-20 DIAGNOSIS — Z98.890 STATUS POST ARTHROSCOPIC SURGERY OF RIGHT KNEE: ICD-10-CM

## 2021-07-20 DIAGNOSIS — E06.3 HYPOTHYROIDISM DUE TO HASHIMOTO'S THYROIDITIS: Primary | ICD-10-CM

## 2021-07-20 LAB
T4 FREE SERPL-MCNC: 1.67 NG/DL (ref 0.93–1.7)
TSH SERPL DL<=0.05 MIU/L-ACNC: 2.81 UIU/ML (ref 0.27–4.2)

## 2021-07-20 PROCEDURE — 99213 OFFICE O/P EST LOW 20 MIN: CPT | Performed by: STUDENT IN AN ORGANIZED HEALTH CARE EDUCATION/TRAINING PROGRAM

## 2021-07-20 PROCEDURE — 84443 ASSAY THYROID STIM HORMONE: CPT | Performed by: STUDENT IN AN ORGANIZED HEALTH CARE EDUCATION/TRAINING PROGRAM

## 2021-07-20 PROCEDURE — 36415 COLL VENOUS BLD VENIPUNCTURE: CPT | Performed by: STUDENT IN AN ORGANIZED HEALTH CARE EDUCATION/TRAINING PROGRAM

## 2021-07-20 PROCEDURE — 84439 ASSAY OF FREE THYROXINE: CPT | Performed by: STUDENT IN AN ORGANIZED HEALTH CARE EDUCATION/TRAINING PROGRAM

## 2021-07-20 NOTE — PROGRESS NOTES
"  Family Medicine Residency  Artemio Caceres MD    Subjective:     Argenis Blair is a 61 y.o. female with a concurrent medical history of Hashimoto's thyroiditis, osteoporosis, reactive depression, and hypertension who presents for her 3-month follow-up concerning her recent meniscus injury, hypertension, and hypothyroidism.    Patient reports that she is following up with Dr. Cleary who performed arthroscopy recently for her meniscus injury.  Patient reports pain is improved as well as mobility.  Patient complains of some residual swelling in the knee but affirms that this is normal post procedure.    Patient reports that she has a blood pressure cuff at home and her readings are in the 120s to 130s systolic and 80s diastolic.  Patient reports compliance with her current medications and denies any adverse effects.  Patient reports headaches when her blood pressure becomes elevated.  Denies any changes in vision, chest pain, shortness of breath, or lightheadedness.    Patient reports compliance with her Synthroid medication but feels that it may not be working as well anymore.  Patient expresses excessive fatigue, constipation, hair loss, sensitivity to cold, and \"feels off\".  Patient reports having problems sleeping which are exacerbated by her busy lifestyle of caring for her mother and other family members.     Patient also still thinks about her son who  4 years ago and has residual grief from this.  Patient reports previously taking various antidepressant medications and seeing behavioral health but denies that she needs any more of this at this time.  She reports antidepressant medications did not work for her.  Patient denies any suicidal ideation or plan.  Patient offered to fill out PHQ-9 but refused.      The following portions of the patient's history were reviewed and updated as appropriate: allergies, current medications, past family history, past medical history, past social history, past " surgical history and problem list.    Past Medical Hx:  Past Medical History:   Diagnosis Date   • Allergy to bee sting    • Anemia    • Asthma    • Colitis    • Depression    • Essential (primary) hypertension    • Fibrocystic breast    • Hashimoto's thyroiditis    • Hypertensive left ventricular hypertrophy    • Osteoporosis    • Osteoporosis    • PONV (postoperative nausea and vomiting)    • Primary hyperparathyroidism (CMS/HCC)     s/p parathyroidectomy      • Vitamin D deficiency        Past Surgical Hx:  Past Surgical History:   Procedure Laterality Date   • BLADDER SURGERY  2004   • CHOLECYSTECTOMY  2002   • COLONOSCOPY N/A 1/19/2018    Procedure: COLONOSCOPY;  Surgeon: Samson Smith MD;  Location: [facility] ENDOSCOPY;  Service:    • DILATATION AND CURETTAGE  1986   • ENDOSCOPY N/A 1/19/2018    Procedure: ESOPHAGOGASTRODUODENOSCOPY--before follow up;  Surgeon: Samson Smith MD;  Location: [facility] ENDOSCOPY;  Service:    • HYSTERECTOMY  2004   • KNEE ARTHROSCOPY Right 3/10/2021    Procedure: arthroscopy of right knee with debridement of medial and lateral meniscus.;  Surgeon: Kofi Cleary MD;  Location: [facility] OR;  Service: Orthopedics;  Laterality: Right;   • PARATHYROID GLAND SURGERY     • TUBAL ABDOMINAL LIGATION  1988   • UPPER GASTROINTESTINAL ENDOSCOPY  01/19/2018       Current Meds:    Current Outpatient Medications:   •  betamethasone dipropionate 0.05 % cream, Apply  topically to the appropriate area as directed 2 (Two) Times a Day., Disp: 45 g, Rfl: 0  •  COLLAGEN PO, Take 1 tablet by mouth Daily., Disp: , Rfl:   •  cyclobenzaprine (FLEXERIL) 10 MG tablet, Take 1 tablet by mouth 3 (Three) Times a Day As Needed for Muscle Spasms., Disp: 90 tablet, Rfl: 3  •  diclofenac (VOLTAREN) 50 MG EC tablet, Take 1 tablet by mouth twice daily as needed for pain, Disp: 60 tablet, Rfl: 0  •  famciclovir (Famvir) 500 MG tablet, Take 3 tablets by mouth 1 (One) Time As Needed (symptom recurrence) for up to  1 dose., Disp: 3 tablet, Rfl: 2  •  Folic Acid 5 MG capsule, Take 1 capsule by mouth Daily., Disp: 90 each, Rfl: 3  •  levothyroxine (SYNTHROID, LEVOTHROID) 175 MCG tablet, Take 1 tablet by mouth Daily., Disp: 30 tablet, Rfl: 2  •  losartan 50 MG tablet 50 mg, hydroCHLOROthiazide 12.5 MG 12.5 mg, Take 1 dose by mouth Daily., Disp: , Rfl:   •  magnesium oxide (MAG-OX) 400 MG tablet, Take 1 tablet by mouth Daily., Disp: , Rfl:   •  ondansetron (ZOFRAN) 4 MG tablet, Take 1 tablet by mouth Every 8 (Eight) Hours As Needed for Nausea or Vomiting., Disp: 10 tablet, Rfl: 0  •  polyethylene glycol (MIRALAX) packet, Take 17 g by mouth Daily., Disp: 30 each, Rfl: 0  •  valACYclovir (Valtrex) 500 MG tablet, Take 1 tablet by mouth Daily., Disp: 30 tablet, Rfl: 3  •  vitamin D3 (Vitamin D) 125 MCG (5000 UT) capsule capsule, Take 1 capsule by mouth Daily., Disp: 30 capsule, Rfl: 11  •  denosumab (PROLIA) 60 MG/ML solution prefilled syringe syringe, Inject 1 mL under the skin into the appropriate area as directed 1 (One) Time for 1 dose., Disp: 180 mL, Rfl: 1    Allergies:  Allergies   Allergen Reactions   • Bee Venom Anaphylaxis   • Percocet [Oxycodone-Acetaminophen] Delirium   • Claritin [Loratadine]    • Keflex [Cephalexin] Nausea And Vomiting   • Sulfa Antibiotics        Family Hx:  Family History   Problem Relation Age of Onset   • Diabetes Mother    • Heart disease Mother    • Heart disease Father    • Heart disease Brother    • Cancer Other    • Breast cancer Other    • Hypertension Other    • Lung cancer Other    • Lung disease Other    • Breast cancer Maternal Aunt    • Breast cancer Paternal Aunt         Social History:  Social History     Socioeconomic History   • Marital status:      Spouse name: Not on file   • Number of children: Not on file   • Years of education: Not on file   • Highest education level: Not on file   Tobacco Use   • Smoking status: Never Smoker   • Smokeless tobacco: Never Used   Vaping Use  "  • Vaping Use: Never used   Substance and Sexual Activity   • Alcohol use: No   • Drug use: No   • Sexual activity: Defer       Review of Systems  Review of Systems   Constitutional: Positive for fatigue.   Respiratory: Negative for chest tightness and shortness of breath.    Cardiovascular: Negative for chest pain.   Gastrointestinal: Positive for constipation. Negative for abdominal pain and blood in stool.   Endocrine: Positive for cold intolerance.   Genitourinary: Negative for difficulty urinating.   Musculoskeletal: Positive for joint swelling.   Neurological: Positive for headaches. Negative for light-headedness.   Psychiatric/Behavioral: Positive for sleep disturbance. Negative for suicidal ideas.       Objective:     /80   Pulse 94   Temp 97.5 °F (36.4 °C)   Ht 152.4 cm (60\")   Wt 103 kg (227 lb 6.4 oz)   SpO2 99%   BMI 44.41 kg/m²   Physical Exam  HENT:      Head: Normocephalic and atraumatic.   Neck:      Thyroid: No thyroid mass, thyromegaly or thyroid tenderness.   Cardiovascular:      Rate and Rhythm: Normal rate and regular rhythm.      Pulses: Normal pulses.           Radial pulses are 2+ on the right side and 2+ on the left side.        Dorsalis pedis pulses are 2+ on the right side and 2+ on the left side.        Posterior tibial pulses are 2+ on the right side and 2+ on the left side.      Heart sounds: Normal heart sounds.   Pulmonary:      Effort: Pulmonary effort is normal.      Breath sounds: Normal breath sounds.   Abdominal:      Palpations: Abdomen is soft.      Tenderness: There is no abdominal tenderness.   Musculoskeletal:      Right knee: Swelling present.   Skin:     General: Skin is warm and dry.   Neurological:      Mental Status: She is alert and oriented to person, place, and time.   Psychiatric:         Mood and Affect: Affect is tearful.         Behavior: Behavior is cooperative.      Comments: Patient became tearful when discussing her  son.         "   Assessment/Plan:     Diagnoses and all orders for this visit:    1. Hypothyroidism due to Hashimoto's thyroiditis (Primary)  -     TSH  -     T4, free  - We will call patient with results.  Will adjust Synthroid dosage if necessary.    2. Status post arthroscopic surgery of right knee  - Dr. Suárez following.  We will continue to monitor.    3. Osteoporosis without current pathological fracture, unspecified osteoporosis type  -     denosumab (PROLIA) 60 MG/ML solution prefilled syringe syringe; Inject 1 mL under the skin into the appropriate area as directed 1 (One) Time for 1 dose.  Dispense: 180 mL; Refill: 1  - Patient was counseled on starting osteoporosis treatment.  We decided to go with denosumab due to patient's history of gastritis potentially causing issues with bisphosphonate therapy.  Patient was agreeable to denosumab treatment.  We will continue to monitor and adjust therapy accordingly.    4. Essential hypertension  -Stable on current medical therapy.  We will continue to monitor and adjust treatment accordingly.    5. Counseling for bereavement  -Patient was offered referral to behavioral health but refused.  Patient denied wanting any antidepressant medication at this time.  Will continue to monitor and offer therapy as needed.  Patient was counseled that if anything changes or symptoms get worse she can call or come see us for help.      · Rx changes: Add denosumab 60 mg/mL  · Patient Education: Patient educated on osteoporosis treatment  · Compliance at present is estimated to be good.   · Efforts to improve compliance (if necessary) will be directed at Improving mood.    Depression screening: Patient refused PHQ-9.    Follow-up:     Return in about 3 months (around 10/20/2021).    Preventative:  Health Maintenance   Topic Date Due   • COVID-19 Vaccine (1) Never done   • ZOSTER VACCINE (1 of 2) Never done   • ANNUAL PHYSICAL  08/15/2019   • MAMMOGRAM  08/14/2021   • DXA SCAN  08/14/2021   •  COLORECTAL CANCER SCREENING  01/19/2023   • TDAP/TD VACCINES (2 - Td or Tdap) 11/09/2027   • HEPATITIS C SCREENING  Completed   • Pneumococcal Vaccine 0-64  Aged Out   • INFLUENZA VACCINE  Discontinued   • PAP SMEAR  Discontinued     Female Preventative: Last mammogram was 8/14/19  Recommended: Covid  Vaccine Counseling: Patient advised to get Covid vaccine.  She says she is planning to get it in the future soon.  Printed out educational information for patient and gave it to her.    Weight  -Class: Obese Class III extreme obesity: > or equal to 40kg/m2  -Patient's Body mass index is 44.41 kg/m². indicating that she is morbidly obese (BMI > 40 or > 35 with obesity - related health condition). Obesity-related health conditions include the following: hypertension. Obesity is unchanged.increase physical activity    Alcohol use:  reports no history of alcohol use.  Nicotine status  reports that she has never smoked. She has never used smokeless tobacco.    Goals     • Less pain     • Less sadness/anxiety      Barriers: patient does not wish for treatment of her grief             RISK SCORE: 3      This document has been electronically signed by Artemio Caceres MD on July 20, 2021 16:54 CDT

## 2021-07-21 RX ORDER — LEVOTHYROXINE SODIUM 0.2 MG/1
200 TABLET ORAL DAILY
Qty: 30 TABLET | Refills: 3 | Status: SHIPPED | OUTPATIENT
Start: 2021-07-21 | End: 2021-12-23 | Stop reason: SDUPTHER

## 2021-07-21 NOTE — TELEPHONE ENCOUNTER
PT CALLED REQUESTING A REFILL OF diclofenac (VOLTAREN) 50 MG EC tablet SENT TO [facility] IN [place]. PT ALSO STATED THAT SHE WANTS THE NAME BRAND FOR HER SYNTHROID SO SHE NEEDS IT TO SAY 'NO SUBSTITUTIONS.'      HER CALL BACK NUMBER [phone]

## 2021-12-22 ENCOUNTER — TELEPHONE (OUTPATIENT)
Dept: FAMILY MEDICINE CLINIC | Facility: CLINIC | Age: 61
End: 2021-12-22

## 2021-12-23 DIAGNOSIS — E03.8 HYPOTHYROIDISM DUE TO HASHIMOTO'S THYROIDITIS: ICD-10-CM

## 2021-12-23 DIAGNOSIS — B00.1 FEVER BLISTER: ICD-10-CM

## 2021-12-23 DIAGNOSIS — E06.3 HYPOTHYROIDISM DUE TO HASHIMOTO'S THYROIDITIS: ICD-10-CM

## 2021-12-23 RX ORDER — VALACYCLOVIR HYDROCHLORIDE 500 MG/1
500 TABLET, FILM COATED ORAL DAILY
Qty: 30 TABLET | Refills: 3 | Status: SHIPPED | OUTPATIENT
Start: 2021-12-23 | End: 2022-01-12 | Stop reason: SDUPTHER

## 2021-12-23 RX ORDER — LEVOTHYROXINE SODIUM 0.2 MG/1
200 TABLET ORAL DAILY
Qty: 30 TABLET | Refills: 3 | Status: SHIPPED | OUTPATIENT
Start: 2021-12-23 | End: 2022-01-12 | Stop reason: SDUPTHER

## 2021-12-27 DIAGNOSIS — M62.830 BACK MUSCLE SPASM: ICD-10-CM

## 2021-12-27 DIAGNOSIS — M25.561 ACUTE PAIN OF RIGHT KNEE: Primary | ICD-10-CM

## 2021-12-27 RX ORDER — CYCLOBENZAPRINE HCL 10 MG
10 TABLET ORAL 3 TIMES DAILY PRN
Qty: 60 TABLET | Refills: 0 | Status: SHIPPED | OUTPATIENT
Start: 2021-12-27 | End: 2021-12-27 | Stop reason: SDUPTHER

## 2021-12-27 RX ORDER — CYCLOBENZAPRINE HCL 10 MG
10 TABLET ORAL 3 TIMES DAILY PRN
Qty: 90 TABLET | Refills: 0 | Status: SHIPPED | OUTPATIENT
Start: 2021-12-27 | End: 2022-01-12 | Stop reason: SDUPTHER

## 2021-12-27 RX ORDER — MAGNESIUM OXIDE 400 MG/1
1 TABLET ORAL DAILY
Qty: 30 TABLET | Refills: 3 | Status: SHIPPED | OUTPATIENT
Start: 2021-12-27 | End: 2022-01-12 | Stop reason: SDUPTHER

## 2021-12-30 ENCOUNTER — TELEPHONE (OUTPATIENT)
Dept: FAMILY MEDICINE CLINIC | Facility: CLINIC | Age: 61
End: 2021-12-30

## 2021-12-30 DIAGNOSIS — I10 ESSENTIAL HYPERTENSION: Primary | ICD-10-CM

## 2021-12-30 NOTE — TELEPHONE ENCOUNTER
Incoming Refill Request      Medication requested (name and dose): losartan 50 MG tablet 50 mg, hydroCHLOROthiazide 12.5 MG 12.5 mg    Pharmacy where request should be sent: [facility] in [place]    Additional details provided by patient:  Made apt for jan 12    Best call back number: [phone]    Does the patient have less than a 3 day supply:  [x] Yes  [] No    Noemy Roman  12/30/21, 13:21 CST

## 2022-01-12 ENCOUNTER — OFFICE VISIT (OUTPATIENT)
Dept: FAMILY MEDICINE CLINIC | Facility: CLINIC | Age: 62
End: 2022-01-12

## 2022-01-12 ENCOUNTER — LAB (OUTPATIENT)
Dept: LAB | Facility: HOSPITAL | Age: 62
End: 2022-01-12

## 2022-01-12 VITALS
SYSTOLIC BLOOD PRESSURE: 136 MMHG | HEART RATE: 69 BPM | HEIGHT: 60 IN | WEIGHT: 230.4 LBS | OXYGEN SATURATION: 99 % | DIASTOLIC BLOOD PRESSURE: 88 MMHG | BODY MASS INDEX: 45.23 KG/M2

## 2022-01-12 DIAGNOSIS — E83.42 HYPOMAGNESEMIA: ICD-10-CM

## 2022-01-12 DIAGNOSIS — E55.9 VITAMIN D DEFICIENCY: ICD-10-CM

## 2022-01-12 DIAGNOSIS — E06.3 HYPOTHYROIDISM DUE TO HASHIMOTO'S THYROIDITIS: ICD-10-CM

## 2022-01-12 DIAGNOSIS — M79.7 FIBROMYALGIA: ICD-10-CM

## 2022-01-12 DIAGNOSIS — M81.0 OSTEOPOROSIS WITHOUT CURRENT PATHOLOGICAL FRACTURE, UNSPECIFIED OSTEOPOROSIS TYPE: ICD-10-CM

## 2022-01-12 DIAGNOSIS — B00.1 FEVER BLISTER: ICD-10-CM

## 2022-01-12 DIAGNOSIS — J06.9 VIRAL URI: ICD-10-CM

## 2022-01-12 DIAGNOSIS — E03.8 HYPOTHYROIDISM DUE TO HASHIMOTO'S THYROIDITIS: ICD-10-CM

## 2022-01-12 DIAGNOSIS — K12.1 ORAL ULCER: ICD-10-CM

## 2022-01-12 DIAGNOSIS — I10 ESSENTIAL HYPERTENSION: Primary | ICD-10-CM

## 2022-01-12 LAB
25(OH)D3 SERPL-MCNC: 38.8 NG/ML (ref 30–100)
ALBUMIN SERPL-MCNC: 4.7 G/DL (ref 3.5–5.2)
ALBUMIN/GLOB SERPL: 2 G/DL
ALP SERPL-CCNC: 69 U/L (ref 39–117)
ALT SERPL W P-5'-P-CCNC: 19 U/L (ref 1–33)
ANION GAP SERPL CALCULATED.3IONS-SCNC: 10.8 MMOL/L (ref 5–15)
AST SERPL-CCNC: 19 U/L (ref 1–32)
BASOPHILS # BLD AUTO: 0.05 10*3/MM3 (ref 0–0.2)
BASOPHILS NFR BLD AUTO: 0.6 % (ref 0–1.5)
BILIRUB SERPL-MCNC: 0.5 MG/DL (ref 0–1.2)
BUN SERPL-MCNC: 16 MG/DL (ref 8–23)
BUN/CREAT SERPL: 16.8 (ref 7–25)
CALCIUM SPEC-SCNC: 9.4 MG/DL (ref 8.6–10.5)
CHLORIDE SERPL-SCNC: 106 MMOL/L (ref 98–107)
CHOLEST SERPL-MCNC: 173 MG/DL (ref 0–200)
CO2 SERPL-SCNC: 24.2 MMOL/L (ref 22–29)
CREAT SERPL-MCNC: 0.95 MG/DL (ref 0.57–1)
DEPRECATED RDW RBC AUTO: 44 FL (ref 37–54)
EOSINOPHIL # BLD AUTO: 0.23 10*3/MM3 (ref 0–0.4)
EOSINOPHIL NFR BLD AUTO: 2.8 % (ref 0.3–6.2)
ERYTHROCYTE [DISTWIDTH] IN BLOOD BY AUTOMATED COUNT: 13 % (ref 12.3–15.4)
GFR SERPL CREATININE-BSD FRML MDRD: 60 ML/MIN/1.73
GLOBULIN UR ELPH-MCNC: 2.3 GM/DL
GLUCOSE SERPL-MCNC: 120 MG/DL (ref 65–99)
HCT VFR BLD AUTO: 44 % (ref 34–46.6)
HDLC SERPL-MCNC: 41 MG/DL (ref 40–60)
HGB BLD-MCNC: 15.3 G/DL (ref 12–15.9)
IMM GRANULOCYTES # BLD AUTO: 0.03 10*3/MM3 (ref 0–0.05)
IMM GRANULOCYTES NFR BLD AUTO: 0.4 % (ref 0–0.5)
LDLC SERPL CALC-MCNC: 93 MG/DL (ref 0–100)
LDLC/HDLC SERPL: 2.1 {RATIO}
LYMPHOCYTES # BLD AUTO: 2.75 10*3/MM3 (ref 0.7–3.1)
LYMPHOCYTES NFR BLD AUTO: 32.9 % (ref 19.6–45.3)
MAGNESIUM SERPL-MCNC: 2.3 MG/DL (ref 1.6–2.4)
MCH RBC QN AUTO: 32.6 PG (ref 26.6–33)
MCHC RBC AUTO-ENTMCNC: 34.8 G/DL (ref 31.5–35.7)
MCV RBC AUTO: 93.6 FL (ref 79–97)
MONOCYTES # BLD AUTO: 0.61 10*3/MM3 (ref 0.1–0.9)
MONOCYTES NFR BLD AUTO: 7.3 % (ref 5–12)
NEUTROPHILS NFR BLD AUTO: 4.69 10*3/MM3 (ref 1.7–7)
NEUTROPHILS NFR BLD AUTO: 56 % (ref 42.7–76)
NRBC BLD AUTO-RTO: 0 /100 WBC (ref 0–0.2)
PLATELET # BLD AUTO: 272 10*3/MM3 (ref 140–450)
PMV BLD AUTO: 10.9 FL (ref 6–12)
POTASSIUM SERPL-SCNC: 4.5 MMOL/L (ref 3.5–5.2)
PROT SERPL-MCNC: 7 G/DL (ref 6–8.5)
RBC # BLD AUTO: 4.7 10*6/MM3 (ref 3.77–5.28)
SODIUM SERPL-SCNC: 141 MMOL/L (ref 136–145)
TRIGL SERPL-MCNC: 230 MG/DL (ref 0–150)
VLDLC SERPL-MCNC: 39 MG/DL (ref 5–40)
WBC NRBC COR # BLD: 8.36 10*3/MM3 (ref 3.4–10.8)

## 2022-01-12 PROCEDURE — 80061 LIPID PANEL: CPT | Performed by: FAMILY MEDICINE

## 2022-01-12 PROCEDURE — 36415 COLL VENOUS BLD VENIPUNCTURE: CPT | Performed by: STUDENT IN AN ORGANIZED HEALTH CARE EDUCATION/TRAINING PROGRAM

## 2022-01-12 PROCEDURE — 83735 ASSAY OF MAGNESIUM: CPT | Performed by: FAMILY MEDICINE

## 2022-01-12 PROCEDURE — 85025 COMPLETE CBC W/AUTO DIFF WBC: CPT | Performed by: FAMILY MEDICINE

## 2022-01-12 PROCEDURE — 82306 VITAMIN D 25 HYDROXY: CPT | Performed by: FAMILY MEDICINE

## 2022-01-12 PROCEDURE — 99213 OFFICE O/P EST LOW 20 MIN: CPT | Performed by: STUDENT IN AN ORGANIZED HEALTH CARE EDUCATION/TRAINING PROGRAM

## 2022-01-12 PROCEDURE — 80053 COMPREHEN METABOLIC PANEL: CPT | Performed by: FAMILY MEDICINE

## 2022-01-12 RX ORDER — LEVOTHYROXINE SODIUM 0.2 MG/1
200 TABLET ORAL DAILY
Qty: 30 TABLET | Refills: 3 | Status: SHIPPED | OUTPATIENT
Start: 2022-01-12 | End: 2022-06-22 | Stop reason: SDUPTHER

## 2022-01-12 RX ORDER — MAGNESIUM OXIDE 400 MG/1
1 TABLET ORAL DAILY
Qty: 30 TABLET | Refills: 3 | Status: SHIPPED | OUTPATIENT
Start: 2022-01-12 | End: 2022-10-14 | Stop reason: SDUPTHER

## 2022-01-12 RX ORDER — FAMCICLOVIR 500 MG/1
500 TABLET ORAL 2 TIMES DAILY PRN
Qty: 20 TABLET | Refills: 0 | Status: SHIPPED | OUTPATIENT
Start: 2022-01-12 | End: 2023-04-06 | Stop reason: SDUPTHER

## 2022-01-12 RX ORDER — CYCLOBENZAPRINE HCL 10 MG
10 TABLET ORAL 3 TIMES DAILY PRN
Qty: 90 TABLET | Refills: 0 | Status: SHIPPED | OUTPATIENT
Start: 2022-01-12 | End: 2022-04-07 | Stop reason: SDUPTHER

## 2022-01-12 RX ORDER — FOLIC ACID 5 MG
1 CAPSULE ORAL DAILY
Qty: 90 EACH | Refills: 3 | Status: SHIPPED | OUTPATIENT
Start: 2022-01-12

## 2022-01-12 RX ORDER — VALACYCLOVIR HYDROCHLORIDE 500 MG/1
500 TABLET, FILM COATED ORAL DAILY
Qty: 30 TABLET | Refills: 3 | Status: SHIPPED | OUTPATIENT
Start: 2022-01-12 | End: 2022-10-14 | Stop reason: SDUPTHER

## 2022-01-12 NOTE — PROGRESS NOTES
"  Family Medicine Residency  Artemio Caceres MD    Subjective:     Argenis Blair is a 61 y.o. female who presents for medication refills.  Patient reports that she has been having some issues in obtaining her prescriptions and has been out of them for about a month.  Previously she was prescribed Prolia as well but she reports she never got a call about getting this treatment done.  Reports compliance with her medications and denies any adverse effects.  Reports that she takes a \"U medicine\" for her thyroid that does not work as well as her Synthroid and would like to only take Synthroid.  Reports that she is more tired and irritable and has more skin dryness since she has not had her medicine.    The following portions of the patient's history were reviewed and updated as appropriate: allergies, current medications, past family history, past medical history, past social history, past surgical history and problem list.    Past Medical Hx:  Past Medical History:   Diagnosis Date   • Allergy to bee sting    • Anemia    • Asthma    • Colitis    • Depression    • Essential (primary) hypertension    • Fibrocystic breast    • Hashimoto's thyroiditis    • Hypertensive left ventricular hypertrophy    • Osteoporosis    • Osteoporosis    • PONV (postoperative nausea and vomiting)    • Primary hyperparathyroidism (HCC)     s/p parathyroidectomy      • Vitamin D deficiency        Past Surgical Hx:  Past Surgical History:   Procedure Laterality Date   • BLADDER SURGERY  2004   • CHOLECYSTECTOMY  2002   • COLONOSCOPY N/A 1/19/2018    Procedure: COLONOSCOPY;  Surgeon: Samson Smith MD;  Location: [facility] ENDOSCOPY;  Service:    • DILATATION AND CURETTAGE  1986   • ENDOSCOPY N/A 1/19/2018    Procedure: ESOPHAGOGASTRODUODENOSCOPY--before follow up;  Surgeon: Samson Smith MD;  Location: [facility] ENDOSCOPY;  Service:    • HYSTERECTOMY  2004   • KNEE ARTHROSCOPY Right 3/10/2021    Procedure: arthroscopy of right knee with " debridement of medial and lateral meniscus.;  Surgeon: Kofi Cleary MD;  Location: [facility];  Service: Orthopedics;  Laterality: Right;   • PARATHYROID GLAND SURGERY     • TUBAL ABDOMINAL LIGATION  1988   • UPPER GASTROINTESTINAL ENDOSCOPY  01/19/2018       Current Meds:    Current Outpatient Medications:   •  betamethasone dipropionate 0.05 % cream, Apply  topically to the appropriate area as directed 2 (Two) Times a Day., Disp: 45 g, Rfl: 0  •  COLLAGEN PO, Take 1 tablet by mouth Daily., Disp: , Rfl:   •  cyclobenzaprine (FLEXERIL) 10 MG tablet, Take 1 tablet by mouth 3 (Three) Times a Day As Needed for Muscle Spasms., Disp: 90 tablet, Rfl: 0  •  diclofenac (VOLTAREN) 50 MG EC tablet, Take 1 tablet by mouth 2 (Two) Times a Day As Needed (As needed for pain). for pain, Disp: 60 tablet, Rfl: 3  •  famciclovir (Famvir) 500 MG tablet, Take 1 tablet by mouth 2 (Two) Times a Day As Needed (symptom recurrence)., Disp: 20 tablet, Rfl: 0  •  Folic Acid 5 MG capsule, Take 1 capsule by mouth Daily., Disp: 90 each, Rfl: 3  •  levothyroxine (Synthroid) 200 MCG tablet, Take 1 tablet by mouth Daily., Disp: 30 tablet, Rfl: 3  •  losartan 50 MG tablet 50 mg, hydroCHLOROthiazide 12.5 MG 12.5 mg, Take 1 dose by mouth Daily., Disp: , Rfl:   •  magnesium oxide (MAG-OX) 400 MG tablet, Take 1 tablet by mouth Daily., Disp: 30 tablet, Rfl: 3  •  ondansetron (ZOFRAN) 4 MG tablet, Take 1 tablet by mouth Every 8 (Eight) Hours As Needed for Nausea or Vomiting., Disp: 10 tablet, Rfl: 0  •  polyethylene glycol (MIRALAX) packet, Take 17 g by mouth Daily., Disp: 30 each, Rfl: 0  •  valACYclovir (Valtrex) 500 MG tablet, Take 1 tablet by mouth Daily., Disp: 30 tablet, Rfl: 3  •  vitamin D3 (Vitamin D) 125 MCG (5000 UT) capsule capsule, Take 1 capsule by mouth Daily., Disp: 30 capsule, Rfl: 11  •  denosumab (PROLIA) 60 MG/ML solution prefilled syringe syringe, Inject 1 mL under the skin into the appropriate area as directed 1 (One) Time  "for 1 dose., Disp: 180 mL, Rfl: 1  •  fluticasone (Flonase) 50 MCG/ACT nasal spray, 2 sprays into the nostril(s) as directed by provider Daily., Disp: 9.9 mL, Rfl: 3    Allergies:  Allergies   Allergen Reactions   • Bee Venom Anaphylaxis   • Percocet [Oxycodone-Acetaminophen] Delirium   • Claritin [Loratadine]    • Keflex [Cephalexin] Nausea And Vomiting   • Sulfa Antibiotics        Family Hx:  Family History   Problem Relation Age of Onset   • Diabetes Mother    • Heart disease Mother    • Heart disease Father    • Heart disease Brother    • Cancer Other    • Breast cancer Other    • Hypertension Other    • Lung cancer Other    • Lung disease Other    • Breast cancer Maternal Aunt    • Breast cancer Paternal Aunt         Social History:  Social History     Socioeconomic History   • Marital status:    Tobacco Use   • Smoking status: Never Smoker   • Smokeless tobacco: Never Used   Vaping Use   • Vaping Use: Never used   Substance and Sexual Activity   • Alcohol use: No   • Drug use: No   • Sexual activity: Defer       Review of Systems  Review of Systems   Constitutional: Positive for fatigue. Negative for fever.   HENT: Positive for congestion and rhinorrhea.    Respiratory: Negative for shortness of breath.    Cardiovascular: Positive for leg swelling. Negative for chest pain.   Gastrointestinal: Positive for diarrhea. Negative for abdominal pain, nausea and vomiting.   Genitourinary: Negative for difficulty urinating and dysuria.   Neurological: Positive for headaches. Negative for dizziness and light-headedness.       Objective:     /88   Pulse 69   Ht 152.4 cm (60\")   Wt 105 kg (230 lb 6.4 oz)   SpO2 99%   BMI 45.00 kg/m²   Physical Exam  Vitals reviewed.   Constitutional:       General: She is not in acute distress.  HENT:      Head: Normocephalic and atraumatic.      Right Ear: Tympanic membrane and external ear normal.      Left Ear: Tympanic membrane and external ear normal.      Nose: " Congestion and rhinorrhea present.   Eyes:      Conjunctiva/sclera: Conjunctivae normal.   Cardiovascular:      Rate and Rhythm: Normal rate and regular rhythm.      Pulses: Normal pulses.      Heart sounds: Normal heart sounds.   Pulmonary:      Effort: Pulmonary effort is normal.      Breath sounds: Normal breath sounds.   Abdominal:      Palpations: Abdomen is soft.      Tenderness: There is no abdominal tenderness.   Musculoskeletal:      Cervical back: Normal range of motion and neck supple.      Right lower leg: No edema.      Left lower leg: No edema.   Skin:     General: Skin is warm.   Neurological:      General: No focal deficit present.      Mental Status: She is alert.   Psychiatric:         Attention and Perception: Attention normal.         Mood and Affect: Mood is anxious. Affect is tearful.         Speech: Speech normal.         Behavior: Behavior is cooperative.          Assessment/Plan:     Diagnoses and all orders for this visit:    1. Essential hypertension (Primary)  -     CBC w AUTO Differential  -     Comprehensive metabolic panel  -     Lipid panel  - HTN has been well controlled on previous medications so I will continue these at this time. Office MAs called pharmacy about issue with her Hyzaar and were able to resolve the problem so that patient could continue taking this medication. Will continue to monitor and make therapy adjustments accordingly.    2. Hypothyroidism due to Hashimoto's thyroiditis  -     levothyroxine (Synthroid) 200 MCG tablet; Take 1 tablet by mouth Daily.  Dispense: 30 tablet; Refill: 3  - Previously well controlled on Synthroid so we will restart this at her previous dose and potentially recheck TSH and T4 levels in 3 months at her next visit.  Will continue to monitor and make therapy adjustments accordingly    3. Vitamin D deficiency  -     Vitamin D 25 hydroxy  -Patient's last vitamin D level was low normal in 2 years ago so I have repeated this today.  She has a  history of vitamin D deficiency which may have contributed attributed to her osteoporosis.  Will follow-up with results and make dosage adjustments accordingly.  Potentially could decrease her dosage since she is on 5000 units/day.     4. Fever blister  -     famciclovir (Famvir) 500 MG tablet; Take 1 tablet by mouth 2 (Two) Times a Day As Needed (symptom recurrence).  Dispense: 20 tablet; Refill: 0  -     valACYclovir (Valtrex) 500 MG tablet; Take 1 tablet by mouth Daily.  Dispense: 30 tablet; Refill: 3  -Well-controlled with current medications.  Informed patient that increasing her Valtrex on days she has fever blisters could eliminate the need for Famvir but patient declined and wanted to continue her current medical regimen.  I have refilled these at this time will continue to monitor make therapy adjustments accordingly.    5. Hypomagnesemia  -     Magnesium  -     magnesium oxide (MAG-OX) 400 MG tablet; Take 1 tablet by mouth Daily.  Dispense: 30 tablet; Refill: 3   -We will recheck magnesium levels today and call patient with results.  Will discuss discontinuing magnesium oxide when I discuss results with patient as patient's magnesium levels were previously normal per chart review.     6. Osteoporosis without current pathological fracture, unspecified osteoporosis type  -     vitamin D3 (Vitamin D) 125 MCG (5000 UT) capsule capsule; Take 1 capsule by mouth Daily.  Dispense: 30 capsule; Refill: 11  -Potentially exacerbated by her previous vitamin D deficiency.  She is overdue for a DEXA scan so when I call her with her vitamin D level results I will discuss scheduling her for a DEXA scan.  Paperwork filled out for Prolia prescription and will be faxed to the [facility] and they will call patient to schedule appointment for this injection.  I will continue to monitor make therapy adjustments accordingly.    7. Fibromyalgia  -     cyclobenzaprine (FLEXERIL) 10 MG tablet; Take 1 tablet by mouth 3 (Three) Times  a Day As Needed for Muscle Spasms.  Dispense: 90 tablet; Refill: 0  -     diclofenac (VOLTAREN) 50 MG EC tablet; Take 1 tablet by mouth 2 (Two) Times a Day As Needed (As needed for pain). for pain  Dispense: 60 tablet; Refill: 3  -Patient reports pain are improved with these medications.  I discussed with her trying other options such as Cymbalta and Elavil for chronic pain but she reported that she has tried these previously and could not tolerate side effects.  I have refilled these medications for now and we will recheck her kidney function and labs and evaluate need for medication adjustment or discontinuation.  Could potentially consider gabapentin as alternative option for chronic pain.    8. Oral ulcer  -     valACYclovir (Valtrex) 500 MG tablet; Take 1 tablet by mouth Daily.  Dispense: 30 tablet; Refill: 3  -Well-controlled with current medication.  Discussed with patient that this could treat her oral fever blisters as well and that she could increase the dosage on days that she has the fever blisters but she wanted to continue with her current medical regimen.  So I have refilled this medication today and I will continue to monitor and make therapy adjustments accordingly.    9. Viral URI  -     fluticasone (Flonase) 50 MCG/ACT nasal spray; 2 sprays into the nostril(s) as directed by provider Daily.  Dispense: 9.9 mL; Refill: 3  -Patient having symptoms of allergic rhinitis versus viral URI.  Prescribed Flonase in hopes of symptom control or improvement.  I will continue to monitor and make therapy adjustments currently.    Other orders  -     Folic Acid 5 MG capsule; Take 1 capsule by mouth Daily.  Dispense: 90 each; Refill: 3        · Rx changes: refer to a/p  · Compliance at present is estimated to be good.   · Efforts to improve compliance (if necessary) will be directed at none.    Depression screening: Patient screened positive for depression based on a PHQ-9 score of 4 on 8/14/2018. Follow-up  recommendations include: PCP managing depression.     Follow-up:     Return in about 3 months (around 4/12/2022) for Recheck.    Preventative:  Health Maintenance   Topic Date Due   • COVID-19 Vaccine (1) Never done   • ZOSTER VACCINE (1 of 2) Never done   • ANNUAL PHYSICAL  08/15/2019   • MAMMOGRAM  08/14/2021   • DXA SCAN  08/14/2021   • COLORECTAL CANCER SCREENING  01/19/2023   • TDAP/TD VACCINES (2 - Td or Tdap) 11/09/2027   • HEPATITIS C SCREENING  Completed   • Pneumococcal Vaccine 0-64  Aged Out   • INFLUENZA VACCINE  Discontinued   • PAP SMEAR  Discontinued     Female Preventative: Mammogram is due  Recommended: Zoster  Vaccine Counseling: will  at next visit    Weight  -Class: Obese Class III extreme obesity: > or equal to 40kg/m2  -Patient's Body mass index is 45 kg/m². indicating that she is morbidly obese (BMI > 40 or > 35 with obesity - related health condition). Obesity-related health conditions include the following: hypertension and GERD. Obesity is unchanged. BMI is is above average; BMI management plan is completed. We discussed portion control, increasing exercise and management of depression/anxiety/stress to control compensatory eating..   eat more fruits and vegetables, increase water intake, increase physical activity and reduce portion size    Alcohol use:  reports no history of alcohol use.  Nicotine status  reports that she has never smoked. She has never used smokeless tobacco.    Goals     • Less pain     • Less sadness/anxiety      Barriers: patient does not wish for treatment of her grief            RISK SCORE: 3      This document has been electronically signed by Artemio Caceres MD on January 13, 2022 08:57 CST

## 2022-01-13 PROBLEM — K59.00 CONSTIPATION: Status: RESOLVED | Noted: 2017-11-30 | Resolved: 2022-01-13

## 2022-01-13 PROBLEM — K62.5 HEMORRHAGE OF ANUS AND RECTUM: Status: RESOLVED | Noted: 2017-11-30 | Resolved: 2022-01-13

## 2022-01-13 PROBLEM — Z63.9 FAMILY PROBLEMS: Status: RESOLVED | Noted: 2018-01-05 | Resolved: 2022-01-13

## 2022-01-13 PROBLEM — M25.532 LEFT WRIST PAIN: Status: RESOLVED | Noted: 2017-09-25 | Resolved: 2022-01-13

## 2022-01-13 PROBLEM — R19.4 CHANGE IN BOWEL HABITS: Status: RESOLVED | Noted: 2017-11-30 | Resolved: 2022-01-13

## 2022-01-13 PROBLEM — M23.91 INTERNAL DERANGEMENT OF RIGHT KNEE: Status: RESOLVED | Noted: 2021-02-12 | Resolved: 2022-01-13

## 2022-01-13 PROBLEM — M25.561 ACUTE PAIN OF RIGHT KNEE: Status: RESOLVED | Noted: 2021-02-01 | Resolved: 2022-01-13

## 2022-01-13 PROBLEM — K29.70 GASTRITIS: Status: RESOLVED | Noted: 2017-10-25 | Resolved: 2022-01-13

## 2022-01-13 PROBLEM — R11.0 NAUSEA: Status: RESOLVED | Noted: 2017-11-30 | Resolved: 2022-01-13

## 2022-01-13 PROBLEM — H92.03 OTALGIA OF BOTH EARS: Status: RESOLVED | Noted: 2018-04-02 | Resolved: 2022-01-13

## 2022-01-13 PROBLEM — K92.1 HEMATOCHEZIA: Status: RESOLVED | Noted: 2017-10-25 | Resolved: 2022-01-13

## 2022-01-13 PROBLEM — E03.9 ACQUIRED HYPOTHYROIDISM: Status: RESOLVED | Noted: 2017-10-25 | Resolved: 2022-01-13

## 2022-01-13 PROBLEM — N18.2 CKD (CHRONIC KIDNEY DISEASE) STAGE 2, GFR 60-89 ML/MIN: Status: ACTIVE | Noted: 2022-01-13

## 2022-01-13 RX ORDER — FLUTICASONE PROPIONATE 50 MCG
2 SPRAY, SUSPENSION (ML) NASAL DAILY
Qty: 9.9 ML | Refills: 3 | Status: SHIPPED | OUTPATIENT
Start: 2022-01-13

## 2022-01-14 DIAGNOSIS — M81.0 OSTEOPOROSIS, UNSPECIFIED OSTEOPOROSIS TYPE, UNSPECIFIED PATHOLOGICAL FRACTURE PRESENCE: Primary | ICD-10-CM

## 2022-01-18 NOTE — PROGRESS NOTES
I have seen the patient.  I have reviewed the notes, assessments, and/or procedures performed by Artemio Caceres MD during office visit I concur with her/his documentation and assessment and plan for Argenis Ochoa Johnnie.            This document has been electronically signed by Sean Morrison MD on January 18, 2022 09:33 CST

## 2022-01-24 DIAGNOSIS — I10 ESSENTIAL HYPERTENSION: ICD-10-CM

## 2022-01-24 RX ORDER — LOSARTAN POTASSIUM AND HYDROCHLOROTHIAZIDE 12.5; 5 MG/1; MG/1
1 TABLET ORAL DAILY
Qty: 90 TABLET | Refills: 3 | Status: SHIPPED | OUTPATIENT
Start: 2022-01-24 | End: 2022-10-14 | Stop reason: SDUPTHER

## 2022-01-31 ENCOUNTER — APPOINTMENT (OUTPATIENT)
Dept: LAB | Facility: HOSPITAL | Age: 62
End: 2022-01-31

## 2022-02-07 ENCOUNTER — APPOINTMENT (OUTPATIENT)
Dept: ONCOLOGY | Facility: HOSPITAL | Age: 62
End: 2022-02-07

## 2022-04-07 DIAGNOSIS — M79.7 FIBROMYALGIA: ICD-10-CM

## 2022-04-07 RX ORDER — CYCLOBENZAPRINE HCL 10 MG
10 TABLET ORAL 3 TIMES DAILY PRN
Qty: 90 TABLET | Refills: 0 | Status: SHIPPED | OUTPATIENT
Start: 2022-04-07 | End: 2022-10-14 | Stop reason: SDUPTHER

## 2022-05-03 DIAGNOSIS — M79.7 FIBROMYALGIA: ICD-10-CM

## 2022-06-22 DIAGNOSIS — E06.3 HYPOTHYROIDISM DUE TO HASHIMOTO'S THYROIDITIS: ICD-10-CM

## 2022-06-22 DIAGNOSIS — E03.8 HYPOTHYROIDISM DUE TO HASHIMOTO'S THYROIDITIS: ICD-10-CM

## 2022-06-22 RX ORDER — LEVOTHYROXINE SODIUM 0.2 MG/1
200 TABLET ORAL DAILY
Qty: 30 TABLET | Refills: 3 | Status: SHIPPED | OUTPATIENT
Start: 2022-06-22 | End: 2022-10-14 | Stop reason: SDUPTHER

## 2022-09-29 ENCOUNTER — OFFICE VISIT (OUTPATIENT)
Dept: FAMILY MEDICINE CLINIC | Facility: CLINIC | Age: 62
End: 2022-09-29

## 2022-09-29 VITALS
DIASTOLIC BLOOD PRESSURE: 76 MMHG | HEART RATE: 108 BPM | TEMPERATURE: 96.6 F | OXYGEN SATURATION: 97 % | WEIGHT: 215.9 LBS | BODY MASS INDEX: 42.39 KG/M2 | HEIGHT: 60 IN | SYSTOLIC BLOOD PRESSURE: 132 MMHG

## 2022-09-29 DIAGNOSIS — N60.19 FIBROCYSTIC BREAST DISEASE (FCBD), UNSPECIFIED LATERALITY: ICD-10-CM

## 2022-09-29 DIAGNOSIS — Z09 HOSPITAL DISCHARGE FOLLOW-UP: ICD-10-CM

## 2022-09-29 DIAGNOSIS — K57.90 DIVERTICULOSIS: ICD-10-CM

## 2022-09-29 DIAGNOSIS — Z12.31 ENCOUNTER FOR SCREENING MAMMOGRAM FOR MALIGNANT NEOPLASM OF BREAST: ICD-10-CM

## 2022-09-29 DIAGNOSIS — M81.0 OSTEOPOROSIS WITHOUT CURRENT PATHOLOGICAL FRACTURE, UNSPECIFIED OSTEOPOROSIS TYPE: Primary | ICD-10-CM

## 2022-09-29 PROCEDURE — 81002 URINALYSIS NONAUTO W/O SCOPE: CPT | Performed by: STUDENT IN AN ORGANIZED HEALTH CARE EDUCATION/TRAINING PROGRAM

## 2022-09-29 PROCEDURE — 99213 OFFICE O/P EST LOW 20 MIN: CPT | Performed by: STUDENT IN AN ORGANIZED HEALTH CARE EDUCATION/TRAINING PROGRAM

## 2022-09-29 NOTE — PROGRESS NOTES
"  Family Medicine Residency  Artemio Caceres MD    Subjective:     Argenis Blair is a 62 y.o. female who presents for hospital follow up due to diverticulitis, UTI, and Ostoeporosis.     Having abdominal pain and burning with urination in the bladder. Was drinking cranberry juice. Given macrobid at urgent care. Started having vomiting multiple times every few minutes. Chills and no energy. Taken to hospital and was getting antibiotics and kept having fever. Had hypotension in hospital. Diagnosed with sepsis due to diverticulitis. Went into hospital on 9/18/22. Was in the hospital for about 4 days.     Has \"knot\" in arm from IV but size is going down and improving.     Diverticulitis - received educational information about diverticulosis and diverticulitis from hospital. Received metronidazole and ciprofloxacin during hospitalization. Abdominal pain has improved since hospitalization.     UTI - received ceftriaxone during hospitalization. Burning with urination has improved. Still having some pain with urination.     Finished all antibiotics yesterday.    Osteoporosis - paperwork filled out for Prolia prescription previously but according to chart review, appointment was canceled due to financial issues. Patient reports she has new insurance now and would like to try to obtain this medicine again.      Past Medical Hx:  Past Medical History:   Diagnosis Date   • Allergy to bee sting    • Anemia    • Asthma    • Colitis    • Depression    • Essential (primary) hypertension    • Fibrocystic breast    • Hashimoto's thyroiditis    • Hypertensive left ventricular hypertrophy    • Osteoporosis    • Osteoporosis    • PONV (postoperative nausea and vomiting)    • Primary hyperparathyroidism (HCC)     s/p parathyroidectomy      • Vitamin D deficiency        Past Surgical Hx:  Past Surgical History:   Procedure Laterality Date   • BLADDER SURGERY  2004   • CHOLECYSTECTOMY  2002   • COLONOSCOPY N/A 1/19/2018    Procedure: " COLONOSCOPY;  Surgeon: Samson Smith MD;  Location: [facility] ENDOSCOPY;  Service:    • DILATATION AND CURETTAGE  1986   • ENDOSCOPY N/A 1/19/2018    Procedure: ESOPHAGOGASTRODUODENOSCOPY--before follow up;  Surgeon: Samson Smith MD;  Location: [facility] ENDOSCOPY;  Service:    • HYSTERECTOMY  2004   • KNEE ARTHROSCOPY Right 3/10/2021    Procedure: arthroscopy of right knee with debridement of medial and lateral meniscus.;  Surgeon: Kofi Celary MD;  Location: [facility] OR;  Service: Orthopedics;  Laterality: Right;   • PARATHYROID GLAND SURGERY     • TUBAL ABDOMINAL LIGATION  1988   • UPPER GASTROINTESTINAL ENDOSCOPY  01/19/2018       Current Meds:    Current Outpatient Medications:   •  denosumab (PROLIA) 60 MG/ML solution prefilled syringe syringe, Inject 60 mg (1 syringe) into the appropriate area every 6 months., Disp: 1 mL, Rfl: 1  •  betamethasone dipropionate 0.05 % cream, Apply  topically to the appropriate area as directed 2 (Two) Times a Day., Disp: 45 g, Rfl: 0  •  COLLAGEN PO, Take 1 tablet by mouth Daily., Disp: , Rfl:   •  cyclobenzaprine (FLEXERIL) 10 MG tablet, Take 1 tablet by mouth 3 (Three) Times a Day As Needed for Muscle Spasms., Disp: 90 tablet, Rfl: 0  •  diclofenac (VOLTAREN) 50 MG EC tablet, Take 1 tablet by mouth 2 (Two) Times a Day As Needed (As needed for pain). for pain, Disp: 60 tablet, Rfl: 3  •  famciclovir (Famvir) 500 MG tablet, Take 1 tablet by mouth 2 (Two) Times a Day As Needed (symptom recurrence)., Disp: 20 tablet, Rfl: 0  •  fluticasone (Flonase) 50 MCG/ACT nasal spray, 2 sprays into the nostril(s) as directed by provider Daily., Disp: 9.9 mL, Rfl: 3  •  Folic Acid 5 MG capsule, Take 1 capsule by mouth Daily., Disp: 90 each, Rfl: 3  •  levothyroxine (Synthroid) 200 MCG tablet, Take 1 tablet by mouth Daily., Disp: 30 tablet, Rfl: 3  •  losartan-hydrochlorothiazide (Hyzaar) 50-12.5 MG per tablet, Take 1 tablet by mouth Daily., Disp: 90 tablet, Rfl: 3  •  magnesium oxide  "(MAG-OX) 400 MG tablet, Take 1 tablet by mouth Daily., Disp: 30 tablet, Rfl: 3  •  ondansetron (ZOFRAN) 4 MG tablet, Take 1 tablet by mouth Every 8 (Eight) Hours As Needed for Nausea or Vomiting., Disp: 10 tablet, Rfl: 0  •  polyethylene glycol (MIRALAX) packet, Take 17 g by mouth Daily., Disp: 30 each, Rfl: 0  •  valACYclovir (Valtrex) 500 MG tablet, Take 1 tablet by mouth Daily., Disp: 30 tablet, Rfl: 3  •  vitamin D3 (Vitamin D) 125 MCG (5000 UT) capsule capsule, Take 1 capsule by mouth Daily., Disp: 30 capsule, Rfl: 11    Allergies:  Allergies   Allergen Reactions   • Bee Venom Anaphylaxis   • Percocet [Oxycodone-Acetaminophen] Delirium   • Claritin [Loratadine]    • Keflex [Cephalexin] Nausea And Vomiting   • Sulfa Antibiotics        Family Hx:  Family History   Problem Relation Age of Onset   • Diabetes Mother    • Heart disease Mother    • Heart disease Father    • Heart disease Brother    • Cancer Other    • Breast cancer Other    • Hypertension Other    • Lung cancer Other    • Lung disease Other    • Breast cancer Maternal Aunt    • Breast cancer Paternal Aunt         Social History:  Social History     Socioeconomic History   • Marital status:    Tobacco Use   • Smoking status: Never Smoker   • Smokeless tobacco: Never Used   Vaping Use   • Vaping Use: Never used   Substance and Sexual Activity   • Alcohol use: No   • Drug use: No   • Sexual activity: Defer       Review of Systems  Review of Systems   Constitutional: Negative for fever.   Respiratory: Negative for shortness of breath.    Cardiovascular: Negative for chest pain and leg swelling.   Gastrointestinal: Negative for abdominal pain, constipation, diarrhea, nausea and vomiting.   Genitourinary: Positive for pelvic pain. Negative for dysuria.       Objective:     /76   Pulse 108   Temp 96.6 °F (35.9 °C)   Ht 152.4 cm (60\")   Wt 97.9 kg (215 lb 14.4 oz)   SpO2 97%   BMI 42.17 kg/m²   Physical Exam  HENT:      Head: Normocephalic " and atraumatic.   Cardiovascular:      Rate and Rhythm: Normal rate and regular rhythm.      Pulses: Normal pulses.   Pulmonary:      Effort: Pulmonary effort is normal. No respiratory distress.   Abdominal:      Palpations: Abdomen is soft.      Tenderness: There is no abdominal tenderness.   Musculoskeletal:      Right lower leg: No edema.      Left lower leg: No edema.   Skin:     General: Skin is warm.   Neurological:      Mental Status: She is alert. Mental status is at baseline.   Psychiatric:         Mood and Affect: Affect is tearful.         Speech: Speech normal.         Behavior: Behavior is cooperative.          Assessment/Plan:     Diagnoses and all orders for this visit:    1. Osteoporosis without current pathological fracture, unspecified osteoporosis type (Primary)  -     denosumab (PROLIA) 60 MG/ML solution prefilled syringe syringe; Inject 60 mg (1 syringe) into the appropriate area every 6 months.  Dispense: 1 mL; Refill: 1  -     DEXA Bone Density Axial; Future    - Will attempt to prescribe prolia injections again for patient's osteoporosis since she now has insurance coverage. Will also evaluate with Dexa scan. PA sent during encounter by clinic pharmacist resident. If approved will refer to [facility] for prolia administration and lab follow up.     2. Hospital discharge follow-up  - urine dipstick    - Stable from hospital discharge. Completed antibiotic courses. Urine dipstick in office negative for UTI.     3. Diverticulosis    - Stable at this time. Patient has educational material from hospital and I encouraged her to follow the recommendations from the handouts to prevent future flare ups.     4. Fibrocystic breast disease (FCBD), unspecified laterality  -     Mammo screening digital tomosynthesis bilateral w CAD; Future    5. Encounter for screening mammogram for malignant neoplasm of breast  -     Mammo screening digital tomosynthesis bilateral w CAD; Future      Advised patient that  if hematoma in arm enlarges or unilateral arm swelling occurs she is to RTC for evaluation. Otherwise counseled that hematoma should resolve gradually.      Follow-up:     Return in about 3 months (around 12/29/2022) for Annual, Recheck.    Preventative:  Health Maintenance   Topic Date Due   • COVID-19 Vaccine (1) Never done   • Pneumococcal Vaccine 0-64 (1 - PCV) Never done   • ZOSTER VACCINE (1 of 2) Never done   • ANNUAL PHYSICAL  08/15/2019   • MAMMOGRAM  08/14/2021   • DXA SCAN  08/14/2021   • COLORECTAL CANCER SCREENING  01/19/2023   • TDAP/TD VACCINES (2 - Td or Tdap) 11/09/2027   • HEPATITIS C SCREENING  Completed   • INFLUENZA VACCINE  Discontinued   • PAP SMEAR  Discontinued       Alcohol use:  reports no history of alcohol use.  Nicotine status  reports that she has never smoked. She has never used smokeless tobacco.     Goals     • Less pain     • Less sadness/anxiety      Barriers: patient does not wish for treatment of her grief          RISK SCORE: 3      This document has been electronically signed by Artemio Caceres MD on September 29, 2022 16:41 CDT

## 2022-09-29 NOTE — PROGRESS NOTES
I have reviewed the patient.  I have reviewed the notes, assessments, and/or procedures performed by Dr Artemio Caceres, I concur with his  documentation and assessment and plan for Argenis Blair.          This document has been electronically signed by Jamil Yates MD on September 29, 2022 16:54 CDT

## 2022-10-13 ENCOUNTER — TELEPHONE (OUTPATIENT)
Dept: FAMILY MEDICINE CLINIC | Facility: CLINIC | Age: 62
End: 2022-10-13

## 2022-10-13 NOTE — TELEPHONE ENCOUNTER
Incoming Refill Request      Medication requested (name and dose): levothyroxine (Synthroid) 200 MCG tablet valACYclovir (Valtrex) 500 MG tablet cyclobenzaprine (FLEXERIL) 10 MG tablet losartan-hydrochlorothiazide (Hyzaar) 50-12.5 MG per tablet magnesium oxide (MAG-OX) 400 MG tablet Folic Acid 5 MG capsule    Pharmacy where request should be sent: [facility]    Additional details provided by patient:     Best call back number: [phone]    Does the patient have less than a 3 day supply:  [x] Yes  [] No    Mohini Caldera  10/13/22, 15:02 CDT           Curettage Text: The wound bed was treated with curettage after the biopsy was performed.

## 2022-10-14 DIAGNOSIS — M79.7 FIBROMYALGIA: ICD-10-CM

## 2022-10-14 DIAGNOSIS — E06.3 HYPOTHYROIDISM DUE TO HASHIMOTO'S THYROIDITIS: ICD-10-CM

## 2022-10-14 DIAGNOSIS — E83.42 HYPOMAGNESEMIA: ICD-10-CM

## 2022-10-14 DIAGNOSIS — K12.1 ORAL ULCER: ICD-10-CM

## 2022-10-14 DIAGNOSIS — B00.1 FEVER BLISTER: ICD-10-CM

## 2022-10-14 DIAGNOSIS — E03.8 HYPOTHYROIDISM DUE TO HASHIMOTO'S THYROIDITIS: ICD-10-CM

## 2022-10-14 RX ORDER — LOSARTAN POTASSIUM AND HYDROCHLOROTHIAZIDE 12.5; 5 MG/1; MG/1
1 TABLET ORAL DAILY
Qty: 90 TABLET | Refills: 3 | Status: SHIPPED | OUTPATIENT
Start: 2022-10-14 | End: 2023-04-06 | Stop reason: SDUPTHER

## 2022-10-14 RX ORDER — VALACYCLOVIR HYDROCHLORIDE 500 MG/1
500 TABLET, FILM COATED ORAL DAILY
Qty: 30 TABLET | Refills: 3 | Status: SHIPPED | OUTPATIENT
Start: 2022-10-14 | End: 2023-04-06 | Stop reason: SDUPTHER

## 2022-10-14 RX ORDER — MAGNESIUM OXIDE 400 MG/1
1 TABLET ORAL DAILY
Qty: 30 TABLET | Refills: 3 | Status: SHIPPED | OUTPATIENT
Start: 2022-10-14 | End: 2023-04-06 | Stop reason: SDUPTHER

## 2022-10-14 RX ORDER — LEVOTHYROXINE SODIUM 200 MCG
200 TABLET ORAL DAILY
Qty: 30 TABLET | Refills: 3 | Status: SHIPPED | OUTPATIENT
Start: 2022-10-14 | End: 2023-04-05 | Stop reason: SDUPTHER

## 2022-10-14 RX ORDER — CYCLOBENZAPRINE HCL 10 MG
10 TABLET ORAL 3 TIMES DAILY PRN
Qty: 90 TABLET | Refills: 0 | Status: SHIPPED | OUTPATIENT
Start: 2022-10-14 | End: 2023-04-06 | Stop reason: SDUPTHER

## 2022-10-21 ENCOUNTER — TELEPHONE (OUTPATIENT)
Dept: FAMILY MEDICINE CLINIC | Facility: CLINIC | Age: 62
End: 2022-10-21

## 2022-10-27 DIAGNOSIS — M81.0 OSTEOPOROSIS WITHOUT CURRENT PATHOLOGICAL FRACTURE, UNSPECIFIED OSTEOPOROSIS TYPE: ICD-10-CM

## 2022-10-27 DIAGNOSIS — Z51.81 ENCOUNTER FOR MONITORING DENOSUMAB THERAPY: Primary | ICD-10-CM

## 2022-10-27 DIAGNOSIS — Z59.89 INSURANCE COVERAGE PROBLEMS: ICD-10-CM

## 2022-10-27 DIAGNOSIS — Z79.899 ENCOUNTER FOR MONITORING DENOSUMAB THERAPY: Primary | ICD-10-CM

## 2022-10-27 SDOH — ECONOMIC STABILITY - INCOME SECURITY: OTHER PROBLEMS RELATED TO HOUSING AND ECONOMIC CIRCUMSTANCES: Z59.89

## 2022-10-28 ENCOUNTER — LAB (OUTPATIENT)
Dept: LAB | Facility: HOSPITAL | Age: 62
End: 2022-10-28

## 2022-10-28 DIAGNOSIS — M81.0 OSTEOPOROSIS WITHOUT CURRENT PATHOLOGICAL FRACTURE, UNSPECIFIED OSTEOPOROSIS TYPE: ICD-10-CM

## 2022-10-28 DIAGNOSIS — Z51.81 ENCOUNTER FOR MONITORING DENOSUMAB THERAPY: ICD-10-CM

## 2022-10-28 DIAGNOSIS — Z79.899 ENCOUNTER FOR MONITORING DENOSUMAB THERAPY: ICD-10-CM

## 2022-10-28 DIAGNOSIS — M81.0 OSTEOPOROSIS, UNSPECIFIED OSTEOPOROSIS TYPE, UNSPECIFIED PATHOLOGICAL FRACTURE PRESENCE: ICD-10-CM

## 2022-10-28 LAB
ALBUMIN SERPL-MCNC: 4.3 G/DL (ref 3.5–5.2)
ALBUMIN/GLOB SERPL: 1.7 G/DL
ALP SERPL-CCNC: 56 U/L (ref 39–117)
ALT SERPL W P-5'-P-CCNC: 14 U/L (ref 1–33)
ANION GAP SERPL CALCULATED.3IONS-SCNC: 11 MMOL/L (ref 5–15)
AST SERPL-CCNC: 15 U/L (ref 1–32)
BILIRUB SERPL-MCNC: 0.8 MG/DL (ref 0–1.2)
BUN SERPL-MCNC: 12 MG/DL (ref 8–23)
BUN/CREAT SERPL: 14.3 (ref 7–25)
CALCIUM SPEC-SCNC: 9.4 MG/DL (ref 8.6–10.5)
CHLORIDE SERPL-SCNC: 102 MMOL/L (ref 98–107)
CO2 SERPL-SCNC: 27 MMOL/L (ref 22–29)
CREAT SERPL-MCNC: 0.84 MG/DL (ref 0.57–1)
EGFRCR SERPLBLD CKD-EPI 2021: 78.7 ML/MIN/1.73
GLOBULIN UR ELPH-MCNC: 2.5 GM/DL
GLUCOSE SERPL-MCNC: 121 MG/DL (ref 65–99)
MAGNESIUM SERPL-MCNC: 1.8 MG/DL (ref 1.6–2.4)
PHOSPHATE SERPL-MCNC: 4.4 MG/DL (ref 2.5–4.5)
POTASSIUM SERPL-SCNC: 3.9 MMOL/L (ref 3.5–5.2)
PROT SERPL-MCNC: 6.8 G/DL (ref 6–8.5)
SODIUM SERPL-SCNC: 140 MMOL/L (ref 136–145)

## 2022-10-28 PROCEDURE — 86480 TB TEST CELL IMMUN MEASURE: CPT

## 2022-10-28 PROCEDURE — 80053 COMPREHEN METABOLIC PANEL: CPT

## 2022-10-28 PROCEDURE — 83735 ASSAY OF MAGNESIUM: CPT

## 2022-10-28 PROCEDURE — 36415 COLL VENOUS BLD VENIPUNCTURE: CPT

## 2022-10-28 PROCEDURE — 84100 ASSAY OF PHOSPHORUS: CPT

## 2022-11-01 LAB
GAMMA INTERFERON BACKGROUND BLD IA-ACNC: 0.02 IU/ML
M TB IFN-G BLD-IMP: NEGATIVE
M TB IFN-G CD4+ BCKGRND COR BLD-ACNC: 0.03 IU/ML
M TB IFN-G CD4+CD8+ BCKGRND COR BLD-ACNC: 0.03 IU/ML
MITOGEN IGNF BLD-ACNC: >10 IU/ML
QUANTIFERON INCUBATION: NORMAL
SERVICE CMNT-IMP: NORMAL

## 2022-11-03 ENCOUNTER — DOCUMENTATION (OUTPATIENT)
Dept: FAMILY MEDICINE CLINIC | Facility: CLINIC | Age: 62
End: 2022-11-03

## 2022-11-03 NOTE — PROGRESS NOTES
"Referral rec'd re: pt insurance coverage lapsing . Called [facility] and spoke with Melisa.  Pt's coverage was short term 0-03-34-10-31-22; no other information available at this time.   LCSW attempted to reach patient but recording states \"call cannot be completed at this time.\"    Mailed patient a letter with information for the insurance marketplace to obtain coverage.                 This document has been electronically signed by Tonja Huitron LCSW on November 3, 2022 15:37 CDT    "

## 2022-11-12 DIAGNOSIS — M79.7 FIBROMYALGIA: ICD-10-CM

## 2023-02-23 DIAGNOSIS — M79.7 FIBROMYALGIA: ICD-10-CM

## 2023-04-04 ENCOUNTER — TELEPHONE (OUTPATIENT)
Dept: FAMILY MEDICINE CLINIC | Facility: CLINIC | Age: 63
End: 2023-04-04
Payer: COMMERCIAL

## 2023-04-04 NOTE — TELEPHONE ENCOUNTER
Incoming Refill Request      Medication requested (name and dose):   Synthroid 200 MCG tablet  vitamin D3 (Vitamin D) 125 MCG (5000 UT) capsule capsule  Pharmacy where request should be sent:   [facility] [place]  Additional details provided by patient:     Best call back number: [phone]    Does the patient have less than a 3 day supply:  [x] Yes  [] No    Ashely Hebert Rep  04/04/23, 10:33 CDT

## 2023-04-05 DIAGNOSIS — E03.8 HYPOTHYROIDISM DUE TO HASHIMOTO'S THYROIDITIS: ICD-10-CM

## 2023-04-05 DIAGNOSIS — E06.3 HYPOTHYROIDISM DUE TO HASHIMOTO'S THYROIDITIS: ICD-10-CM

## 2023-04-05 DIAGNOSIS — M81.0 OSTEOPOROSIS WITHOUT CURRENT PATHOLOGICAL FRACTURE, UNSPECIFIED OSTEOPOROSIS TYPE: ICD-10-CM

## 2023-04-05 RX ORDER — LEVOTHYROXINE SODIUM 200 MCG
200 TABLET ORAL DAILY
Qty: 30 TABLET | Refills: 3 | Status: SHIPPED | OUTPATIENT
Start: 2023-04-05 | End: 2023-04-06 | Stop reason: SDUPTHER

## 2023-04-06 ENCOUNTER — OFFICE VISIT (OUTPATIENT)
Dept: FAMILY MEDICINE CLINIC | Facility: CLINIC | Age: 63
End: 2023-04-06
Payer: COMMERCIAL

## 2023-04-06 VITALS
TEMPERATURE: 97.2 F | HEART RATE: 82 BPM | OXYGEN SATURATION: 98 % | WEIGHT: 227.6 LBS | HEIGHT: 60 IN | BODY MASS INDEX: 44.68 KG/M2 | DIASTOLIC BLOOD PRESSURE: 72 MMHG | SYSTOLIC BLOOD PRESSURE: 126 MMHG

## 2023-04-06 DIAGNOSIS — E83.42 HYPOMAGNESEMIA: ICD-10-CM

## 2023-04-06 DIAGNOSIS — I10 ESSENTIAL HYPERTENSION: Primary | ICD-10-CM

## 2023-04-06 DIAGNOSIS — M79.7 FIBROMYALGIA: ICD-10-CM

## 2023-04-06 DIAGNOSIS — B00.1 FEVER BLISTER: ICD-10-CM

## 2023-04-06 DIAGNOSIS — E03.8 HYPOTHYROIDISM DUE TO HASHIMOTO'S THYROIDITIS: ICD-10-CM

## 2023-04-06 DIAGNOSIS — M81.0 OSTEOPOROSIS WITHOUT CURRENT PATHOLOGICAL FRACTURE, UNSPECIFIED OSTEOPOROSIS TYPE: ICD-10-CM

## 2023-04-06 DIAGNOSIS — K12.1 ORAL ULCER: ICD-10-CM

## 2023-04-06 DIAGNOSIS — E06.3 HYPOTHYROIDISM DUE TO HASHIMOTO'S THYROIDITIS: ICD-10-CM

## 2023-04-06 RX ORDER — LOSARTAN POTASSIUM AND HYDROCHLOROTHIAZIDE 12.5; 5 MG/1; MG/1
1 TABLET ORAL DAILY
Qty: 90 TABLET | Refills: 3 | Status: SHIPPED | OUTPATIENT
Start: 2023-04-06

## 2023-04-06 RX ORDER — FAMCICLOVIR 500 MG/1
500 TABLET ORAL 2 TIMES DAILY PRN
Qty: 20 TABLET | Refills: 0 | Status: SHIPPED | OUTPATIENT
Start: 2023-04-06

## 2023-04-06 RX ORDER — MAGNESIUM OXIDE 400 MG/1
1 TABLET ORAL DAILY
Qty: 30 TABLET | Refills: 3 | Status: SHIPPED | OUTPATIENT
Start: 2023-04-06

## 2023-04-06 RX ORDER — CYCLOBENZAPRINE HCL 10 MG
10 TABLET ORAL 3 TIMES DAILY PRN
Qty: 90 TABLET | Refills: 0 | Status: SHIPPED | OUTPATIENT
Start: 2023-04-06

## 2023-04-06 RX ORDER — LEVOTHYROXINE SODIUM 200 MCG
200 TABLET ORAL DAILY
Qty: 30 TABLET | Refills: 3 | Status: SHIPPED | OUTPATIENT
Start: 2023-04-06

## 2023-04-06 RX ORDER — VALACYCLOVIR HYDROCHLORIDE 500 MG/1
500 TABLET, FILM COATED ORAL DAILY
Qty: 30 TABLET | Refills: 3 | Status: SHIPPED | OUTPATIENT
Start: 2023-04-06

## 2023-04-06 NOTE — PROGRESS NOTES
I have reviewed the notes, assessments, and/or procedures performed by Artemio Caceres MD during office visit. I concur with her/his documentation and assessment and plan for Argenis Blair.           This document has been electronically signed by Marcin Hardy MD on April 6, 2023 16:10 CDT

## 2023-04-06 NOTE — PROGRESS NOTES
Family Medicine Residency  Artemio Caceres MD    Subjective:     Argenis Blair is a 63 y.o. female who presents for medication refills.    Osteoporosis -patient reports she obtained her Prolia previously but never returned to our office to have it administered.     Hypothyroidism -patient reports that she has been out of her Synthroid for 2 weeks. Otherwise was taking medication and tolerated it well.    Patient asked about follow-up with orthopedics as she reportedly has torn meniscus that she was seen for previously and would like to be seen again for this.  She reports she was recommended to have intervention for her torn meniscus but she did not want this previously.  She says now she is willing to further discuss intervention options. Informed patient that since she is already established with orthopedics previously she should not need a referral for this but advised her to check with our office and if a referral is needed I will send a new one in.    Patient continues with medications for her other chronic medical conditions such as fibromyalgia and hypertension.  Denies any adverse effects or any other new concerns.    Past Medical Hx:  Past Medical History:   Diagnosis Date   • Allergy to bee sting    • Anemia    • Asthma    • Colitis    • Depression    • Essential (primary) hypertension    • Fibrocystic breast    • Hashimoto's thyroiditis    • Hypertensive left ventricular hypertrophy    • Osteoporosis    • Osteoporosis    • PONV (postoperative nausea and vomiting)    • Primary hyperparathyroidism     s/p parathyroidectomy      • Vitamin D deficiency        Past Surgical Hx:  Past Surgical History:   Procedure Laterality Date   • BLADDER SURGERY  2004   • CHOLECYSTECTOMY  2002   • COLONOSCOPY N/A 1/19/2018    Procedure: COLONOSCOPY;  Surgeon: Samson Smith MD;  Location: [facility] ENDOSCOPY;  Service:    • DILATATION AND CURETTAGE  1986   • ENDOSCOPY N/A 1/19/2018    Procedure:  ESOPHAGOGASTRODUODENOSCOPY--before follow up;  Surgeon: Samson Smith MD;  Location: [facility] ENDOSCOPY;  Service:    • HYSTERECTOMY  2004   • KNEE ARTHROSCOPY Right 3/10/2021    Procedure: arthroscopy of right knee with debridement of medial and lateral meniscus.;  Surgeon: Kofi Cleary MD;  Location: [facility] OR;  Service: Orthopedics;  Laterality: Right;   • PARATHYROID GLAND SURGERY     • TUBAL ABDOMINAL LIGATION  1988   • UPPER GASTROINTESTINAL ENDOSCOPY  01/19/2018       Current Meds:    Current Outpatient Medications:   •  betamethasone dipropionate 0.05 % cream, Apply  topically to the appropriate area as directed 2 (Two) Times a Day., Disp: 45 g, Rfl: 0  •  COLLAGEN PO, Take 1 tablet by mouth Daily., Disp: , Rfl:   •  cyclobenzaprine (FLEXERIL) 10 MG tablet, Take 1 tablet by mouth 3 (Three) Times a Day As Needed for Muscle Spasms., Disp: 90 tablet, Rfl: 0  •  denosumab (PROLIA) 60 MG/ML solution prefilled syringe syringe, Inject 60 mg (1 syringe) into the appropriate area every 6 months., Disp: 1 mL, Rfl: 1  •  famciclovir (Famvir) 500 MG tablet, Take 1 tablet by mouth 2 (Two) Times a Day As Needed (symptom recurrence)., Disp: 20 tablet, Rfl: 0  •  fluticasone (Flonase) 50 MCG/ACT nasal spray, 2 sprays into the nostril(s) as directed by provider Daily., Disp: 9.9 mL, Rfl: 3  •  losartan-hydrochlorothiazide (Hyzaar) 50-12.5 MG per tablet, Take 1 tablet by mouth Daily., Disp: 90 tablet, Rfl: 3  •  magnesium oxide (MAG-OX) 400 MG tablet, Take 1 tablet by mouth Daily., Disp: 30 tablet, Rfl: 3  •  ondansetron (ZOFRAN) 4 MG tablet, Take 1 tablet by mouth Every 8 (Eight) Hours As Needed for Nausea or Vomiting., Disp: 10 tablet, Rfl: 0  •  polyethylene glycol (MIRALAX) packet, Take 17 g by mouth Daily., Disp: 30 each, Rfl: 0  •  Synthroid 200 MCG tablet, Take 1 tablet by mouth Daily., Disp: 30 tablet, Rfl: 3  •  valACYclovir (Valtrex) 500 MG tablet, Take 1 tablet by mouth Daily., Disp: 30 tablet, Rfl: 3  •  " vitamin D3 (Vitamin D) 125 MCG (5000 UT) capsule capsule, Take 1 capsule by mouth Daily., Disp: 30 capsule, Rfl: 11  •  diclofenac (VOLTAREN) 50 MG EC tablet, Take 1 tablet by mouth 2 (Two) Times a Day As Needed (Moderate pain). for pain (Patient not taking: Reported on 4/6/2023), Disp: 60 tablet, Rfl: 5  •  Folic Acid 5 MG capsule, Take 1 capsule by mouth Daily. (Patient not taking: Reported on 4/6/2023), Disp: 90 each, Rfl: 3    Allergies:  Allergies   Allergen Reactions   • Bee Venom Anaphylaxis   • Percocet [Oxycodone-Acetaminophen] Delirium   • Claritin [Loratadine]    • Keflex [Cephalexin] Nausea And Vomiting   • Sulfa Antibiotics        Family Hx:  Family History   Problem Relation Age of Onset   • Diabetes Mother    • Heart disease Mother    • Heart disease Father    • Heart disease Brother    • Cancer Other    • Breast cancer Other    • Hypertension Other    • Lung cancer Other    • Lung disease Other    • Breast cancer Maternal Aunt    • Breast cancer Paternal Aunt         Social History:  Social History     Socioeconomic History   • Marital status:    Tobacco Use   • Smoking status: Never   • Smokeless tobacco: Never   Vaping Use   • Vaping Use: Never used   Substance and Sexual Activity   • Alcohol use: No   • Drug use: No   • Sexual activity: Defer       Review of Systems  Review of Systems   Constitutional: Positive for fatigue.   Musculoskeletal:        Knee pain   Psychiatric/Behavioral: The patient is nervous/anxious.        Objective:     /72   Pulse 82   Temp 97.2 °F (36.2 °C)   Ht 152.4 cm (60\")   Wt 103 kg (227 lb 9.6 oz)   SpO2 98%   BMI 44.45 kg/m²   Physical Exam  Vitals reviewed.   Constitutional:       General: She is not in acute distress.     Appearance: She is obese.   HENT:      Head: Normocephalic and atraumatic.   Neck:      Thyroid: No thyroid mass, thyromegaly or thyroid tenderness.   Cardiovascular:      Rate and Rhythm: Normal rate and regular rhythm.      " Pulses: Normal pulses.   Pulmonary:      Effort: Pulmonary effort is normal. No respiratory distress.      Breath sounds: No rhonchi or rales.   Musculoskeletal:      Right lower leg: No edema.      Left lower leg: No edema.   Skin:     General: Skin is warm.   Neurological:      Mental Status: She is alert. Mental status is at baseline.   Psychiatric:         Mood and Affect: Affect is tearful.         Speech: Speech normal.         Behavior: Behavior is cooperative.          Assessment/Plan:     Diagnoses and all orders for this visit:    1. Essential hypertension (Primary)  -     losartan-hydrochlorothiazide (Hyzaar) 50-12.5 MG per tablet; Take 1 tablet by mouth Daily.  Dispense: 90 tablet; Refill: 3  -     Comprehensive metabolic panel; Future    2. Fibromyalgia  -     cyclobenzaprine (FLEXERIL) 10 MG tablet; Take 1 tablet by mouth 3 (Three) Times a Day As Needed for Muscle Spasms.  Dispense: 90 tablet; Refill: 0    3. Hypomagnesemia  -     magnesium oxide (MAG-OX) 400 MG tablet; Take 1 tablet by mouth Daily.  Dispense: 30 tablet; Refill: 3  -     Comprehensive metabolic panel; Future    4. Hypothyroidism due to Hashimoto's thyroiditis  -     Synthroid 200 MCG tablet; Take 1 tablet by mouth Daily.  Dispense: 30 tablet; Refill: 3  -     TSH; Future    5. Fever blister  -     valACYclovir (Valtrex) 500 MG tablet; Take 1 tablet by mouth Daily.  Dispense: 30 tablet; Refill: 3  -     famciclovir (Famvir) 500 MG tablet; Take 1 tablet by mouth 2 (Two) Times a Day As Needed (symptom recurrence).  Dispense: 20 tablet; Refill: 0    6. Oral ulcer  -     valACYclovir (Valtrex) 500 MG tablet; Take 1 tablet by mouth Daily.  Dispense: 30 tablet; Refill: 3    7. Osteoporosis without current pathological fracture, unspecified osteoporosis type  -     vitamin D3 (Vitamin D) 125 MCG (5000 UT) capsule capsule; Take 1 capsule by mouth Daily.  Dispense: 30 capsule; Refill: 11  -     Comprehensive metabolic panel; Future      1.   Chronic, controlled on current medical therapy.  We will continue Hyzaar at current dosage.    2.  Chronic, controlled on current medical therapy.  We will continue Flexeril at current dosage.    4.  Chronic, controlled with compliance to medication.  Advised patient that we will obtain thyroid labs in 1 month after patient has had compliance with medication.  Depending on results can make medication adjustments at that time.  Have ordered labs at this time to be completed at or before next visit.    7.  Chronic, uncontrolled.  Counseled patient to make appointment as soon as possible with clinical pharmacist at our office so that Prolia injection can be administered.  At follow-up appointment in 1 month we will repeat CMP to monitor calcium levels and effects of medication.  Patient to continue with vitamin D supplementation.  Patient's DEXA scan results on 10/19/2022 congruent with osteoporosis.    Depression screening: Patient screened positive for depression based on a PHQ-9 score of 0 on 9/29/2022. Patient refused PHQ-9 and CLAYTON-7 at this visit.      Follow-up:     Return in about 4 weeks (around 5/4/2023) for Recheck Osteoporosis, Hypothyroidism.    RISK SCORE: 3      This document has been electronically signed by Artemio Caceres MD on April 6, 2023 15:55 CDT

## 2023-04-07 DIAGNOSIS — G89.29 CHRONIC PAIN OF RIGHT KNEE: Primary | ICD-10-CM

## 2023-04-07 DIAGNOSIS — M25.561 CHRONIC PAIN OF RIGHT KNEE: Primary | ICD-10-CM

## 2023-04-11 ENCOUNTER — OFFICE VISIT (OUTPATIENT)
Dept: ORTHOPEDIC SURGERY | Facility: CLINIC | Age: 63
End: 2023-04-11
Payer: COMMERCIAL

## 2023-04-11 VITALS — HEIGHT: 60 IN | BODY MASS INDEX: 44.57 KG/M2 | WEIGHT: 227 LBS

## 2023-04-11 DIAGNOSIS — M17.11 OSTEOARTHRITIS OF RIGHT KNEE, UNSPECIFIED OSTEOARTHRITIS TYPE: ICD-10-CM

## 2023-04-11 DIAGNOSIS — G89.29 CHRONIC PAIN OF RIGHT KNEE: Primary | ICD-10-CM

## 2023-04-11 DIAGNOSIS — M25.561 CHRONIC PAIN OF RIGHT KNEE: Primary | ICD-10-CM

## 2023-04-11 DIAGNOSIS — M23.91 INTERNAL DERANGEMENT OF KNEE, RIGHT: ICD-10-CM

## 2023-04-11 DIAGNOSIS — Z98.890 HISTORY OF ARTHROSCOPY OF RIGHT KNEE: ICD-10-CM

## 2023-04-11 PROCEDURE — 99214 OFFICE O/P EST MOD 30 MIN: CPT | Performed by: NURSE PRACTITIONER

## 2023-04-11 NOTE — PROGRESS NOTES
"Argenis Blair is a 63 y.o. female returns for     Chief Complaint   Patient presents with   • Right Knee - Follow-up       HISTORY OF PRESENT ILLNESS:    Mrs. Blair is a 63-year-old female who presents today requesting to discuss surgical options for her right knee.  Patient reports chronic right knee pain for the past few years.  In March 2021 she underwent a right knee arthroscopy with lateral and medial meniscus debridement.  She has also tried Voltaren, Flexeril, and activity modification.  Patient reports aching and grinding.  Patient reports pain with activity and intermittent swelling.  Patient denies recent injury.  He is sent for updated x-rays.       CONCURRENT MEDICAL HISTORY:    The following portions of the patient's history were reviewed and updated as appropriate: allergies, current medications, past family history, past medical history, past social history, past surgical history and problem list.     ROS  No fevers or chills.  No chest pain or shortness of air.  No GI or  disturbances.  Right knee pain.    PHYSICAL EXAMINATION:       Ht 152.4 cm (60\")   Wt 103 kg (227 lb)   BMI 44.33 kg/m²     Physical Exam  Vitals and nursing note reviewed.   Constitutional:       General: She is not in acute distress.     Appearance: She is well-developed. She is not toxic-appearing or diaphoretic.   HENT:      Head: Normocephalic.   Eyes:      General: No scleral icterus.  Pulmonary:      Effort: Pulmonary effort is normal. No respiratory distress.   Skin:     General: Skin is warm and dry.   Neurological:      Mental Status: She is alert and oriented to person, place, and time.   Psychiatric:         Behavior: Behavior normal.         Thought Content: Thought content normal.         Judgment: Judgment normal.         GAIT:     []  Normal  [x]  Antalgic    Assistive device: [x]  None  []  Walker     []  Crutches  []  Cane     []  Wheelchair  []  Stretcher    Right Knee Exam     Tenderness   Right knee tenderness " location: diffuse but worst at lateral joint line     Range of Motion   Extension: -5   Flexion: 120     Tests   Varus: negative Valgus: negative    Other   Erythema: absent  Swelling: mild    Comments:  And limitations with arc of motion.  No evidence of infection                      ASSESSMENT:    Diagnoses and all orders for this visit:    Chronic pain of right knee    Osteoarthritis of right knee, unspecified osteoarthritis type    Internal derangement of knee, right    History of arthroscopy of right knee          PLAN    X-rays reviewed, osteoarthritic changes noted but no acute bony abnormality identified.  We discussed other conservative treatment options including viscosupplementation and intra-articular knee injections.  Patient is not interested in injections and is looking for a more permanent solution to her knee pain.  She states she has discussed TKA in the past with Dr. Cleary and would like to proceed with this.  Patient's current BMI is 44.3.  Patient is not diabetic.  Patient made an appointment later in April to discuss surgical options with Dr. Cleary.  Patient encouraged to continue weight loss efforts in interim.      Time spent of a minimum of 30 minutes including the face to face evaluation, reviewing of medical history and prior medial records, reviewing of diagnostic studies, ordering additional tests, documentation, patient education and coordination of care.         Recommend the following:    -Rest and activity modification as tolerated and based on pain.  -Modified weightbearing of the affected extremity with use of a cane or walker as needed.  -Gradual progression of weightbearing and activity as pain and swelling allow.  -Conditioning and strengthening exercises of the bilateral knees/legs.  -Elevation and ice therapy to the affected knee to minimize pain/swelling/inflammation.       EMR Dragon/Transciption Disclaimer: Some of this note may be an electronic transcription/translation  of spoken language to printed text.  The electronic translation of spoken language may permit erroneous, or at times, nonsensical words or phrases to be inadvertently transcribed. Although I have reviewed the note for such errors, some may still exist.         This document has been electronically signed by Pippa BRO on April 11, 2023 13:08 CDT

## 2023-04-25 ENCOUNTER — OFFICE VISIT (OUTPATIENT)
Dept: ORTHOPEDIC SURGERY | Facility: CLINIC | Age: 63
End: 2023-04-25
Payer: COMMERCIAL

## 2023-04-25 VITALS — BODY MASS INDEX: 45.33 KG/M2 | WEIGHT: 230.9 LBS | HEIGHT: 60 IN

## 2023-04-25 DIAGNOSIS — M17.11 PRIMARY OSTEOARTHRITIS OF RIGHT KNEE: Primary | ICD-10-CM

## 2023-04-25 DIAGNOSIS — G89.29 CHRONIC PAIN OF RIGHT KNEE: ICD-10-CM

## 2023-04-25 DIAGNOSIS — M25.561 CHRONIC PAIN OF RIGHT KNEE: ICD-10-CM

## 2023-04-25 DIAGNOSIS — Z98.890 HISTORY OF ARTHROSCOPY OF RIGHT KNEE: ICD-10-CM

## 2023-04-25 DIAGNOSIS — M23.91 INTERNAL DERANGEMENT OF KNEE, RIGHT: ICD-10-CM

## 2023-04-25 PROCEDURE — 99213 OFFICE O/P EST LOW 20 MIN: CPT | Performed by: ORTHOPAEDIC SURGERY

## 2023-04-25 NOTE — PROGRESS NOTES
"Argenis Blair is a 63 y.o. female returns for     Chief Complaint   Patient presents with   • Right Knee - Follow-up       HISTORY OF PRESENT ILLNESS:  Patient states that she stopped taking diclofenac for pain every day.  She has pain with activities of daily living.  Her pain limits her activity.  No fevers or chills.     CONCURRENT MEDICAL HISTORY:    The following portions of the patient's history were reviewed and updated as appropriate: allergies, current medications, past family history, past medical history, past social history, past surgical history and problem list.     ROS  No fevers or chills.  No chest pain or shortness of air.  No GI or  disturbances.    PHYSICAL EXAMINATION:       Ht 152.4 cm (60\")   Wt 105 kg (230 lb 14.4 oz)   BMI 45.09 kg/m²     Physical Exam  Constitutional:       General: She is not in acute distress.     Appearance: Normal appearance.   Pulmonary:      Effort: Pulmonary effort is normal. No respiratory distress.   Neurological:      Mental Status: She is alert and oriented to person, place, and time.         GAIT:     []  Normal  [x]  Antalgic    Assistive device: [x]  None  []  Walker     []  Crutches  []  Cane     []  Wheelchair  []  Stretcher    Right Knee Exam     Muscle Strength   The patient has normal right knee strength.    Tenderness   Right knee tenderness location: diffuse.    Range of Motion   Extension: -5   Flexion: 100     Tests   Varus: negative Valgus: negative  Drawer:  Anterior - negative    Posterior - negative    Other   Sensation: normal  Pulse: present  Swelling: mild    Comments:  Crepitation on motion.  Mild to moderate pain through arc of motion              XR Knee Bilateral AP Standing, XR Knee 1 or 2 View Right    Result Date: 4/11/2023  Narrative: History: Knee pain. COMPARISON: None. FINDINGS: AP views of both knees and lateral view of the right knee demonstrate moderate lateral compartment osteoarthritis of the right knee. There is no fracture, " dislocation or joint effusion.     Impression: Moderate lateral compartment osteoarthritis of the right knee.            ASSESSMENT:    Diagnoses and all orders for this visit:    Primary osteoarthritis of right knee    Chronic pain of right knee    Internal derangement of knee, right    History of arthroscopy of right knee    Other orders  -     GARLIC PO; Take  by mouth.          PLAN    Long discussion was had with patient regarding further treatment options.  She has significant arthritic change in her right knee.  She has had an elevated body mass index over the last several weeks.  We discussed continued healthy lifestyle choices and weight management.  We discussed the possibility of eventual total knee arthroplasty.  Cane or crutch for support with ambulation.  Continue general strength and conditioning exercises    Return in about 6 weeks (around 6/6/2023) for recheck.    Kofi Cleary MD

## 2023-05-01 ENCOUNTER — LAB (OUTPATIENT)
Dept: LAB | Facility: HOSPITAL | Age: 63
End: 2023-05-01
Payer: COMMERCIAL

## 2023-05-01 ENCOUNTER — OFFICE VISIT (OUTPATIENT)
Dept: FAMILY MEDICINE CLINIC | Facility: CLINIC | Age: 63
End: 2023-05-01
Payer: COMMERCIAL

## 2023-05-01 VITALS
HEART RATE: 101 BPM | OXYGEN SATURATION: 96 % | HEIGHT: 60 IN | DIASTOLIC BLOOD PRESSURE: 74 MMHG | WEIGHT: 223.5 LBS | BODY MASS INDEX: 43.88 KG/M2 | SYSTOLIC BLOOD PRESSURE: 128 MMHG | TEMPERATURE: 96.5 F

## 2023-05-01 DIAGNOSIS — R30.0 BURNING WITH URINATION: ICD-10-CM

## 2023-05-01 DIAGNOSIS — E03.8 HYPOTHYROIDISM DUE TO HASHIMOTO'S THYROIDITIS: ICD-10-CM

## 2023-05-01 DIAGNOSIS — M81.0 OSTEOPOROSIS WITHOUT CURRENT PATHOLOGICAL FRACTURE, UNSPECIFIED OSTEOPOROSIS TYPE: ICD-10-CM

## 2023-05-01 DIAGNOSIS — E83.42 HYPOMAGNESEMIA: ICD-10-CM

## 2023-05-01 DIAGNOSIS — I10 ESSENTIAL HYPERTENSION: ICD-10-CM

## 2023-05-01 DIAGNOSIS — R73.9 HYPERGLYCEMIA: ICD-10-CM

## 2023-05-01 DIAGNOSIS — E06.3 HYPOTHYROIDISM DUE TO HASHIMOTO'S THYROIDITIS: ICD-10-CM

## 2023-05-01 DIAGNOSIS — Z23 IMMUNIZATION DUE: ICD-10-CM

## 2023-05-01 DIAGNOSIS — M81.0 OSTEOPOROSIS WITHOUT CURRENT PATHOLOGICAL FRACTURE, UNSPECIFIED OSTEOPOROSIS TYPE: Primary | ICD-10-CM

## 2023-05-01 DIAGNOSIS — M17.11 PRIMARY OSTEOARTHRITIS OF RIGHT KNEE: ICD-10-CM

## 2023-05-01 DIAGNOSIS — E66.01 MORBID (SEVERE) OBESITY DUE TO EXCESS CALORIES: ICD-10-CM

## 2023-05-01 LAB
BILIRUB BLD-MCNC: NEGATIVE MG/DL
CLARITY, POC: CLEAR
COLOR UR: YELLOW
GLUCOSE UR STRIP-MCNC: NEGATIVE MG/DL
KETONES UR QL: NEGATIVE
LEUKOCYTE EST, POC: NEGATIVE
NITRITE UR-MCNC: NEGATIVE MG/ML
PH UR: 5.5 [PH] (ref 5–8)
PROT UR STRIP-MCNC: NEGATIVE MG/DL
RBC # UR STRIP: NEGATIVE /UL
SP GR UR: 1.03 (ref 1–1.03)
UROBILINOGEN UR QL: NORMAL

## 2023-05-01 PROCEDURE — 84443 ASSAY THYROID STIM HORMONE: CPT

## 2023-05-01 PROCEDURE — 36415 COLL VENOUS BLD VENIPUNCTURE: CPT

## 2023-05-01 PROCEDURE — 83036 HEMOGLOBIN GLYCOSYLATED A1C: CPT | Performed by: STUDENT IN AN ORGANIZED HEALTH CARE EDUCATION/TRAINING PROGRAM

## 2023-05-01 PROCEDURE — 80053 COMPREHEN METABOLIC PANEL: CPT

## 2023-05-01 NOTE — PROGRESS NOTES
Family Medicine Residency  Artemio Caceres MD    Subjective:     Argenis Blair is a 63 y.o. female who presents follow-up for osteoporosis, osteoarthritis, and obesity.    Patient reports her last Prolia injection was .  She reports she does have a refill at the pharmacy she thinks.  Continues with vitamin D supplementation.    Was seen by orthopedics for the osteoarthritis of her right knee and informed that they would be unable to do the surgery until patient's BMI was less than 40.  Patient reports she used to take diclofenac for her osteoarthritis but saw on one of her EKGs that she had left ventricular hypertrophy and was concerned for taking this medicine so stopped.    Patient also reports having suprapubic pain with urination.  Denies any fever or CVA tenderness.    Past Medical Hx:  Past Medical History:   Diagnosis Date   • Allergy to bee sting    • Anemia    • Asthma    • Colitis    • Depression    • Essential (primary) hypertension    • Fibrocystic breast    • Hashimoto's thyroiditis    • Hypertensive left ventricular hypertrophy    • Osteoporosis    • Osteoporosis    • PONV (postoperative nausea and vomiting)    • Primary hyperparathyroidism     s/p parathyroidectomy      • Vitamin D deficiency        Past Surgical Hx:  Past Surgical History:   Procedure Laterality Date   • BLADDER SURGERY     • CHOLECYSTECTOMY     • COLONOSCOPY N/A 2018    Procedure: COLONOSCOPY;  Surgeon: Samson Smith MD;  Location: [facility] ENDOSCOPY;  Service:    • DILATATION AND CURETTAGE     • ENDOSCOPY N/A 2018    Procedure: ESOPHAGOGASTRODUODENOSCOPY--before follow up;  Surgeon: Samson Smith MD;  Location: [facility] ENDOSCOPY;  Service:    • HYSTERECTOMY     • KNEE ARTHROSCOPY Right 3/10/2021    Procedure: arthroscopy of right knee with debridement of medial and lateral meniscus.;  Surgeon: Kofi Cleary MD;  Location: [facility] OR;  Service: Orthopedics;  Laterality: Right;   •  PARATHYROID GLAND SURGERY     • TUBAL ABDOMINAL LIGATION  1988   • UPPER GASTROINTESTINAL ENDOSCOPY  01/19/2018       Current Meds:    Current Outpatient Medications:   •  cyclobenzaprine (FLEXERIL) 10 MG tablet, Take 1 tablet by mouth 3 (Three) Times a Day As Needed for Muscle Spasms., Disp: 90 tablet, Rfl: 0  •  denosumab (PROLIA) 60 MG/ML solution prefilled syringe syringe, Inject 60 mg (1 syringe) into the appropriate area every 6 months., Disp: 1 mL, Rfl: 1  •  famciclovir (Famvir) 500 MG tablet, Take 1 tablet by mouth 2 (Two) Times a Day As Needed (symptom recurrence)., Disp: 20 tablet, Rfl: 0  •  fluticasone (Flonase) 50 MCG/ACT nasal spray, 2 sprays into the nostril(s) as directed by provider Daily., Disp: 9.9 mL, Rfl: 3  •  GARLIC PO, Take  by mouth., Disp: , Rfl:   •  losartan-hydrochlorothiazide (Hyzaar) 50-12.5 MG per tablet, Take 1 tablet by mouth Daily., Disp: 90 tablet, Rfl: 3  •  magnesium oxide (MAG-OX) 400 MG tablet, Take 1 tablet by mouth Daily., Disp: 30 tablet, Rfl: 3  •  ondansetron (ZOFRAN) 4 MG tablet, Take 1 tablet by mouth Every 8 (Eight) Hours As Needed for Nausea or Vomiting., Disp: 10 tablet, Rfl: 0  •  polyethylene glycol (MIRALAX) packet, Take 17 g by mouth Daily., Disp: 30 each, Rfl: 0  •  Synthroid 200 MCG tablet, Take 1 tablet by mouth Daily., Disp: 30 tablet, Rfl: 3  •  valACYclovir (Valtrex) 500 MG tablet, Take 1 tablet by mouth Daily., Disp: 30 tablet, Rfl: 3  •  vitamin D3 (Vitamin D) 125 MCG (5000 UT) capsule capsule, Take 1 capsule by mouth Daily., Disp: 30 capsule, Rfl: 11  •  diclofenac (VOLTAREN) 50 MG EC tablet, Take 1 tablet by mouth 2 (Two) Times a Day As Needed (Shoulder pain and/or knee pain)., Disp: 60 tablet, Rfl: 3  •  Diclofenac Sodium (Voltaren) 1 % gel gel, Apply 4 g topically to the appropriate area as directed 4 (Four) Times a Day As Needed (Mild pain)., Disp: 100 g, Rfl: 3    Allergies:  Allergies   Allergen Reactions   • Bee Venom Anaphylaxis   • Percocet  "[Oxycodone-Acetaminophen] Delirium   • Claritin [Loratadine]    • Keflex [Cephalexin] Nausea And Vomiting   • Sulfa Antibiotics        Family Hx:  Family History   Problem Relation Age of Onset   • Diabetes Mother    • Heart disease Mother    • Heart disease Father    • Heart disease Brother    • Cancer Other    • Breast cancer Other    • Hypertension Other    • Lung cancer Other    • Lung disease Other    • Breast cancer Maternal Aunt    • Breast cancer Paternal Aunt         Social History:  Social History     Socioeconomic History   • Marital status:    Tobacco Use   • Smoking status: Never   • Smokeless tobacco: Never   Vaping Use   • Vaping Use: Never used   Substance and Sexual Activity   • Alcohol use: No   • Drug use: No   • Sexual activity: Defer       Review of Systems  Review of Systems   Genitourinary: Positive for dysuria.   Musculoskeletal: Positive for arthralgias and gait problem.        Knee pain     Psychiatric/Behavioral: The patient is nervous/anxious.        Objective:     /74   Pulse 101   Temp 96.5 °F (35.8 °C)   Ht 152.4 cm (60\")   Wt 101 kg (223 lb 8 oz)   SpO2 96%   BMI 43.65 kg/m²   Physical Exam  Constitutional:       General: She is not in acute distress.  HENT:      Head: Normocephalic and atraumatic.   Cardiovascular:      Rate and Rhythm: Normal rate and regular rhythm.   Pulmonary:      Effort: Pulmonary effort is normal. No respiratory distress.   Abdominal:      Tenderness: There is abdominal tenderness in the suprapubic area.   Musculoskeletal:      Right knee: No swelling or effusion. Decreased range of motion. Tenderness present.   Skin:     General: Skin is warm.   Neurological:      Mental Status: She is alert. Mental status is at baseline.   Psychiatric:         Mood and Affect: Mood is anxious.         Speech: Speech normal.         Behavior: Behavior is cooperative.          Assessment/Plan:     Diagnoses and all orders for this visit:    1. Osteoporosis " without current pathological fracture, unspecified osteoporosis type (Primary)  -     denosumab (PROLIA) 60 MG/ML solution prefilled syringe syringe; Inject 60 mg (1 syringe) into the appropriate area every 6 months.  Dispense: 1 mL; Refill: 1    2. Primary osteoarthritis of right knee  -     diclofenac (VOLTAREN) 50 MG EC tablet; Take 1 tablet by mouth 2 (Two) Times a Day As Needed (Shoulder pain and/or knee pain).  Dispense: 60 tablet; Refill: 3  -     Diclofenac Sodium (Voltaren) 1 % gel gel; Apply 4 g topically to the appropriate area as directed 4 (Four) Times a Day As Needed (Mild pain).  Dispense: 100 g; Refill: 3    3. Hyperglycemia  -     Hemoglobin A1c    4. Burning with urination  -     POCT urinalysis dipstick, manual    5. Immunization due  -     Pneumococcal Conjugate Vaccine 20-Valent (PCV20)  -     Varicella-Zoster Vaccine Subcutaneous    6. Morbid (severe) obesity due to excess calories      1.  Chronic, noncompliant with treatment thus far.  Encouraged patient to obtain refill of Prolia injection and come back to office this week to have medicine administered by clinical pharmacist or clinic staff.  DEXA scan on September 2022 showed osteoporosis with T score of -2.5 in the spine at L1-L4.  No unusual fractures at this time.    2-3.  Chronic, uncontrolled.  Counseled patient that she should be safe to start Voltaren again and will also add Voltaren gel.  Patient's obesity likely contributing to osteoarthritis and patient could benefit from weight loss.  Patient does have history of hyperglycemia so we will obtain hemoglobin A1c and assess for diabetes.  Believe patient could benefit from GLP-1 therapy to aid in weight loss so that patient is able to lose require weight necessarily for surgery and help improve recovery and quality of life for after surgery.    4.  Acute, stable.  POCT UA in office negative for nitrites, leukocytes, or any other signs of infection.  Patient counseled that if symptoms  do not improve or worsen or if fever or CVA tenderness develop she is to call our office and let us know we will send in antibiotic therapy.  If symptoms significantly worsen she is to go to the ED.    5. “Discussed risks/benefits to vaccination, reviewed components of the vaccine, discussed VIS, discussed informed consent, informed consent obtained. Patient/Parent was allowed to accept or refuse vaccine. Questions answered to satisfactory state of patient/Parent. We reviewed typical age appropriate and seasonally appropriate vaccinations. Reviewed immunization history and updated state vaccination form as needed. Patient was counseled on Prevnar 20  Zoster       Follow-up:     Return in about 3 months (around 8/1/2023) for Recheck Osteoporosis, OA.    RISK SCORE: 3      This document has been electronically signed by Artemio Caceres MD on May 2, 2023 10:50 CDT

## 2023-05-02 ENCOUNTER — TELEPHONE (OUTPATIENT)
Dept: FAMILY MEDICINE CLINIC | Facility: CLINIC | Age: 63
End: 2023-05-02
Payer: COMMERCIAL

## 2023-05-02 DIAGNOSIS — Z98.890 STATUS POST ARTHROSCOPIC SURGERY OF RIGHT KNEE: ICD-10-CM

## 2023-05-02 DIAGNOSIS — L40.9 PSORIASIS: Primary | ICD-10-CM

## 2023-05-02 DIAGNOSIS — M17.11 PRIMARY OSTEOARTHRITIS OF RIGHT KNEE: Primary | ICD-10-CM

## 2023-05-02 DIAGNOSIS — E66.01 MORBID (SEVERE) OBESITY DUE TO EXCESS CALORIES: ICD-10-CM

## 2023-05-02 LAB
ALBUMIN SERPL-MCNC: 4.7 G/DL (ref 3.5–5.2)
ALBUMIN/GLOB SERPL: 1.9 G/DL
ALP SERPL-CCNC: 73 U/L (ref 39–117)
ALT SERPL W P-5'-P-CCNC: 17 U/L (ref 1–33)
ANION GAP SERPL CALCULATED.3IONS-SCNC: 17.5 MMOL/L (ref 5–15)
AST SERPL-CCNC: 23 U/L (ref 1–32)
BILIRUB SERPL-MCNC: 0.9 MG/DL (ref 0–1.2)
BUN SERPL-MCNC: 16 MG/DL (ref 8–23)
BUN/CREAT SERPL: 13.4 (ref 7–25)
CALCIUM SPEC-SCNC: 9.5 MG/DL (ref 8.6–10.5)
CHLORIDE SERPL-SCNC: 102 MMOL/L (ref 98–107)
CO2 SERPL-SCNC: 23.5 MMOL/L (ref 22–29)
CREAT SERPL-MCNC: 1.19 MG/DL (ref 0.57–1)
EGFRCR SERPLBLD CKD-EPI 2021: 51.5 ML/MIN/1.73
GLOBULIN UR ELPH-MCNC: 2.5 GM/DL
GLUCOSE SERPL-MCNC: 105 MG/DL (ref 65–99)
HBA1C MFR BLD: 6.3 % (ref 4.8–5.6)
POTASSIUM SERPL-SCNC: 4.1 MMOL/L (ref 3.5–5.2)
PROT SERPL-MCNC: 7.2 G/DL (ref 6–8.5)
SODIUM SERPL-SCNC: 143 MMOL/L (ref 136–145)
TSH SERPL DL<=0.05 MIU/L-ACNC: 4.97 UIU/ML (ref 0.27–4.2)

## 2023-05-02 NOTE — TELEPHONE ENCOUNTER
Incoming Refill Request      Medication requested (name and dose):   FLUOCINONIDE CREAM USP, 0.05%  Pharmacy where request should be sent:   [facility] [place]   Additional details provided by patient:     Best call back number: [phone]    Does the patient have less than a 3 day supply:  [x] Yes  [] No    Eli Lundy, [employer] Rep  05/02/23, 10:26 CDT

## 2023-05-10 DIAGNOSIS — E03.8 HYPOTHYROIDISM DUE TO HASHIMOTO'S THYROIDITIS: ICD-10-CM

## 2023-05-10 DIAGNOSIS — E06.3 HYPOTHYROIDISM DUE TO HASHIMOTO'S THYROIDITIS: ICD-10-CM

## 2023-05-10 RX ORDER — LEVOTHYROXINE SODIUM 200 MCG
200 TABLET ORAL DAILY
Qty: 30 TABLET | Refills: 3 | Status: SHIPPED | OUTPATIENT
Start: 2023-05-10

## 2023-06-08 DIAGNOSIS — M79.7 FIBROMYALGIA: ICD-10-CM

## 2023-06-08 RX ORDER — CYCLOBENZAPRINE HCL 10 MG
10 TABLET ORAL 3 TIMES DAILY PRN
Qty: 90 TABLET | Refills: 0 | Status: SHIPPED | OUTPATIENT
Start: 2023-06-08 | End: 2023-06-08 | Stop reason: SDUPTHER

## 2023-06-08 RX ORDER — CYCLOBENZAPRINE HCL 10 MG
10 TABLET ORAL 3 TIMES DAILY PRN
Qty: 90 TABLET | Refills: 0 | Status: SHIPPED | OUTPATIENT
Start: 2023-06-08

## 2023-06-09 ENCOUNTER — OFFICE VISIT (OUTPATIENT)
Dept: FAMILY MEDICINE CLINIC | Facility: CLINIC | Age: 63
End: 2023-06-09
Payer: COMMERCIAL

## 2023-06-09 VITALS
SYSTOLIC BLOOD PRESSURE: 122 MMHG | HEART RATE: 97 BPM | BODY MASS INDEX: 43.09 KG/M2 | OXYGEN SATURATION: 99 % | TEMPERATURE: 98.2 F | DIASTOLIC BLOOD PRESSURE: 84 MMHG | WEIGHT: 219.5 LBS | HEIGHT: 60 IN

## 2023-06-09 DIAGNOSIS — Z12.11 ENCOUNTER FOR SCREENING COLONOSCOPY: ICD-10-CM

## 2023-06-09 DIAGNOSIS — Z00.00 ANNUAL PHYSICAL EXAM: Primary | ICD-10-CM

## 2023-06-09 DIAGNOSIS — F43.81 PROLONGED GRIEF DISORDER: ICD-10-CM

## 2023-06-09 DIAGNOSIS — L71.9 ROSACEA: ICD-10-CM

## 2023-06-09 NOTE — PROGRESS NOTES
Family Medicine Residency  Artemio Caceres MD    Subjective:     Argenis Blair is a 63 y.o. female who presents for Annual physical.     Patient reports reactivation of her rosacea that occurs on her bilateral cheeks.  Right worse than left.  Would like a refill on the cream she usually uses that controls this well.    Otherwise other chronic medical conditions are stable and no other concerns at this time.      Past Medical Hx:  Past Medical History:   Diagnosis Date   • Allergy to bee sting    • Anemia    • Asthma    • Colitis    • Depression    • Essential (primary) hypertension    • Fibrocystic breast    • Hashimoto's thyroiditis    • Hypertensive left ventricular hypertrophy    • Osteoporosis    • Osteoporosis    • PONV (postoperative nausea and vomiting)    • Primary hyperparathyroidism     s/p parathyroidectomy      • Vitamin D deficiency        Past Surgical Hx:  Past Surgical History:   Procedure Laterality Date   • BLADDER SURGERY  2004   • CHOLECYSTECTOMY  2002   • COLONOSCOPY N/A 1/19/2018    Procedure: COLONOSCOPY;  Surgeon: Samson Smith MD;  Location: [facility] ENDOSCOPY;  Service:    • DILATATION AND CURETTAGE  1986   • ENDOSCOPY N/A 1/19/2018    Procedure: ESOPHAGOGASTRODUODENOSCOPY--before follow up;  Surgeon: Samson Smith MD;  Location: [facility] ENDOSCOPY;  Service:    • HYSTERECTOMY  2004   • KNEE ARTHROSCOPY Right 3/10/2021    Procedure: arthroscopy of right knee with debridement of medial and lateral meniscus.;  Surgeon: Kofi Cleary MD;  Location: [facility] OR;  Service: Orthopedics;  Laterality: Right;   • PARATHYROID GLAND SURGERY     • TUBAL ABDOMINAL LIGATION  1988   • UPPER GASTROINTESTINAL ENDOSCOPY  01/19/2018       Current Meds:    Current Outpatient Medications:   •  cyclobenzaprine (FLEXERIL) 10 MG tablet, Take 1 tablet by mouth 3 (Three) Times a Day As Needed for Muscle Spasms., Disp: 90 tablet, Rfl: 0  •  denosumab (PROLIA) 60 MG/ML solution prefilled syringe  syringe, Inject 60 mg (1 syringe) into the appropriate area every 6 months., Disp: 1 mL, Rfl: 1  •  diclofenac (VOLTAREN) 50 MG EC tablet, Take 1 tablet by mouth 2 (Two) Times a Day As Needed (Shoulder pain and/or knee pain)., Disp: 60 tablet, Rfl: 3  •  Diclofenac Sodium (Voltaren) 1 % gel gel, Apply 4 g topically to the appropriate area as directed 4 (Four) Times a Day As Needed (Mild pain)., Disp: 100 g, Rfl: 3  •  famciclovir (Famvir) 500 MG tablet, Take 1 tablet by mouth 2 (Two) Times a Day As Needed (symptom recurrence)., Disp: 20 tablet, Rfl: 0  •  fluocinonide (LIDEX) 0.05 % cream, Apply 1 application topically to the appropriate area as directed 2 (Two) Times a Day., Disp: 60 g, Rfl: 2  •  fluticasone (Flonase) 50 MCG/ACT nasal spray, 2 sprays into the nostril(s) as directed by provider Daily., Disp: 9.9 mL, Rfl: 3  •  GARLIC PO, Take  by mouth., Disp: , Rfl:   •  losartan-hydrochlorothiazide (Hyzaar) 50-12.5 MG per tablet, Take 1 tablet by mouth Daily., Disp: 90 tablet, Rfl: 3  •  magnesium oxide (MAG-OX) 400 MG tablet, Take 1 tablet by mouth Daily., Disp: 30 tablet, Rfl: 3  •  ondansetron (ZOFRAN) 4 MG tablet, Take 1 tablet by mouth Every 8 (Eight) Hours As Needed for Nausea or Vomiting., Disp: 10 tablet, Rfl: 0  •  polyethylene glycol (MIRALAX) packet, Take 17 g by mouth Daily., Disp: 30 each, Rfl: 0  •  Semaglutide,0.25 or 0.5MG/DOS, (OZEMPIC) 2 MG/1.5ML solution pen-injector, Inject 0.25 mg under the skin into the appropriate area as directed 1 (One) Time Per Week., Disp: 1.5 mL, Rfl: 1  •  Synthroid 200 MCG tablet, Take 1 tablet by mouth Daily., Disp: 30 tablet, Rfl: 3  •  valACYclovir (Valtrex) 500 MG tablet, Take 1 tablet by mouth Daily., Disp: 30 tablet, Rfl: 3  •  vitamin D3 (Vitamin D) 125 MCG (5000 UT) capsule capsule, Take 1 capsule by mouth Daily., Disp: 30 capsule, Rfl: 11  •  metroNIDAZOLE (NORITRATE) 1 % cream, Apply 1 application topically to the appropriate area as directed Daily., Disp:  "60 g, Rfl: 2    Allergies:  Allergies   Allergen Reactions   • Bee Venom Anaphylaxis   • Percocet [Oxycodone-Acetaminophen] Delirium   • Claritin [Loratadine]    • Keflex [Cephalexin] Nausea And Vomiting   • Sulfa Antibiotics        Family Hx:  Family History   Problem Relation Age of Onset   • Diabetes Mother    • Heart disease Mother    • Heart disease Father    • Heart disease Brother    • Cancer Other    • Breast cancer Other    • Hypertension Other    • Lung cancer Other    • Lung disease Other    • Breast cancer Maternal Aunt    • Breast cancer Paternal Aunt         Social History:  Social History     Socioeconomic History   • Marital status:    Tobacco Use   • Smoking status: Never   • Smokeless tobacco: Never   Vaping Use   • Vaping Use: Never used   Substance and Sexual Activity   • Alcohol use: No   • Drug use: No   • Sexual activity: Defer       Review of Systems  Review of Systems   Constitutional: Negative for fever.   Respiratory: Negative for shortness of breath.    Cardiovascular: Negative for chest pain and leg swelling.   Gastrointestinal: Negative for abdominal pain, constipation, diarrhea, nausea and vomiting.   Genitourinary: Negative for dysuria.   Skin: Positive for color change and rash.   Neurological: Negative for dizziness and light-headedness.       Objective:     /84   Pulse 97   Temp 98.2 °F (36.8 °C)   Ht 152.4 cm (60\")   Wt 99.6 kg (219 lb 8 oz)   SpO2 99%   BMI 42.87 kg/m²   Physical Exam  Vitals reviewed.   Constitutional:       General: She is not in acute distress.     Appearance: She is obese.   HENT:      Head: Normocephalic and atraumatic.      Right Ear: External ear normal.      Left Ear: External ear normal.      Nose: Nose normal.   Eyes:      Conjunctiva/sclera: Conjunctivae normal.   Cardiovascular:      Rate and Rhythm: Normal rate and regular rhythm.      Pulses: Normal pulses.   Pulmonary:      Effort: Pulmonary effort is normal. No respiratory " distress.      Breath sounds: No rhonchi or rales.   Abdominal:      Palpations: Abdomen is soft.      Tenderness: There is no abdominal tenderness.   Musculoskeletal:      Right lower leg: No edema.      Left lower leg: No edema.   Skin:     General: Skin is warm.      Findings: Rash present. Rash is macular and pustular.      Comments: Macular and pustular rash present on bilateral cheeks, 2 small pustules appreciated on the right cheek   Neurological:      Mental Status: She is alert. Mental status is at baseline.   Psychiatric:         Mood and Affect: Mood is depressed. Affect is tearful.         Speech: Speech normal.         Behavior: Behavior is cooperative.          Assessment/Plan:     Diagnoses and all orders for this visit:    1. Annual physical exam (Primary)  -     Ambulatory Referral For Screening Colonoscopy    2. Encounter for screening colonoscopy  -     Ambulatory Referral For Screening Colonoscopy    3. Rosacea  -     metroNIDAZOLE (NORITRATE) 1 % cream; Apply 1 application topically to the appropriate area as directed Daily.  Dispense: 60 g; Refill: 2    4. Prolonged grief disorder      1.  Patient to continue with current medical therapy for chronic medical conditions.  Patient to follow low calorie diet and perform moderate exercise for 150 minutes weekly.    2.  Patient had previous colonoscopy with polyps removed.  Referral for repeat screening colonoscopy placed.    3.  Chronic, recurrent, uncontrolled.  Have refilled patient's metronidazole cream at this time as this is controlled symptoms well previously.  Patient to return to clinic if symptoms do not improve.    4.  Chronic, stable.  Patient continues to have prolonged grief due to her son dying several years ago.  Patient does not wish to undergo therapy or start medication.  Counseled patient that if symptoms worsen or if SI, plan, or intent occur she is to seek medical care.  Counseled patient that if she wishes to start therapy or  medication she is to return to clinic for further discussion of options.     Follow-up:     Return in about 3 months (around 9/9/2023) for Recheck Thyroid, Osteoporosis.    RISK SCORE: 3      This document has been electronically signed by Artemio Caceres MD on June 12, 2023 18:41 CDT

## 2023-06-15 NOTE — PROGRESS NOTES
I have reviewed the notes, assessments, and/or procedures performed by Artemio Caceres MDduring office visit. I concur with her/his documentation and assessment and plan for Argenis Blair.           This document has been electronically signed by Marcin Hardy MD on Nell 15, 2023 16:09 CDT

## 2023-07-28 DIAGNOSIS — L71.9 ROSACEA: ICD-10-CM

## 2023-08-01 DIAGNOSIS — K12.1 ORAL ULCER: ICD-10-CM

## 2023-08-01 DIAGNOSIS — B00.1 FEVER BLISTER: ICD-10-CM

## 2023-08-01 RX ORDER — VALACYCLOVIR HYDROCHLORIDE 500 MG/1
500 TABLET, FILM COATED ORAL DAILY
Qty: 30 TABLET | Refills: 3 | Status: SHIPPED | OUTPATIENT
Start: 2023-08-01

## 2023-08-09 DIAGNOSIS — M79.7 FIBROMYALGIA: ICD-10-CM

## 2023-08-09 RX ORDER — CYCLOBENZAPRINE HCL 10 MG
10 TABLET ORAL 3 TIMES DAILY PRN
Qty: 90 TABLET | Refills: 0 | Status: SHIPPED | OUTPATIENT
Start: 2023-08-09

## 2023-08-22 DIAGNOSIS — M17.11 PRIMARY OSTEOARTHRITIS OF RIGHT KNEE: ICD-10-CM

## 2023-08-25 DIAGNOSIS — Z98.890 STATUS POST ARTHROSCOPIC SURGERY OF RIGHT KNEE: ICD-10-CM

## 2023-08-25 DIAGNOSIS — E66.01 MORBID (SEVERE) OBESITY DUE TO EXCESS CALORIES: ICD-10-CM

## 2023-08-25 DIAGNOSIS — N18.2 CKD (CHRONIC KIDNEY DISEASE) STAGE 2, GFR 60-89 ML/MIN: Primary | ICD-10-CM

## 2023-08-25 DIAGNOSIS — M17.11 PRIMARY OSTEOARTHRITIS OF RIGHT KNEE: ICD-10-CM

## 2023-08-25 DIAGNOSIS — E88.81 METABOLIC SYNDROME: ICD-10-CM

## 2023-08-25 PROBLEM — E88.810 METABOLIC SYNDROME: Status: ACTIVE | Noted: 2023-08-25

## 2023-09-05 ENCOUNTER — OFFICE VISIT (OUTPATIENT)
Dept: GASTROENTEROLOGY | Facility: CLINIC | Age: 63
End: 2023-09-05
Payer: COMMERCIAL

## 2023-09-05 VITALS
DIASTOLIC BLOOD PRESSURE: 90 MMHG | HEART RATE: 103 BPM | HEIGHT: 60 IN | BODY MASS INDEX: 44.76 KG/M2 | WEIGHT: 228 LBS | SYSTOLIC BLOOD PRESSURE: 140 MMHG | OXYGEN SATURATION: 96 %

## 2023-09-05 DIAGNOSIS — Z12.11 ENCOUNTER FOR SCREENING FOR MALIGNANT NEOPLASM OF COLON: Primary | ICD-10-CM

## 2023-09-05 DIAGNOSIS — Z86.010 HISTORY OF COLON POLYPS: ICD-10-CM

## 2023-09-05 PROCEDURE — S0285 CNSLT BEFORE SCREEN COLONOSC: HCPCS | Performed by: PHYSICIAN ASSISTANT

## 2023-09-05 RX ORDER — SODIUM, POTASSIUM,MAG SULFATES 17.5-3.13G
1 SOLUTION, RECONSTITUTED, ORAL ORAL EVERY 12 HOURS
Qty: 354 ML | Refills: 0 | Status: SHIPPED | OUTPATIENT
Start: 2023-09-05

## 2023-09-05 RX ORDER — DEXTROSE AND SODIUM CHLORIDE 5; .45 G/100ML; G/100ML
30 INJECTION, SOLUTION INTRAVENOUS CONTINUOUS PRN
OUTPATIENT
Start: 2023-09-05

## 2023-09-05 NOTE — H&P (VIEW-ONLY)
Chief Complaint   Patient presents with    Colon Cancer Screening       ENDO PROCEDURE ORDERED: COLON screen    Subjective    Argenis Blair is a 63 y.o. female. she is being seen for consultation today at the request of Artemio Caceres MD    History of Present Illness    Patient sent for evaluation for colonoscopy by Dr. Caceres who last saw the patient for a physical 06/09/2023.  Last seen with fatty liver 01/25/2018, F0/S2/N1.  Patient currently denies abdominal pain, heartburn, nausea, vomiting, dysphagia.  Bowels are moving without blood or mucus.  Does take MiraLAX as needed for constipation.  Weight is up 7 pounds since last visit.  Last EGD/colonoscopy 01/19/2018 showed esophagitis, gastritis, hemorrhoids, diverticulosis. Most recent available laboratories 05/01/2023 CMP showed a glucose 105, creatinine 1.19, GFR 51.5, otherwise normal. TSH was elevated at 4.97, A1c 6.9, negative urinalysis.      A/P:  Patient due for a screening colonoscopy, known diverticular disease.  Will plan further pending clinical course and the results of the above.      Thank you very much Dr. Caceres for involving us in the care of your patient.  Will keep you informed.             The following portions of the patient's history were reviewed and updated as appropriate:   Past Medical History:   Diagnosis Date    Allergy to bee sting     Anemia     Asthma     Colitis     Depression     Essential (primary) hypertension     Fibrocystic breast     Hashimoto's thyroiditis     Hypertensive left ventricular hypertrophy     Osteoporosis     Osteoporosis     PONV (postoperative nausea and vomiting)     Primary hyperparathyroidism     s/p parathyroidectomy       Vitamin D deficiency      Past Surgical History:   Procedure Laterality Date    BLADDER SURGERY  2004    CHOLECYSTECTOMY  2002    COLONOSCOPY N/A 1/19/2018    Procedure: COLONOSCOPY;  Surgeon: Samson Smith MD;  Location: [facility] ENDOSCOPY;  Service:     DILATATION AND CURETTAGE   1986    ENDOSCOPY N/A 2018    Procedure: ESOPHAGOGASTRODUODENOSCOPY--before follow up;  Surgeon: Samson Smith MD;  Location: [facility] [place] ENDOSCOPY;  Service:     HYSTERECTOMY  2004    KNEE ARTHROSCOPY Right 3/10/2021    Procedure: arthroscopy of right knee with debridement of medial and lateral meniscus.;  Surgeon: Kofi Cleary MD;  Location: [facility] [place] OR;  Service: Orthopedics;  Laterality: Right;    PARATHYROID GLAND SURGERY      TUBAL ABDOMINAL LIGATION      UPPER GASTROINTESTINAL ENDOSCOPY  2018     Family History   Problem Relation Age of Onset    Diabetes Mother     Heart disease Mother     Heart disease Father     Heart disease Brother     Cancer Other     Breast cancer Other     Hypertension Other     Lung cancer Other     Lung disease Other     Breast cancer Maternal Aunt     Breast cancer Paternal Aunt      OB History          2    Para   2    Term   2            AB        Living             SAB        IAB        Ectopic        Molar        Multiple        Live Births                  Allergies   Allergen Reactions    Bee Venom Anaphylaxis    Percocet [Oxycodone-Acetaminophen] Delirium    Claritin [Loratadine]     Keflex [Cephalexin] Nausea And Vomiting    Sulfa Antibiotics      Social History     Socioeconomic History    Marital status:    Tobacco Use    Smoking status: Never    Smokeless tobacco: Never   Vaping Use    Vaping Use: Never used   Substance and Sexual Activity    Alcohol use: No    Drug use: No    Sexual activity: Defer     Current Medications:  Prior to Admission medications    Medication Sig Start Date End Date Taking? Authorizing Provider   cyclobenzaprine (FLEXERIL) 10 MG tablet Take 1 tablet by mouth 3 (Three) Times a Day As Needed for Muscle Spasms. 23  Yes Artemio Caceres MD   denosumab (PROLIA) 60 MG/ML solution prefilled syringe syringe Inject 60 mg (1 syringe) into the appropriate area every 6 months. 23  Yes Artemio Caceres  MD Willy   diclofenac (VOLTAREN) 50 MG EC tablet Take 1 tablet by mouth 2 (Two) Times a Day As Needed (Shoulder pain and/or knee pain). 5/1/23  Yes Artemio Caceres MD   Diclofenac Sodium (Voltaren) 1 % gel gel Apply 4 g topically to the appropriate area as directed 4 (Four) Times a Day As Needed (Mild pain). 8/22/23  Yes Artemio Caceres MD   famciclovir (Famvir) 500 MG tablet Take 1 tablet by mouth 2 (Two) Times a Day As Needed (symptom recurrence). 4/6/23  Yes Artemio Caceres MD   fluocinonide (LIDEX) 0.05 % cream Apply 1 application topically to the appropriate area as directed 2 (Two) Times a Day. 5/2/23  Yes Artemio Caceres MD   fluticasone (Flonase) 50 MCG/ACT nasal spray 2 sprays into the nostril(s) as directed by provider Daily. 1/13/22  Yes Sean Morrison MD   GARLIC PO Take  by mouth.   Yes Emily Alvarez MD   losartan-hydrochlorothiazide (Hyzaar) 50-12.5 MG per tablet Take 1 tablet by mouth Daily. 4/6/23  Yes Artemio Caceres MD   magnesium oxide (MAG-OX) 400 MG tablet Take 1 tablet by mouth Daily. 4/6/23  Yes Artemio Caceres MD   metroNIDAZOLE (NORITRATE) 1 % cream Apply 1 application  topically to the appropriate area as directed Daily. 7/28/23  Yes Artemio Caceres MD   ondansetron (ZOFRAN) 4 MG tablet Take 1 tablet by mouth Every 8 (Eight) Hours As Needed for Nausea or Vomiting. 1/20/21  Yes Juliann Segovia MD   polyethylene glycol (MIRALAX) packet Take 17 g by mouth Daily. 2/14/19  Yes Melisa Valente MD   Semaglutide,0.25 or 0.5MG/DOS, (OZEMPIC) 2 MG/1.5ML solution pen-injector Inject 0.25 mg under the skin into the appropriate area as directed 1 (One) Time Per Week. 8/25/23  Yes Artemio Caceres MD   Synthroid 200 MCG tablet Take 1 tablet by mouth Daily. 5/10/23  Yes Artemio Caceres MD   valACYclovir (Valtrex) 500 MG tablet Take 1 tablet by mouth Daily. 8/1/23  Yes Artemio Caceres MD   vitamin D3  "(Vitamin D) 125 MCG (5000 UT) capsule capsule Take 1 capsule by mouth Daily. 4/6/23  Yes Artemio Caceres MD     Review of Systems  Review of Systems   Constitutional:  Negative for unexpected weight change.   HENT:  Negative for trouble swallowing.    Gastrointestinal:  Positive for abdominal pain.        Objective    /90 (BP Location: Right arm, Patient Position: Sitting, Cuff Size: Adult)   Pulse 103   Ht 152.4 cm (60\")   Wt 103 kg (228 lb)   SpO2 96%   BMI 44.53 kg/m²   Physical Exam  Vitals and nursing note reviewed.   Constitutional:       General: She is not in acute distress.     Appearance: She is well-developed. She is obese.   HENT:      Head: Normocephalic and atraumatic.   Eyes:      Pupils: Pupils are equal, round, and reactive to light.   Cardiovascular:      Rate and Rhythm: Normal rate and regular rhythm.      Heart sounds: Normal heart sounds.   Pulmonary:      Effort: Pulmonary effort is normal.      Breath sounds: Normal breath sounds.   Abdominal:      General: Bowel sounds are normal. There is no distension or abdominal bruit.      Palpations: Abdomen is soft. Abdomen is not rigid. There is no shifting dullness or mass.      Tenderness: There is abdominal tenderness. There is no guarding or rebound.      Hernia: No hernia is present. There is no hernia in the ventral area.   Musculoskeletal:         General: Normal range of motion.      Cervical back: Normal range of motion.   Skin:     General: Skin is warm and dry.   Neurological:      Mental Status: She is alert and oriented to person, place, and time.   Psychiatric:         Behavior: Behavior normal.         Thought Content: Thought content normal.         Judgment: Judgment normal.     Assessment & Plan      1. Encounter for screening for malignant neoplasm of colon    2. History of colon polyps    .   Diagnoses and all orders for this visit:    1. Encounter for screening for malignant neoplasm of colon (Primary)  -     " Case Request; Standing  -     dextrose 5 % and sodium chloride 0.45 % infusion  -     Case Request    2. History of colon polyps  -     Case Request; Standing  -     dextrose 5 % and sodium chloride 0.45 % infusion  -     Case Request    Other orders  -     Obtain Informed Consent; Standing  -     Follow Anesthesia Guidelines / Protocol; Future  -     Obtain Informed Consent; Future  -     POC Glucose Once; Standing  -     sodium-potassium-magnesium sulfates (Suprep Bowel Prep Kit) 17.5-3.13-1.6 GM/177ML solution oral solution; Take 1 bottle by mouth Every 12 (Twelve) Hours.  Dispense: 354 mL; Refill: 0        Orders placed during this encounter include:  Orders Placed This Encounter   Procedures    Obtain Informed Consent     Standing Status:   Future     Order Specific Question:   Informed Consent Given For     Answer:   COLONOSCOPY       Medications prescribed:  New Medications Ordered This Visit   Medications    sodium-potassium-magnesium sulfates (Suprep Bowel Prep Kit) 17.5-3.13-1.6 GM/177ML solution oral solution     Sig: Take 1 bottle by mouth Every 12 (Twelve) Hours.     Dispense:  354 mL     Refill:  0       Requested Prescriptions     Signed Prescriptions Disp Refills    sodium-potassium-magnesium sulfates (Suprep Bowel Prep Kit) 17.5-3.13-1.6 GM/177ML solution oral solution 354 mL 0     Sig: Take 1 bottle by mouth Every 12 (Twelve) Hours.       Review and/or summary of lab tests, radiology, procedures, medications. Review and summary of old records and obtaining of history. The risks and benefits of my recommendations, as well as other treatment options were discussed with the patient today. Questions were answered.    Follow-up: No follow-ups on file.     COLONOSCOPY (N/A)      This document has been electronically signed by Viraj Polanco PA-C on September 18, 2023 19:39 CDT      Results for orders placed or performed in visit on 09/12/23   Rickettsia Species DNA, Real-Time PCR    Specimen: Blood    Result Value Ref Range    Rickettsia rickettsii DNA, RT Not Detected    Lyme Disease Total Antibody With Reflex to Immunoassay    Specimen: Blood   Result Value Ref Range    Lyme Total Antibody EIA Negative Negative   TSH    Specimen: Blood   Result Value Ref Range    TSH 1.000 0.270 - 4.200 uIU/mL   Hemoglobin A1c    Specimen: Blood   Result Value Ref Range    Hemoglobin A1C 6.20 (H) 4.80 - 5.60 %   Results for orders placed or performed in visit on 05/01/23   TSH    Specimen: Blood   Result Value Ref Range    TSH 4.970 (H) 0.270 - 4.200 uIU/mL   Comprehensive metabolic panel    Specimen: Blood   Result Value Ref Range    Glucose 105 (H) 65 - 99 mg/dL    BUN 16 8 - 23 mg/dL    Creatinine 1.19 (H) 0.57 - 1.00 mg/dL    Sodium 143 136 - 145 mmol/L    Potassium 4.1 3.5 - 5.2 mmol/L    Chloride 102 98 - 107 mmol/L    CO2 23.5 22.0 - 29.0 mmol/L    Calcium 9.5 8.6 - 10.5 mg/dL    Total Protein 7.2 6.0 - 8.5 g/dL    Albumin 4.7 3.5 - 5.2 g/dL    ALT (SGPT) 17 1 - 33 U/L    AST (SGOT) 23 1 - 32 U/L    Alkaline Phosphatase 73 39 - 117 U/L    Total Bilirubin 0.9 0.0 - 1.2 mg/dL    Globulin 2.5 gm/dL    A/G Ratio 1.9 g/dL    BUN/Creatinine Ratio 13.4 7.0 - 25.0    Anion Gap 17.5 (H) 5.0 - 15.0 mmol/L    eGFR 51.5 (L) >60.0 mL/min/1.73   Results for orders placed or performed in visit on 05/01/23   POCT urinalysis dipstick, manual    Specimen: Urine   Result Value Ref Range    Color Yellow Yellow, Straw, Dark Yellow, Renetta    Clarity, UA Clear Clear    Glucose, UA Negative Negative mg/dL    Bilirubin Negative Negative    Ketones, UA Negative Negative    Specific Gravity  1.030 1.005 - 1.030    Blood, UA Negative Negative    pH, Urine 5.5 5.0 - 8.0    Protein, POC Negative Negative mg/dL    Urobilinogen, UA 0.2 E.U./dL Normal, 0.2 E.U./dL    Leukocytes Negative Negative    Nitrite, UA Negative Negative   Hemoglobin A1c    Specimen: Blood   Result Value Ref Range    Hemoglobin A1C 6.30 (H) 4.80 - 5.60 %   Results for orders  placed or performed in visit on 10/28/22   QuantiFERON TB Gold    Specimen: Blood   Result Value Ref Range    QuantiFERON Incubation Incubation performed.     QuantiFERON Criteria Comment     QUANTIFERON TB1 AG VALUE 0.03 IU/mL    QUANTIFERON TB2 AG VALUE 0.03 IU/mL    QuantiFERON Nil Value 0.02 IU/mL    QuantiFERON Mitogen Value >10.00 IU/mL    QUANTIFERON-TB GOLD PLUS Negative Negative   Phosphorus    Specimen: Blood   Result Value Ref Range    Phosphorus 4.4 2.5 - 4.5 mg/dL   Magnesium    Specimen: Blood   Result Value Ref Range    Magnesium 1.8 1.6 - 2.4 mg/dL   Comprehensive metabolic panel    Specimen: Blood   Result Value Ref Range    Glucose 121 (H) 65 - 99 mg/dL    BUN 12 8 - 23 mg/dL    Creatinine 0.84 0.57 - 1.00 mg/dL    Sodium 140 136 - 145 mmol/L    Potassium 3.9 3.5 - 5.2 mmol/L    Chloride 102 98 - 107 mmol/L    CO2 27.0 22.0 - 29.0 mmol/L    Calcium 9.4 8.6 - 10.5 mg/dL    Total Protein 6.8 6.0 - 8.5 g/dL    Albumin 4.30 3.50 - 5.20 g/dL    ALT (SGPT) 14 1 - 33 U/L    AST (SGOT) 15 1 - 32 U/L    Alkaline Phosphatase 56 39 - 117 U/L    Total Bilirubin 0.8 0.0 - 1.2 mg/dL    Globulin 2.5 gm/dL    A/G Ratio 1.7 g/dL    BUN/Creatinine Ratio 14.3 7.0 - 25.0    Anion Gap 11.0 5.0 - 15.0 mmol/L    eGFR 78.7 >60.0 mL/min/1.73   Results for orders placed or performed in visit on 01/12/22   CBC Auto Differential    Specimen: Blood   Result Value Ref Range    WBC 8.36 3.40 - 10.80 10*3/mm3    RBC 4.70 3.77 - 5.28 10*6/mm3    Hemoglobin 15.3 12.0 - 15.9 g/dL    Hematocrit 44.0 34.0 - 46.6 %    MCV 93.6 79.0 - 97.0 fL    MCH 32.6 26.6 - 33.0 pg    MCHC 34.8 31.5 - 35.7 g/dL    RDW 13.0 12.3 - 15.4 %    RDW-SD 44.0 37.0 - 54.0 fl    MPV 10.9 6.0 - 12.0 fL    Platelets 272 140 - 450 10*3/mm3    Neutrophil % 56.0 42.7 - 76.0 %    Lymphocyte % 32.9 19.6 - 45.3 %    Monocyte % 7.3 5.0 - 12.0 %    Eosinophil % 2.8 0.3 - 6.2 %    Basophil % 0.6 0.0 - 1.5 %    Immature Grans % 0.4 0.0 - 0.5 %    Neutrophils, Absolute  4.69 1.70 - 7.00 10*3/mm3    Lymphocytes, Absolute 2.75 0.70 - 3.10 10*3/mm3    Monocytes, Absolute 0.61 0.10 - 0.90 10*3/mm3    Eosinophils, Absolute 0.23 0.00 - 0.40 10*3/mm3    Basophils, Absolute 0.05 0.00 - 0.20 10*3/mm3    Immature Grans, Absolute 0.03 0.00 - 0.05 10*3/mm3    nRBC 0.0 0.0 - 0.2 /100 WBC   Vitamin D 25 hydroxy    Specimen: Blood   Result Value Ref Range    25 Hydroxy, Vitamin D 38.8 30.0 - 100.0 ng/ml   Magnesium    Specimen: Blood   Result Value Ref Range    Magnesium 2.3 1.6 - 2.4 mg/dL   Lipid panel    Specimen: Blood   Result Value Ref Range    Total Cholesterol 173 0 - 200 mg/dL    Triglycerides 230 (H) 0 - 150 mg/dL    HDL Cholesterol 41 40 - 60 mg/dL    LDL Cholesterol  93 0 - 100 mg/dL    VLDL Cholesterol 39 5 - 40 mg/dL    LDL/HDL Ratio 2.10    Comprehensive metabolic panel    Specimen: Blood   Result Value Ref Range    Glucose 120 (H) 65 - 99 mg/dL    BUN 16 8 - 23 mg/dL    Creatinine 0.95 0.57 - 1.00 mg/dL    Sodium 141 136 - 145 mmol/L    Potassium 4.5 3.5 - 5.2 mmol/L    Chloride 106 98 - 107 mmol/L    CO2 24.2 22.0 - 29.0 mmol/L    Calcium 9.4 8.6 - 10.5 mg/dL    Total Protein 7.0 6.0 - 8.5 g/dL    Albumin 4.70 3.50 - 5.20 g/dL    ALT (SGPT) 19 1 - 33 U/L    AST (SGOT) 19 1 - 32 U/L    Alkaline Phosphatase 69 39 - 117 U/L    Total Bilirubin 0.5 0.0 - 1.2 mg/dL    eGFR Non African Amer 60 (L) >60 mL/min/1.73    Globulin 2.3 gm/dL    A/G Ratio 2.0 g/dL    BUN/Creatinine Ratio 16.8 7.0 - 25.0    Anion Gap 10.8 5.0 - 15.0 mmol/L     *Note: Due to a large number of results and/or encounters for the requested time period, some results have not been displayed. A complete set of results can be found in Results Review.

## 2023-09-05 NOTE — PROGRESS NOTES
Chief Complaint   Patient presents with    Colon Cancer Screening       ENDO PROCEDURE ORDERED: COLON screen    Subjective    Argenis Blair is a 63 y.o. female. she is being seen for consultation today at the request of Artemio Caceres MD    History of Present Illness    Patient sent for evaluation for colonoscopy by Dr. Caceres who last saw the patient for a physical 06/09/2023.  Last seen with fatty liver 01/25/2018, F0/S2/N1.  Patient currently denies abdominal pain, heartburn, nausea, vomiting, dysphagia.  Bowels are moving without blood or mucus.  Does take MiraLAX as needed for constipation.  Weight is up 7 pounds since last visit.  Last EGD/colonoscopy 01/19/2018 showed esophagitis, gastritis, hemorrhoids, diverticulosis. Most recent available laboratories 05/01/2023 CMP showed a glucose 105, creatinine 1.19, GFR 51.5, otherwise normal. TSH was elevated at 4.97, A1c 6.9, negative urinalysis.      A/P:  Patient due for a screening colonoscopy, known diverticular disease.  Will plan further pending clinical course and the results of the above.      Thank you very much Dr. Caceres for involving us in the care of your patient.  Will keep you informed.             The following portions of the patient's history were reviewed and updated as appropriate:   Past Medical History:   Diagnosis Date    Allergy to bee sting     Anemia     Asthma     Colitis     Depression     Essential (primary) hypertension     Fibrocystic breast     Hashimoto's thyroiditis     Hypertensive left ventricular hypertrophy     Osteoporosis     Osteoporosis     PONV (postoperative nausea and vomiting)     Primary hyperparathyroidism     s/p parathyroidectomy       Vitamin D deficiency      Past Surgical History:   Procedure Laterality Date    BLADDER SURGERY  2004    CHOLECYSTECTOMY  2002    COLONOSCOPY N/A 1/19/2018    Procedure: COLONOSCOPY;  Surgeon: Samson Smith MD;  Location: [facility] ENDOSCOPY;  Service:     DILATATION AND CURETTAGE   1986    ENDOSCOPY N/A 2018    Procedure: ESOPHAGOGASTRODUODENOSCOPY--before follow up;  Surgeon: Samson Smith MD;  Location: [facility] ENDOSCOPY;  Service:     HYSTERECTOMY  2004    KNEE ARTHROSCOPY Right 3/10/2021    Procedure: arthroscopy of right knee with debridement of medial and lateral meniscus.;  Surgeon: Kofi Cleary MD;  Location: [facility] OR;  Service: Orthopedics;  Laterality: Right;    PARATHYROID GLAND SURGERY      TUBAL ABDOMINAL LIGATION      UPPER GASTROINTESTINAL ENDOSCOPY  2018     Family History   Problem Relation Age of Onset    Diabetes Mother     Heart disease Mother     Heart disease Father     Heart disease Brother     Cancer Other     Breast cancer Other     Hypertension Other     Lung cancer Other     Lung disease Other     Breast cancer Maternal Aunt     Breast cancer Paternal Aunt      OB History          2    Para   2    Term   2            AB        Living             SAB        IAB        Ectopic        Molar        Multiple        Live Births                  Allergies   Allergen Reactions    Bee Venom Anaphylaxis    Percocet [Oxycodone-Acetaminophen] Delirium    Claritin [Loratadine]     Keflex [Cephalexin] Nausea And Vomiting    Sulfa Antibiotics      Social History     Socioeconomic History    Marital status:    Tobacco Use    Smoking status: Never    Smokeless tobacco: Never   Vaping Use    Vaping Use: Never used   Substance and Sexual Activity    Alcohol use: No    Drug use: No    Sexual activity: Defer     Current Medications:  Prior to Admission medications    Medication Sig Start Date End Date Taking? Authorizing Provider   cyclobenzaprine (FLEXERIL) 10 MG tablet Take 1 tablet by mouth 3 (Three) Times a Day As Needed for Muscle Spasms. 23  Yes Artemio Caceres MD   denosumab (PROLIA) 60 MG/ML solution prefilled syringe syringe Inject 60 mg (1 syringe) into the appropriate area every 6 months. 23  Yes Artemio Caceres  MD Willy   diclofenac (VOLTAREN) 50 MG EC tablet Take 1 tablet by mouth 2 (Two) Times a Day As Needed (Shoulder pain and/or knee pain). 5/1/23  Yes Artemio Caceres MD   Diclofenac Sodium (Voltaren) 1 % gel gel Apply 4 g topically to the appropriate area as directed 4 (Four) Times a Day As Needed (Mild pain). 8/22/23  Yes Artemio Caceres MD   famciclovir (Famvir) 500 MG tablet Take 1 tablet by mouth 2 (Two) Times a Day As Needed (symptom recurrence). 4/6/23  Yes Artemio Caceres MD   fluocinonide (LIDEX) 0.05 % cream Apply 1 application topically to the appropriate area as directed 2 (Two) Times a Day. 5/2/23  Yes Artemio Caceres MD   fluticasone (Flonase) 50 MCG/ACT nasal spray 2 sprays into the nostril(s) as directed by provider Daily. 1/13/22  Yes Sean Morrison MD   GARLIC PO Take  by mouth.   Yes Emily Alvarez MD   losartan-hydrochlorothiazide (Hyzaar) 50-12.5 MG per tablet Take 1 tablet by mouth Daily. 4/6/23  Yes Artemio Caceres MD   magnesium oxide (MAG-OX) 400 MG tablet Take 1 tablet by mouth Daily. 4/6/23  Yes Atremio Caceres MD   metroNIDAZOLE (NORITRATE) 1 % cream Apply 1 application  topically to the appropriate area as directed Daily. 7/28/23  Yes Artemio Caceres MD   ondansetron (ZOFRAN) 4 MG tablet Take 1 tablet by mouth Every 8 (Eight) Hours As Needed for Nausea or Vomiting. 1/20/21  Yes Juliann Segovia MD   polyethylene glycol (MIRALAX) packet Take 17 g by mouth Daily. 2/14/19  Yes Melisa Valente MD   Semaglutide,0.25 or 0.5MG/DOS, (OZEMPIC) 2 MG/1.5ML solution pen-injector Inject 0.25 mg under the skin into the appropriate area as directed 1 (One) Time Per Week. 8/25/23  Yes Artemio Caceres MD   Synthroid 200 MCG tablet Take 1 tablet by mouth Daily. 5/10/23  Yes Artemio Caceres MD   valACYclovir (Valtrex) 500 MG tablet Take 1 tablet by mouth Daily. 8/1/23  Yes Artemio Caceres MD   vitamin D3  "(Vitamin D) 125 MCG (5000 UT) capsule capsule Take 1 capsule by mouth Daily. 4/6/23  Yes Artemio Caceres MD     Review of Systems  Review of Systems   Constitutional:  Negative for unexpected weight change.   HENT:  Negative for trouble swallowing.    Gastrointestinal:  Positive for abdominal pain.        Objective    /90 (BP Location: Right arm, Patient Position: Sitting, Cuff Size: Adult)   Pulse 103   Ht 152.4 cm (60\")   Wt 103 kg (228 lb)   SpO2 96%   BMI 44.53 kg/m²   Physical Exam  Vitals and nursing note reviewed.   Constitutional:       General: She is not in acute distress.     Appearance: She is well-developed. She is obese.   HENT:      Head: Normocephalic and atraumatic.   Eyes:      Pupils: Pupils are equal, round, and reactive to light.   Cardiovascular:      Rate and Rhythm: Normal rate and regular rhythm.      Heart sounds: Normal heart sounds.   Pulmonary:      Effort: Pulmonary effort is normal.      Breath sounds: Normal breath sounds.   Abdominal:      General: Bowel sounds are normal. There is no distension or abdominal bruit.      Palpations: Abdomen is soft. Abdomen is not rigid. There is no shifting dullness or mass.      Tenderness: There is abdominal tenderness. There is no guarding or rebound.      Hernia: No hernia is present. There is no hernia in the ventral area.   Musculoskeletal:         General: Normal range of motion.      Cervical back: Normal range of motion.   Skin:     General: Skin is warm and dry.   Neurological:      Mental Status: She is alert and oriented to person, place, and time.   Psychiatric:         Behavior: Behavior normal.         Thought Content: Thought content normal.         Judgment: Judgment normal.     Assessment & Plan      1. Encounter for screening for malignant neoplasm of colon    2. History of colon polyps    .   Diagnoses and all orders for this visit:    1. Encounter for screening for malignant neoplasm of colon (Primary)  -     " Case Request; Standing  -     dextrose 5 % and sodium chloride 0.45 % infusion  -     Case Request    2. History of colon polyps  -     Case Request; Standing  -     dextrose 5 % and sodium chloride 0.45 % infusion  -     Case Request    Other orders  -     Obtain Informed Consent; Standing  -     Follow Anesthesia Guidelines / Protocol; Future  -     Obtain Informed Consent; Future  -     POC Glucose Once; Standing  -     sodium-potassium-magnesium sulfates (Suprep Bowel Prep Kit) 17.5-3.13-1.6 GM/177ML solution oral solution; Take 1 bottle by mouth Every 12 (Twelve) Hours.  Dispense: 354 mL; Refill: 0        Orders placed during this encounter include:  Orders Placed This Encounter   Procedures    Obtain Informed Consent     Standing Status:   Future     Order Specific Question:   Informed Consent Given For     Answer:   COLONOSCOPY       Medications prescribed:  New Medications Ordered This Visit   Medications    sodium-potassium-magnesium sulfates (Suprep Bowel Prep Kit) 17.5-3.13-1.6 GM/177ML solution oral solution     Sig: Take 1 bottle by mouth Every 12 (Twelve) Hours.     Dispense:  354 mL     Refill:  0       Requested Prescriptions     Signed Prescriptions Disp Refills    sodium-potassium-magnesium sulfates (Suprep Bowel Prep Kit) 17.5-3.13-1.6 GM/177ML solution oral solution 354 mL 0     Sig: Take 1 bottle by mouth Every 12 (Twelve) Hours.       Review and/or summary of lab tests, radiology, procedures, medications. Review and summary of old records and obtaining of history. The risks and benefits of my recommendations, as well as other treatment options were discussed with the patient today. Questions were answered.    Follow-up: No follow-ups on file.     COLONOSCOPY (N/A)      This document has been electronically signed by Viraj Polanco PA-C on September 18, 2023 19:39 CDT      Results for orders placed or performed in visit on 09/12/23   Rickettsia Species DNA, Real-Time PCR    Specimen: Blood    Result Value Ref Range    Rickettsia rickettsii DNA, RT Not Detected    Lyme Disease Total Antibody With Reflex to Immunoassay    Specimen: Blood   Result Value Ref Range    Lyme Total Antibody EIA Negative Negative   TSH    Specimen: Blood   Result Value Ref Range    TSH 1.000 0.270 - 4.200 uIU/mL   Hemoglobin A1c    Specimen: Blood   Result Value Ref Range    Hemoglobin A1C 6.20 (H) 4.80 - 5.60 %   Results for orders placed or performed in visit on 05/01/23   TSH    Specimen: Blood   Result Value Ref Range    TSH 4.970 (H) 0.270 - 4.200 uIU/mL   Comprehensive metabolic panel    Specimen: Blood   Result Value Ref Range    Glucose 105 (H) 65 - 99 mg/dL    BUN 16 8 - 23 mg/dL    Creatinine 1.19 (H) 0.57 - 1.00 mg/dL    Sodium 143 136 - 145 mmol/L    Potassium 4.1 3.5 - 5.2 mmol/L    Chloride 102 98 - 107 mmol/L    CO2 23.5 22.0 - 29.0 mmol/L    Calcium 9.5 8.6 - 10.5 mg/dL    Total Protein 7.2 6.0 - 8.5 g/dL    Albumin 4.7 3.5 - 5.2 g/dL    ALT (SGPT) 17 1 - 33 U/L    AST (SGOT) 23 1 - 32 U/L    Alkaline Phosphatase 73 39 - 117 U/L    Total Bilirubin 0.9 0.0 - 1.2 mg/dL    Globulin 2.5 gm/dL    A/G Ratio 1.9 g/dL    BUN/Creatinine Ratio 13.4 7.0 - 25.0    Anion Gap 17.5 (H) 5.0 - 15.0 mmol/L    eGFR 51.5 (L) >60.0 mL/min/1.73   Results for orders placed or performed in visit on 05/01/23   POCT urinalysis dipstick, manual    Specimen: Urine   Result Value Ref Range    Color Yellow Yellow, Straw, Dark Yellow, Renetta    Clarity, UA Clear Clear    Glucose, UA Negative Negative mg/dL    Bilirubin Negative Negative    Ketones, UA Negative Negative    Specific Gravity  1.030 1.005 - 1.030    Blood, UA Negative Negative    pH, Urine 5.5 5.0 - 8.0    Protein, POC Negative Negative mg/dL    Urobilinogen, UA 0.2 E.U./dL Normal, 0.2 E.U./dL    Leukocytes Negative Negative    Nitrite, UA Negative Negative   Hemoglobin A1c    Specimen: Blood   Result Value Ref Range    Hemoglobin A1C 6.30 (H) 4.80 - 5.60 %   Results for orders  placed or performed in visit on 10/28/22   QuantiFERON TB Gold    Specimen: Blood   Result Value Ref Range    QuantiFERON Incubation Incubation performed.     QuantiFERON Criteria Comment     QUANTIFERON TB1 AG VALUE 0.03 IU/mL    QUANTIFERON TB2 AG VALUE 0.03 IU/mL    QuantiFERON Nil Value 0.02 IU/mL    QuantiFERON Mitogen Value >10.00 IU/mL    QUANTIFERON-TB GOLD PLUS Negative Negative   Phosphorus    Specimen: Blood   Result Value Ref Range    Phosphorus 4.4 2.5 - 4.5 mg/dL   Magnesium    Specimen: Blood   Result Value Ref Range    Magnesium 1.8 1.6 - 2.4 mg/dL   Comprehensive metabolic panel    Specimen: Blood   Result Value Ref Range    Glucose 121 (H) 65 - 99 mg/dL    BUN 12 8 - 23 mg/dL    Creatinine 0.84 0.57 - 1.00 mg/dL    Sodium 140 136 - 145 mmol/L    Potassium 3.9 3.5 - 5.2 mmol/L    Chloride 102 98 - 107 mmol/L    CO2 27.0 22.0 - 29.0 mmol/L    Calcium 9.4 8.6 - 10.5 mg/dL    Total Protein 6.8 6.0 - 8.5 g/dL    Albumin 4.30 3.50 - 5.20 g/dL    ALT (SGPT) 14 1 - 33 U/L    AST (SGOT) 15 1 - 32 U/L    Alkaline Phosphatase 56 39 - 117 U/L    Total Bilirubin 0.8 0.0 - 1.2 mg/dL    Globulin 2.5 gm/dL    A/G Ratio 1.7 g/dL    BUN/Creatinine Ratio 14.3 7.0 - 25.0    Anion Gap 11.0 5.0 - 15.0 mmol/L    eGFR 78.7 >60.0 mL/min/1.73   Results for orders placed or performed in visit on 01/12/22   CBC Auto Differential    Specimen: Blood   Result Value Ref Range    WBC 8.36 3.40 - 10.80 10*3/mm3    RBC 4.70 3.77 - 5.28 10*6/mm3    Hemoglobin 15.3 12.0 - 15.9 g/dL    Hematocrit 44.0 34.0 - 46.6 %    MCV 93.6 79.0 - 97.0 fL    MCH 32.6 26.6 - 33.0 pg    MCHC 34.8 31.5 - 35.7 g/dL    RDW 13.0 12.3 - 15.4 %    RDW-SD 44.0 37.0 - 54.0 fl    MPV 10.9 6.0 - 12.0 fL    Platelets 272 140 - 450 10*3/mm3    Neutrophil % 56.0 42.7 - 76.0 %    Lymphocyte % 32.9 19.6 - 45.3 %    Monocyte % 7.3 5.0 - 12.0 %    Eosinophil % 2.8 0.3 - 6.2 %    Basophil % 0.6 0.0 - 1.5 %    Immature Grans % 0.4 0.0 - 0.5 %    Neutrophils, Absolute  4.69 1.70 - 7.00 10*3/mm3    Lymphocytes, Absolute 2.75 0.70 - 3.10 10*3/mm3    Monocytes, Absolute 0.61 0.10 - 0.90 10*3/mm3    Eosinophils, Absolute 0.23 0.00 - 0.40 10*3/mm3    Basophils, Absolute 0.05 0.00 - 0.20 10*3/mm3    Immature Grans, Absolute 0.03 0.00 - 0.05 10*3/mm3    nRBC 0.0 0.0 - 0.2 /100 WBC   Vitamin D 25 hydroxy    Specimen: Blood   Result Value Ref Range    25 Hydroxy, Vitamin D 38.8 30.0 - 100.0 ng/ml   Magnesium    Specimen: Blood   Result Value Ref Range    Magnesium 2.3 1.6 - 2.4 mg/dL   Lipid panel    Specimen: Blood   Result Value Ref Range    Total Cholesterol 173 0 - 200 mg/dL    Triglycerides 230 (H) 0 - 150 mg/dL    HDL Cholesterol 41 40 - 60 mg/dL    LDL Cholesterol  93 0 - 100 mg/dL    VLDL Cholesterol 39 5 - 40 mg/dL    LDL/HDL Ratio 2.10    Comprehensive metabolic panel    Specimen: Blood   Result Value Ref Range    Glucose 120 (H) 65 - 99 mg/dL    BUN 16 8 - 23 mg/dL    Creatinine 0.95 0.57 - 1.00 mg/dL    Sodium 141 136 - 145 mmol/L    Potassium 4.5 3.5 - 5.2 mmol/L    Chloride 106 98 - 107 mmol/L    CO2 24.2 22.0 - 29.0 mmol/L    Calcium 9.4 8.6 - 10.5 mg/dL    Total Protein 7.0 6.0 - 8.5 g/dL    Albumin 4.70 3.50 - 5.20 g/dL    ALT (SGPT) 19 1 - 33 U/L    AST (SGOT) 19 1 - 32 U/L    Alkaline Phosphatase 69 39 - 117 U/L    Total Bilirubin 0.5 0.0 - 1.2 mg/dL    eGFR Non African Amer 60 (L) >60 mL/min/1.73    Globulin 2.3 gm/dL    A/G Ratio 2.0 g/dL    BUN/Creatinine Ratio 16.8 7.0 - 25.0    Anion Gap 10.8 5.0 - 15.0 mmol/L     *Note: Due to a large number of results and/or encounters for the requested time period, some results have not been displayed. A complete set of results can be found in Results Review.

## 2023-09-08 DIAGNOSIS — K12.1 ORAL ULCER: ICD-10-CM

## 2023-09-08 DIAGNOSIS — M79.7 FIBROMYALGIA: ICD-10-CM

## 2023-09-08 DIAGNOSIS — E06.3 HYPOTHYROIDISM DUE TO HASHIMOTO'S THYROIDITIS: ICD-10-CM

## 2023-09-08 DIAGNOSIS — B00.1 FEVER BLISTER: ICD-10-CM

## 2023-09-08 DIAGNOSIS — E03.8 HYPOTHYROIDISM DUE TO HASHIMOTO'S THYROIDITIS: ICD-10-CM

## 2023-09-08 RX ORDER — CYCLOBENZAPRINE HCL 10 MG
10 TABLET ORAL 3 TIMES DAILY PRN
Qty: 90 TABLET | Refills: 0 | Status: SHIPPED | OUTPATIENT
Start: 2023-09-08

## 2023-09-08 RX ORDER — VALACYCLOVIR HYDROCHLORIDE 500 MG/1
500 TABLET, FILM COATED ORAL DAILY
Qty: 30 TABLET | Refills: 3 | Status: SHIPPED | OUTPATIENT
Start: 2023-09-08

## 2023-09-08 RX ORDER — LEVOTHYROXINE SODIUM 200 MCG
200 TABLET ORAL DAILY
Qty: 30 TABLET | Refills: 3 | Status: SHIPPED | OUTPATIENT
Start: 2023-09-08

## 2023-09-12 ENCOUNTER — OFFICE VISIT (OUTPATIENT)
Dept: FAMILY MEDICINE CLINIC | Facility: CLINIC | Age: 63
End: 2023-09-12
Payer: COMMERCIAL

## 2023-09-12 ENCOUNTER — LAB (OUTPATIENT)
Dept: LAB | Facility: HOSPITAL | Age: 63
End: 2023-09-12
Payer: COMMERCIAL

## 2023-09-12 VITALS
HEIGHT: 60 IN | SYSTOLIC BLOOD PRESSURE: 128 MMHG | TEMPERATURE: 97.2 F | BODY MASS INDEX: 44.45 KG/M2 | DIASTOLIC BLOOD PRESSURE: 76 MMHG | HEART RATE: 93 BPM | OXYGEN SATURATION: 98 % | WEIGHT: 226.4 LBS

## 2023-09-12 DIAGNOSIS — E66.01 CLASS 3 SEVERE OBESITY DUE TO EXCESS CALORIES WITH BODY MASS INDEX (BMI) OF 40.0 TO 44.9 IN ADULT, UNSPECIFIED WHETHER SERIOUS COMORBIDITY PRESENT: ICD-10-CM

## 2023-09-12 DIAGNOSIS — N64.52 NIPPLE DISCHARGE: Primary | ICD-10-CM

## 2023-09-12 DIAGNOSIS — R21 RASH: ICD-10-CM

## 2023-09-12 DIAGNOSIS — E03.8 HYPOTHYROIDISM DUE TO HASHIMOTO'S THYROIDITIS: ICD-10-CM

## 2023-09-12 DIAGNOSIS — R73.03 PREDIABETES: ICD-10-CM

## 2023-09-12 DIAGNOSIS — E06.3 HYPOTHYROIDISM DUE TO HASHIMOTO'S THYROIDITIS: ICD-10-CM

## 2023-09-12 DIAGNOSIS — L81.9 HYPERPIGMENTED SKIN LESION: ICD-10-CM

## 2023-09-12 LAB
HBA1C MFR BLD: 6.2 % (ref 4.8–5.6)
TSH SERPL DL<=0.05 MIU/L-ACNC: 1 UIU/ML (ref 0.27–4.2)

## 2023-09-12 PROCEDURE — 84443 ASSAY THYROID STIM HORMONE: CPT | Performed by: STUDENT IN AN ORGANIZED HEALTH CARE EDUCATION/TRAINING PROGRAM

## 2023-09-12 PROCEDURE — 87798 DETECT AGENT NOS DNA AMP: CPT | Performed by: STUDENT IN AN ORGANIZED HEALTH CARE EDUCATION/TRAINING PROGRAM

## 2023-09-12 PROCEDURE — 87468 ANAPLSMA PHGCYTOPHLM AMP PRB: CPT | Performed by: STUDENT IN AN ORGANIZED HEALTH CARE EDUCATION/TRAINING PROGRAM

## 2023-09-12 PROCEDURE — 87484 EHRLICHA CHAFFEENSIS AMP PRB: CPT | Performed by: STUDENT IN AN ORGANIZED HEALTH CARE EDUCATION/TRAINING PROGRAM

## 2023-09-12 PROCEDURE — 86618 LYME DISEASE ANTIBODY: CPT | Performed by: STUDENT IN AN ORGANIZED HEALTH CARE EDUCATION/TRAINING PROGRAM

## 2023-09-12 PROCEDURE — 36415 COLL VENOUS BLD VENIPUNCTURE: CPT | Performed by: STUDENT IN AN ORGANIZED HEALTH CARE EDUCATION/TRAINING PROGRAM

## 2023-09-12 PROCEDURE — 83036 HEMOGLOBIN GLYCOSYLATED A1C: CPT | Performed by: STUDENT IN AN ORGANIZED HEALTH CARE EDUCATION/TRAINING PROGRAM

## 2023-09-12 RX ORDER — SEMAGLUTIDE 0.25 MG/.5ML
0.25 INJECTION, SOLUTION SUBCUTANEOUS WEEKLY
Qty: 2 ML | Refills: 2 | Status: SHIPPED | OUTPATIENT
Start: 2023-09-12

## 2023-09-12 NOTE — PROGRESS NOTES
Family Medicine Residency  Artemio Caceres MD    Subjective:     Argenis Blair is a 63 y.o. female who presents for breast discharge.    Having new onset breast discharge several times the last couple of weeks. Discharge is greenish with crusting around nipple. Never had before. Has fibrocystic disease of the breast previously. Denies any blood discharge. Denies any skin changes or dimpling.   Has significant family history of cancer. Has 2 aunts with breast and other family members with cancer. Denies any stimulation of the breast. Denies any pain. Denies any new lumps or changes to fibrocystic disease.   Only seems to happen in the morning or overnight as patient wakes up with discharge on her shirt. Denies any redness or fever.     Past Medical Hx:  Past Medical History:   Diagnosis Date    Allergy to bee sting     Anemia     Asthma     Colitis     Depression     Essential (primary) hypertension     Fibrocystic breast     Hashimoto's thyroiditis     Hypertensive left ventricular hypertrophy     Osteoporosis     Osteoporosis     PONV (postoperative nausea and vomiting)     Primary hyperparathyroidism     s/p parathyroidectomy       Vitamin D deficiency        Past Surgical Hx:  Past Surgical History:   Procedure Laterality Date    BLADDER SURGERY  2004    CHOLECYSTECTOMY  2002    COLONOSCOPY N/A 1/19/2018    Procedure: COLONOSCOPY;  Surgeon: Samson Smith MD;  Location: [facility] ENDOSCOPY;  Service:     DILATATION AND CURETTAGE  1986    ENDOSCOPY N/A 1/19/2018    Procedure: ESOPHAGOGASTRODUODENOSCOPY--before follow up;  Surgeon: Samson Smith MD;  Location: [facility] ENDOSCOPY;  Service:     HYSTERECTOMY  2004    KNEE ARTHROSCOPY Right 3/10/2021    Procedure: arthroscopy of right knee with debridement of medial and lateral meniscus.;  Surgeon: Kofi Cleary MD;  Location: [facility] OR;  Service: Orthopedics;  Laterality: Right;    PARATHYROID GLAND SURGERY      TUBAL ABDOMINAL LIGATION  1988    UPPER  GASTROINTESTINAL ENDOSCOPY  01/19/2018       Current Meds:    Current Outpatient Medications:     cyclobenzaprine (FLEXERIL) 10 MG tablet, Take 1 tablet by mouth 3 (Three) Times a Day As Needed for Muscle Spasms., Disp: 90 tablet, Rfl: 0    diclofenac (VOLTAREN) 50 MG EC tablet, Take 1 tablet by mouth 2 (Two) Times a Day As Needed (Shoulder pain and/or knee pain)., Disp: 60 tablet, Rfl: 3    Diclofenac Sodium (Voltaren) 1 % gel gel, Apply 4 g topically to the appropriate area as directed 4 (Four) Times a Day As Needed (Mild pain)., Disp: 100 g, Rfl: 3    famciclovir (Famvir) 500 MG tablet, Take 1 tablet by mouth 2 (Two) Times a Day As Needed (symptom recurrence)., Disp: 20 tablet, Rfl: 0    fluocinonide (LIDEX) 0.05 % cream, Apply 1 application topically to the appropriate area as directed 2 (Two) Times a Day., Disp: 60 g, Rfl: 2    fluticasone (Flonase) 50 MCG/ACT nasal spray, 2 sprays into the nostril(s) as directed by provider Daily., Disp: 9.9 mL, Rfl: 3    GARLIC PO, Take  by mouth., Disp: , Rfl:     losartan-hydrochlorothiazide (Hyzaar) 50-12.5 MG per tablet, Take 1 tablet by mouth Daily., Disp: 90 tablet, Rfl: 3    magnesium oxide (MAG-OX) 400 MG tablet, Take 1 tablet by mouth Daily., Disp: 30 tablet, Rfl: 3    ondansetron (ZOFRAN) 4 MG tablet, Take 1 tablet by mouth Every 8 (Eight) Hours As Needed for Nausea or Vomiting., Disp: 10 tablet, Rfl: 0    polyethylene glycol (MIRALAX) packet, Take 17 g by mouth Daily., Disp: 30 each, Rfl: 0    sodium-potassium-magnesium sulfates (Suprep Bowel Prep Kit) 17.5-3.13-1.6 GM/177ML solution oral solution, Take 1 bottle by mouth Every 12 (Twelve) Hours., Disp: 354 mL, Rfl: 0    Synthroid 200 MCG tablet, Take 1 tablet by mouth Daily., Disp: 30 tablet, Rfl: 3    valACYclovir (Valtrex) 500 MG tablet, Take 1 tablet by mouth Daily., Disp: 30 tablet, Rfl: 3    vitamin D3 (Vitamin D) 125 MCG (5000 UT) capsule capsule, Take 1 capsule by mouth Daily., Disp: 30 capsule, Rfl: 11     "denosumab (PROLIA) 60 MG/ML solution prefilled syringe syringe, Inject 60 mg (1 syringe) into the appropriate area every 6 months. (Patient not taking: Reported on 9/12/2023), Disp: 1 mL, Rfl: 1    metroNIDAZOLE (NORITRATE) 1 % cream, Apply 1 application  topically to the appropriate area as directed Daily. (Patient not taking: Reported on 9/12/2023), Disp: 60 g, Rfl: 2    Semaglutide-Weight Management (Wegovy) 0.25 MG/0.5ML solution auto-injector, Inject 0.25 mg under the skin into the appropriate area as directed 1 (One) Time Per Week., Disp: 2 mL, Rfl: 2    Allergies:  Allergies   Allergen Reactions    Bee Venom Anaphylaxis    Percocet [Oxycodone-Acetaminophen] Delirium    Claritin [Loratadine]     Keflex [Cephalexin] Nausea And Vomiting    Sulfa Antibiotics        Family Hx:  Family History   Problem Relation Age of Onset    Diabetes Mother     Heart disease Mother     Heart disease Father     Heart disease Brother     Cancer Other     Breast cancer Other     Hypertension Other     Lung cancer Other     Lung disease Other     Breast cancer Maternal Aunt     Breast cancer Paternal Aunt         Social History:  Social History     Socioeconomic History    Marital status:    Tobacco Use    Smoking status: Never    Smokeless tobacco: Never   Vaping Use    Vaping Use: Never used   Substance and Sexual Activity    Alcohol use: No    Drug use: No    Sexual activity: Defer       Review of Systems  Review of Systems   Constitutional:  Negative for fever.        Nipple discharge   Respiratory:  Negative for shortness of breath.    Cardiovascular:  Negative for chest pain.   Gastrointestinal:  Negative for abdominal pain.   Skin:  Negative for color change and rash.     Objective:     /76   Pulse 93   Temp 97.2 °F (36.2 °C)   Ht 152.4 cm (60\")   Wt 103 kg (226 lb 6.4 oz)   SpO2 98%   BMI 44.22 kg/m²   Physical Exam  Constitutional:       General: She is not in acute distress.  Cardiovascular:      Rate " and Rhythm: Normal rate and regular rhythm.   Pulmonary:      Effort: Pulmonary effort is normal. No respiratory distress.   Chest:   Breasts:     Breasts are symmetrical.      Left: Nipple discharge present. No skin change.      Comments: Slight inversion of left nipple  Multiple masses felt in the left breast around area surrounding the areola  Yellow, serous discharge expressed from left nipple    1 inch irregular hyperpigmented skin lesion located adjacent to areola on left breast  Neurological:      Mental Status: She is alert.        Assessment/Plan:     Diagnoses and all orders for this visit:    1. Nipple discharge (Primary)  -     US breast left complete    2. Prediabetes  -     Hemoglobin A1c  -     Semaglutide-Weight Management (Wegovy) 0.25 MG/0.5ML solution auto-injector; Inject 0.25 mg under the skin into the appropriate area as directed 1 (One) Time Per Week.  Dispense: 2 mL; Refill: 2    3. Hypothyroidism due to Hashimoto's thyroiditis  -     TSH    4. Rash  -     Tick Panel    5. Hyperpigmented skin lesion  -     Ambulatory Referral to Dermatology    6. Class 3 severe obesity due to excess calories with body mass index (BMI) of 40.0 to 44.9 in adult, unspecified whether serious comorbidity present  -     Semaglutide-Weight Management (Wegovy) 0.25 MG/0.5ML solution auto-injector; Inject 0.25 mg under the skin into the appropriate area as directed 1 (One) Time Per Week.  Dispense: 2 mL; Refill: 2      Nipple discharge - this is an acute issue. We will obtain breast ultrasound for further evaluation of nipple discharge.  Differential includes papilloma versus cancer versus fibrocystic disease versus duct ectasia.  Most likely this is due to a clogged duct as discharge is green and serous in appearance.    Hyperpigmented skin lesion -chronic, concern for potential melanoma due to hyperpigmentation and irregular borders, have referred to dermatology for biopsy and evaluation.    Obesity - chronic, will  start Wegovy at this time as patient does not have diagnosis of diabetes so Ozempic or Mounjaro will likely not be covered.  We will repeat hemoglobin A1c to reevaluate for diabetes.  If patient is diagnosed can consider Ozempic or Mounjaro at that time.    Hypothyroidism -chronic, stable.  Will reevaluate with thyroid levels at this time and make therapy adjustments if needed.     Follow-up:     Return in about 4 weeks (around 10/10/2023) for Recheck Weight loss, nipple discharge.    RISK SCORE: 3      This document has been electronically signed by Artemio Caceres MD on September 15, 2023 16:31 CDT

## 2023-09-14 LAB — B BURGDOR IGG+IGM SER QL IA: NEGATIVE

## 2023-09-17 LAB — RICKETTSIA RICKETTSII DNA, RT: NOT DETECTED

## 2023-09-18 ENCOUNTER — TELEPHONE (OUTPATIENT)
Dept: FAMILY MEDICINE CLINIC | Facility: CLINIC | Age: 63
End: 2023-09-18
Payer: COMMERCIAL

## 2023-09-19 LAB
A PHAGOCYTOPH DNA BLD QL NAA+PROBE: NEGATIVE
E CHAFFEENSIS DNA BLD QL NAA+PROBE: NEGATIVE

## 2023-09-27 ENCOUNTER — ANESTHESIA (OUTPATIENT)
Dept: GASTROENTEROLOGY | Facility: HOSPITAL | Age: 63
End: 2023-09-27
Payer: COMMERCIAL

## 2023-09-27 ENCOUNTER — ANESTHESIA EVENT (OUTPATIENT)
Dept: GASTROENTEROLOGY | Facility: HOSPITAL | Age: 63
End: 2023-09-27
Payer: COMMERCIAL

## 2023-09-27 ENCOUNTER — HOSPITAL ENCOUNTER (OUTPATIENT)
Facility: HOSPITAL | Age: 63
Setting detail: HOSPITAL OUTPATIENT SURGERY
Discharge: HOME | End: 2023-09-27
Attending: INTERNAL MEDICINE | Admitting: INTERNAL MEDICINE
Payer: COMMERCIAL

## 2023-09-27 VITALS
RESPIRATION RATE: 18 BRPM | SYSTOLIC BLOOD PRESSURE: 163 MMHG | HEIGHT: 60 IN | WEIGHT: 226 LBS | DIASTOLIC BLOOD PRESSURE: 77 MMHG | TEMPERATURE: 97.6 F | BODY MASS INDEX: 44.37 KG/M2 | HEART RATE: 95 BPM | OXYGEN SATURATION: 97 %

## 2023-09-27 DIAGNOSIS — Z86.010 HISTORY OF COLON POLYPS: ICD-10-CM

## 2023-09-27 DIAGNOSIS — Z12.11 ENCOUNTER FOR SCREENING FOR MALIGNANT NEOPLASM OF COLON: ICD-10-CM

## 2023-09-27 PROCEDURE — 45378 DIAGNOSTIC COLONOSCOPY: CPT | Performed by: INTERNAL MEDICINE

## 2023-09-27 PROCEDURE — 25010000002 PROPOFOL 10 MG/ML EMULSION: Performed by: NURSE ANESTHETIST, CERTIFIED REGISTERED

## 2023-09-27 RX ORDER — LIDOCAINE HYDROCHLORIDE 20 MG/ML
INJECTION, SOLUTION EPIDURAL; INFILTRATION; INTRACAUDAL; PERINEURAL AS NEEDED
Status: DISCONTINUED | OUTPATIENT
Start: 2023-09-27 | End: 2023-09-27 | Stop reason: SURG

## 2023-09-27 RX ORDER — MIDAZOLAM HYDROCHLORIDE 1 MG/ML
INJECTION INTRAMUSCULAR; INTRAVENOUS AS NEEDED
Status: DISCONTINUED | OUTPATIENT
Start: 2023-09-27 | End: 2023-09-27

## 2023-09-27 RX ORDER — PROPOFOL 10 MG/ML
VIAL (ML) INTRAVENOUS AS NEEDED
Status: DISCONTINUED | OUTPATIENT
Start: 2023-09-27 | End: 2023-09-27 | Stop reason: SURG

## 2023-09-27 RX ORDER — DEXTROSE AND SODIUM CHLORIDE 5; .45 G/100ML; G/100ML
30 INJECTION, SOLUTION INTRAVENOUS CONTINUOUS PRN
Status: DISCONTINUED | OUTPATIENT
Start: 2023-09-27 | End: 2023-09-27 | Stop reason: HOSPADM

## 2023-09-27 RX ADMIN — PROPOFOL 100 MG: 10 INJECTION, EMULSION INTRAVENOUS at 16:23

## 2023-09-27 RX ADMIN — PROPOFOL 30 MG: 10 INJECTION, EMULSION INTRAVENOUS at 16:27

## 2023-09-27 RX ADMIN — LIDOCAINE HYDROCHLORIDE 100 MG: 20 INJECTION, SOLUTION EPIDURAL; INFILTRATION; INTRACAUDAL; PERINEURAL at 16:23

## 2023-09-27 RX ADMIN — PROPOFOL 20 MG: 10 INJECTION, EMULSION INTRAVENOUS at 16:28

## 2023-09-27 RX ADMIN — DEXTROSE AND SODIUM CHLORIDE 30 ML/HR: 5; 450 INJECTION, SOLUTION INTRAVENOUS at 15:28

## 2023-09-27 RX ADMIN — PROPOFOL 50 MG: 10 INJECTION, EMULSION INTRAVENOUS at 16:25

## 2023-09-27 RX ADMIN — PROPOFOL 30 MG: 10 INJECTION, EMULSION INTRAVENOUS at 16:29

## 2023-09-27 NOTE — ANESTHESIA PREPROCEDURE EVALUATION
Anesthesia Evaluation     Patient summary reviewed and Nursing notes reviewed   history of anesthetic complications:  PONV  NPO Solid Status: > 8 hours  NPO Liquid Status: > 4 hours           Airway   Mallampati: III  TM distance: <3 FB  Anterior, Possible difficult intubation and Small opening  Dental    (+) poor dentition    Pulmonary - normal exam   (-) asthma, sleep apnea, rhonchi, decreased breath sounds, wheezes, not a smoker  Cardiovascular   Exercise tolerance: good (4-7 METS)    ECG reviewed  Rhythm: regular  Rate: normal    (+) hypertension well controlled less than 2 medications  (-) pacemaker, past MI, dysrhythmias, murmur, cardiac stents, DVT      Neuro/Psych  (+) psychiatric history  (-) seizures, TIA, CVA  GI/Hepatic/Renal/Endo    (+) obesity, morbid obesity, renal disease, thyroid problem hypothyroidism  (-) GERD, GI bleed    Musculoskeletal     Abdominal   (+) obese   Substance History - negative use     OB/GYN negative ob/gyn ROS   (-)  Pregnant        Other   arthritis,                     Anesthesia Plan    ASA 3     general     intravenous induction     Anesthetic plan, risks, benefits, and alternatives have been provided, discussed and informed consent has been obtained with: patient.  Pre-procedure education provided

## 2023-09-27 NOTE — ANESTHESIA POSTPROCEDURE EVALUATION
Patient: Argenis Blair    Procedure Summary       Date: 09/27/23 Room / Location: [facility] ENDOSCOPY 1 / [facility] ENDOSCOPY    Anesthesia Start: 1620 Anesthesia Stop: 1632    Procedure: COLONOSCOPY Diagnosis:       Encounter for screening for malignant neoplasm of colon      History of colon polyps      (Encounter for screening for malignant neoplasm of colon [Z12.11])      (History of colon polyps [Z86.010])    Surgeons: Samson Smith MD Provider: Dave Schmitt CRNA    Anesthesia Type: general ASA Status: 3            Anesthesia Type: general    Vitals  No vitals data found for the desired time range.          Post Anesthesia Care and Evaluation    Patient location during evaluation: PHASE II  Patient participation: complete - patient participated  Level of consciousness: sleepy but conscious  Pain score: 0  Pain management: adequate    Airway patency: patent  Anesthetic complications: No anesthetic complications  PONV Status: none  Cardiovascular status: acceptable  Respiratory status: acceptable  Hydration status: acceptable    Comments: HR 87  /59  RR 18  O2 sats 99  Temp 97  No anesthesia care post op

## (undated) DEVICE — SOL IRR LACT RNG BG 3000ML

## (undated) DEVICE — STERILE POLYISOPRENE POWDER-FREE SURGICAL GLOVES WITH EMOLLIENT COATING: Brand: PROTEXIS

## (undated) DEVICE — SUT ETHLN 3-0 FS118IN 663H

## (undated) DEVICE — GOWN,NON-REINFORCED,SIRUS,SET IN SLV,XL: Brand: MEDLINE

## (undated) DEVICE — GLV SURG SIGNATURE ESSENTIAL PF LTX SZ8

## (undated) DEVICE — BNDG ELAS ELITE V/CLOSE 6IN 5YD LF STRL

## (undated) DEVICE — TBG PUMP ARTHSCP MAIN AR6400 16FT

## (undated) DEVICE — CANN SMPL SOFTECH BIFLO ETCO2 A/M 7FT

## (undated) DEVICE — DRSNG GZ CURAD XEROFORM NONADHS 5X9IN STRL

## (undated) DEVICE — PK KN ARTHSCP LF 60

## (undated) DEVICE — BLD SHAVER RESEC SABRE COOLCUT 5MM 13CM

## (undated) DEVICE — SINGLE-USE BIOPSY FORCEPS: Brand: RADIAL JAW 4

## (undated) DEVICE — SPNG GZ WOVN 4X4IN 12PLY 10/BX STRL

## (undated) DEVICE — GLV SURG TRIUMPH PF LTX 7 STRL